# Patient Record
Sex: FEMALE | Race: WHITE | NOT HISPANIC OR LATINO | Employment: FULL TIME | URBAN - METROPOLITAN AREA
[De-identification: names, ages, dates, MRNs, and addresses within clinical notes are randomized per-mention and may not be internally consistent; named-entity substitution may affect disease eponyms.]

---

## 2017-03-20 ENCOUNTER — OFFICE VISIT (OUTPATIENT)
Dept: FAMILY MEDICINE | Facility: CLINIC | Age: 37
End: 2017-03-20

## 2017-03-20 VITALS
HEART RATE: 61 BPM | BODY MASS INDEX: 28.39 KG/M2 | DIASTOLIC BLOOD PRESSURE: 82 MMHG | WEIGHT: 165.4 LBS | RESPIRATION RATE: 16 BRPM | SYSTOLIC BLOOD PRESSURE: 120 MMHG | OXYGEN SATURATION: 96 %

## 2017-03-20 DIAGNOSIS — M25.50 MULTIPLE JOINT PAIN: Primary | ICD-10-CM

## 2017-03-20 DIAGNOSIS — E78.2 MIXED HYPERLIPIDEMIA: ICD-10-CM

## 2017-03-20 DIAGNOSIS — L70.9 ACNE, UNSPECIFIED ACNE TYPE: ICD-10-CM

## 2017-03-20 DIAGNOSIS — R53.83 TIRED: ICD-10-CM

## 2017-03-20 ASSESSMENT — PAIN SCALES - GENERAL: PAINLEVEL: NO PAIN (0)

## 2017-03-20 NOTE — MR AVS SNAPSHOT
After Visit Summary   3/20/2017    Patricia Corral    MRN: [id number]           Patient Information     Date Of Birth          1980        Visit Information        Provider Department      3/20/2017 3:50 PM Isac Fatima MD [facility]        Today's Diagnoses     Multiple joint pain    -  1    Tired        Acne, unspecified acne type        Mixed hyperlipidemia          Care Instructions    Primary Care Center Phone Number [phone]  Primary Care Center Medication Refill Request Information:  * Please contact your pharmacy regarding ANY request for medication refills.  ** [facility] Prescription Fax = [phone]  * Please allow 3 business days for routine medication refills.  * Please allow 5 business days for controlled substance medication refills.     Primary Care Center Test Result notification information:  *You will be notified with in 7-10 days of your appointment day regarding the results of your test.  If you are on MyChart you will be notified as soon as the provider has reviewed the results and signed off on them.                Follow-ups after your visit        Follow-up notes from your care team     Write patient with results      Future tests that were ordered for you today     Open Future Orders        Priority Expected Expires Ordered    Vitamin D Deficiency Routine 3/21/2017 3/20/2018 3/20/2017    CBC with platelets differential Routine 3/21/2017 3/20/2018 3/20/2017    Lipid panel reflex to direct LDL Routine 3/21/2017 3/20/2018 3/20/2017    TSH with free T4 reflex Routine 3/21/2017 3/20/2018 3/20/2017    CRP inflammation Routine 3/21/2017 3/20/2018 3/20/2017    Testosterone Free and Total Routine 3/21/2017 3/20/2018 3/20/2017    Follicle stimulating hormone Routine 3/21/2017 3/20/2018 3/20/2017    Lutropin Routine 3/21/2017 3/20/2018 3/20/2017    Estradiol Routine 3/21/2017 3/20/2018 3/20/2017    Comprehensive metabolic panel Routine 3/21/2017 3/20/2018  3/20/2017            Who to contact     Please call your clinic at [phone] to:    Ask questions about your health    Make or cancel appointments    Discuss your medicines    Learn about your test results    Speak to your doctor   If you have compliments or concerns about an experience at your clinic, or if you wish to file a complaint, please contact [facility] Patient Relations at [phone] or email us at [email or web address]         Additional Information About Your Visit        [id number] Information     Cardley is an electronic gateway that provides easy, online access to your medical records. With Cardley, you can request a clinic appointment, read your test results, renew a prescription or communicate with your care team.     To sign up for Cardley visit the website at [email or web address]   You will be asked to enter the access code listed below, as well as some personal information. Please follow the directions to create your username and password.     Your access code is: [email or web address]  Expires: 2017  7:30 AM     Your access code will  in 90 days. If you need help or a new code, please contact your [facility] or call [phone] for assistance.        Care EveryWhere ID     This is your Care EveryWhere ID. This could be used by other organizations to access your [place] medical records  [id number]        Your Vitals Were     Pulse Respirations Pulse Oximetry Breastfeeding? BMI (Body Mass Index)       61 16 96% No 28.39 kg/m2        Blood Pressure from Last 3 Encounters:   17 120/82   11/16/15 114/68   10/01/14 121/76    Weight from Last 3 Encounters:   17 75 kg (165 lb 6.4 oz)   11/16/15 72.8 kg (160 lb 8 oz)   10/01/14 72.1 kg (159 lb)               Primary Care Provider Office Phone # Fax #    Isac Fatima [phone]        [facility] [address] [place] [address] [phone]  [facility]  [id number]        Thank you!     Thank you for choosing [facility]  for your care. Our goal is always to provide you with excellent care. Hearing back from our patients is one way we can continue to improve our services. Please take a few minutes to complete the written survey that you may receive in the mail after your visit with us. Thank you!             Your Updated Medication List - Protect others around you: Learn how to safely use, store and throw away your medicines at www.disposemymeds.org.          This list is accurate as of: 3/20/17  5:02 PM.  Always use your most recent med list.                   Brand Name Dispense Instructions for use    clindamycin 1 % lotion    CLEOCIN T     Apply topically 2 times daily       fluticasone 50 MCG/ACT spray    FLONASE    1 Package    Spray 2 sprays into both nostrils daily.       ketotifen 0.025 % Soln ophthalmic solution    ZADITOR/REFRESH ANTI-ITCH    1 Bottle    Place 1 drop into both eyes 2 times daily       loratadine 10 MG tablet    CLARITIN    90 tablet    Take 1 tablet by mouth daily.       SPIRONOLACTONE PO      Take 25 mg by mouth       TRETIN-X 0.05 % CREAM Kit   Generic drug:  Tretinoin-Cleanser-Moisturizer          vitamin D 400 UNITS capsule      Take 1 capsule by mouth daily.

## 2017-03-20 NOTE — NURSING NOTE
Chief Complaint   Patient presents with     Knee Pain     pt is here to discuss joint pain x 6 months        Dedra Lama CMA at 4:04 PM on 3/20/2017

## 2017-03-20 NOTE — PROGRESS NOTES
SUBJECTIVE:  Patricia Corral is a 36-year-old female who presents for primary care assessment of joint pains and questions about weight.  I last saw Patricia in 2009 and she indicates she has been quite busy as her son Diogo born [birth year] 2011 was diagnosed at age 2-1/2 of having leukemia.  He is currently 6 years old recently finished treatment for leukemia.   She now feels she has time to  take care of her own personal health.  She saw dermatology for facial acne.  They recommended starting spironolactone.  She is worried about spironolactone, wondering if she should have some baseline labs prior to starting the spironolactone.  Additionally she is concerned she gained 15 pounds without trying and does not like oral contraceptives because they seem to make her gain weight and therefore she did not want to use oral contraceptives for acne treatment.    She indicates she is having several joint pains in her hands, shoulders and other joints of her lower extremities.  She tried repositioning herself at sleep, but still has joint pain.  She tries to exercise, but also works full-time and with her role as a mother and her son's medical concerns may not have been as active. She  is interested in baseline labs including lipids, also would like to have hormonal labs performed.  Her periods are regular at this time.  She was slightly irregular during the stressful period of time during her son's therapy, but periods have been regular for several months.  She denies any other significant problems at this time.  Denies depression.       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  She will be  in September.    REVIEW OF SYSTEMS:  Occasionally she will notice that her left knee hurts.  She felt as though her left knee went out once going up and down the stairs, but does not recall a significant injury. Joint pain and Fatigue.  Pain  in shoulders with trouble driving for about one year. She is wondering if this is related to sleep but still has pain. Left knee stopped when going down stairs off and on will feel like a popping.  Gained weight 15 pounds. Menses are regular currently. No depression.  She denies exposure to ticks no potential Lyme disease.    Patient Active Problem List   Diagnosis     Allergic rhinitis     Adjustment disorder with mixed anxiety and depressed mood     Subjective visual disturbance     Tear film insufficiency     MGD (meibomian gland disease)     Past Medical History   Diagnosis Date     Asthma      Migraine headache      Seasonal allergies      Past Surgical History   Procedure Laterality Date     Dental surgery       [place] teeth      Family History   Problem Relation Age of Onset     HEART DISEASE Maternal Grandfather      MI     Alcohol/Drug Paternal Grandfather      alcoholism     Hypertension Mother      Depression Sister      Dementia Paternal Grandmother      Glaucoma No family hx of      Macular Degeneration No family hx of      Social History     Social History     Marital status: Single     Spouse name: N/A     Number of children: 1     Years of education: N/A     Occupational History     pharmacy [employer]     Social History Main Topics     Smoking status: Former Smoker     Years: 5.00     Quit date: 1/1/2006     Smokeless tobacco: Never Used     Alcohol use Yes      Comment: occ     Drug use: No     Sexual activity: Yes     Partners: Male      Comment: on Nuva ring     Other Topics Concern     Not on file     Social History Narrative    Caffeine intake/servings daily - 1    Calcium intake/servings daily - 3    Exercise 2 times weekly - describe weights    Sunscreen used - Yes    Seatbelts used - Yes    Guns stored in the home - No    Self Breast Exam - Yes    Pap test up to date -  Yes - follows at an [facility]     Eye exam up to date  -  Yes    Dental exam up to date -  Yes    DEXA scan up to date -  Not Applicable    Flex Sig/Colonoscopy up to date -  Not Applicable    Mammography up to date -  Not Applicable    Immunizations reviewed and up to date - Yes    Abuse: Current or Past (Physical, Sexual or Emotional) - No    Do you feel safe in your environment - Yes    Do you cope well with stress - Yes    Do you suffer from insomnia - No    Last updated by: Coleen Azul  4/6/2005       Problem list, PMH, Surgical HX, FH, SH, allergies, medications,immunizations reviewed and updated in Epic. 5 point ROS negative other than noted in HPI and ROS.    OBJECTIVE: /82  Pulse 61  Resp 16  Wt 75 kg (165 lb 6.4 oz)  SpO2 96%  Breastfeeding? No  BMI 28.39 kg/m2  GENERAL:  Examination reveals a pleasant female who is in no acute distress.   HEENT: Moist mucous membranes. Mild facial acne in the area of the chin region.  She does not have hirsutism.    NECK: Thyroid non enlarges no mass, no cervical adenopathy    LUNGS:  Clear.   HEART:  S1, S2, with regular rate and rhythm.   ABDOMEN:  There is no apparent hepatosplenomegaly, no apparent masses, nontender.   PSYCHIATRIC:  Mood is stable, interactive.   MUSCULOSKELETAL:  Joints are without swelling or redness.  No joint abnormality noted .She has good range of motion of all joints.  She does not have hypermobility to her joints.    Bilateral knee exam mild crepitance to patella no pain. Ligaments intact. Martell negative.    ASSESSMENT AND PLAN:  Patricia Corral is here to follow up with primary care for concerns related to joint pains.  She indicates that she does eat a good amount of calcium and vitamin D through her nutrition.    She had recent weight gain of about 15 pounds, is interested in an assessment through hormones and labs including lipids and thyroid function.  I have recommended 30 minutes of activity most days of the week and with gentle stretching and range of motion.  Further plan pending  results.   Facial Acne: We reviewed spironolactone, the side effects ( SE) of this medication and the mechanism of action.   I also discussed SE of gynecomastia that she would need to be on an effective method of birth control.  Currently, she is using condoms.  She will weigh pros and cons to determine if she is starting spironolactone per Dermatology suggestion.        Patricia was seen today for knee pain. It is not severe just notes cracking.    Diagnoses and all orders for this visit:    Multiple joint pain  -     CRP inflammation; Future  -     Vitamin D Deficiency; Future    Tired  -     CBC with platelets differential; Future  -     TSH with free T4 reflex; Future  -     Comprehensive metabolic panel; Future    Acne, unspecified acne type  -     Testosterone Free and Total; Future  -     Follicle stimulating hormone; Future  -     Lutropin; Future  -     Estradiol; Future    Mixed hyperlipidemia  -     Lipid panel reflex to direct LDL; Future      All questions were addressed.  She voiced understanding and agreement with the above.       Isac Fatima MD

## 2017-03-24 DIAGNOSIS — E78.2 MIXED HYPERLIPIDEMIA: ICD-10-CM

## 2017-03-24 DIAGNOSIS — M25.50 MULTIPLE JOINT PAIN: ICD-10-CM

## 2017-03-24 DIAGNOSIS — R53.83 TIRED: ICD-10-CM

## 2017-03-24 DIAGNOSIS — L70.9 ACNE, UNSPECIFIED ACNE TYPE: ICD-10-CM

## 2017-03-24 LAB
ALBUMIN SERPL-MCNC: 4.1 G/DL (ref 3.4–5)
ALP SERPL-CCNC: 66 U/L (ref 40–150)
ALT SERPL W P-5'-P-CCNC: 37 U/L (ref 0–50)
ANION GAP SERPL CALCULATED.3IONS-SCNC: 8 MMOL/L (ref 3–14)
AST SERPL W P-5'-P-CCNC: 27 U/L (ref 0–45)
BASOPHILS # BLD AUTO: 0.1 10E9/L (ref 0–0.2)
BASOPHILS NFR BLD AUTO: 1.3 %
BILIRUB SERPL-MCNC: 0.6 MG/DL (ref 0.2–1.3)
BUN SERPL-MCNC: 8 MG/DL (ref 7–30)
CALCIUM SERPL-MCNC: 8.6 MG/DL (ref 8.5–10.1)
CHLORIDE SERPL-SCNC: 102 MMOL/L (ref 94–109)
CHOLEST SERPL-MCNC: 142 MG/DL
CO2 SERPL-SCNC: 29 MMOL/L (ref 20–32)
CREAT SERPL-MCNC: 0.64 MG/DL (ref 0.52–1.04)
CRP SERPL-MCNC: <2.9 MG/L (ref 0–8)
DEPRECATED CALCIDIOL+CALCIFEROL SERPL-MC: 13 UG/L (ref 20–75)
DIFFERENTIAL METHOD BLD: NORMAL
EOSINOPHIL # BLD AUTO: 0.4 10E9/L (ref 0–0.7)
EOSINOPHIL NFR BLD AUTO: 6.6 %
ERYTHROCYTE [DISTWIDTH] IN BLOOD BY AUTOMATED COUNT: 11.6 % (ref 10–15)
ESTRADIOL SERPL-MCNC: 87 PG/ML
FSH SERPL-ACNC: 9.2 IU/L
GFR SERPL CREATININE-BSD FRML MDRD: NORMAL ML/MIN/1.7M2
GLUCOSE SERPL-MCNC: 90 MG/DL (ref 70–99)
HCT VFR BLD AUTO: 43.2 % (ref 35–47)
HDLC SERPL-MCNC: 48 MG/DL
HGB BLD-MCNC: 14.7 G/DL (ref 11.7–15.7)
IMM GRANULOCYTES # BLD: 0 10E9/L (ref 0–0.4)
IMM GRANULOCYTES NFR BLD: 0.3 %
LDLC SERPL CALC-MCNC: 80 MG/DL
LH SERPL-ACNC: 17.9 IU/L
LYMPHOCYTES # BLD AUTO: 2 10E9/L (ref 0.8–5.3)
LYMPHOCYTES NFR BLD AUTO: 30.8 %
MCH RBC QN AUTO: 30.4 PG (ref 26.5–33)
MCHC RBC AUTO-ENTMCNC: 34 G/DL (ref 31.5–36.5)
MCV RBC AUTO: 89 FL (ref 78–100)
MONOCYTES # BLD AUTO: 0.6 10E9/L (ref 0–1.3)
MONOCYTES NFR BLD AUTO: 8.6 %
NEUTROPHILS # BLD AUTO: 3.4 10E9/L (ref 1.6–8.3)
NEUTROPHILS NFR BLD AUTO: 52.4 %
NONHDLC SERPL-MCNC: 94 MG/DL
NRBC # BLD AUTO: 0 10*3/UL
NRBC BLD AUTO-RTO: 0 /100
PLATELET # BLD AUTO: 374 10E9/L (ref 150–450)
POTASSIUM SERPL-SCNC: 3.8 MMOL/L (ref 3.4–5.3)
PROT SERPL-MCNC: 7.9 G/DL (ref 6.8–8.8)
RBC # BLD AUTO: 4.83 10E12/L (ref 3.8–5.2)
SODIUM SERPL-SCNC: 139 MMOL/L (ref 133–144)
TRIGL SERPL-MCNC: 69 MG/DL
TSH SERPL DL<=0.005 MIU/L-ACNC: 0.74 MU/L (ref 0.4–4)
WBC # BLD AUTO: 6.4 10E9/L (ref 4–11)

## 2017-03-25 LAB
SHBG SERPL-SCNC: 27 NMOL/L (ref 30–135)
TESTOST FREE SERPL-MCNC: 0.28 NG/DL (ref 0.13–0.92)
TESTOST SERPL-MCNC: 14 NG/DL (ref 8–60)

## 2017-06-03 ENCOUNTER — HEALTH MAINTENANCE LETTER (OUTPATIENT)
Age: 37
End: 2017-06-03

## 2018-01-17 ENCOUNTER — OFFICE VISIT (OUTPATIENT)
Dept: OBGYN | Facility: CLINIC | Age: 38
End: 2018-01-17
Attending: ADVANCED PRACTICE MIDWIFE
Payer: COMMERCIAL

## 2018-01-17 VITALS
SYSTOLIC BLOOD PRESSURE: 120 MMHG | BODY MASS INDEX: 29.2 KG/M2 | HEART RATE: 75 BPM | WEIGHT: 170.1 LBS | DIASTOLIC BLOOD PRESSURE: 73 MMHG

## 2018-01-17 DIAGNOSIS — O09.529 HIGH-RISK PREGNANCY, ELDERLY MULTIGRAVIDA, UNSPECIFIED TRIMESTER: Primary | ICD-10-CM

## 2018-01-17 DIAGNOSIS — F43.23 ADJUSTMENT DISORDER WITH MIXED ANXIETY AND DEPRESSED MOOD: ICD-10-CM

## 2018-01-17 DIAGNOSIS — Z36.89 ENCOUNTER FOR FETAL ANATOMIC SURVEY: ICD-10-CM

## 2018-01-17 DIAGNOSIS — Z34.81 ENCOUNTER FOR SUPERVISION OF OTHER NORMAL PREGNANCY IN FIRST TRIMESTER: Primary | ICD-10-CM

## 2018-01-17 LAB
ABO + RH BLD: NORMAL
ABO + RH BLD: NORMAL
BASOPHILS # BLD AUTO: 0 10E9/L (ref 0–0.2)
BASOPHILS NFR BLD AUTO: 0.3 %
BLD GP AB SCN SERPL QL: NORMAL
BLOOD BANK CMNT PATIENT-IMP: NORMAL
DIFFERENTIAL METHOD BLD: NORMAL
EOSINOPHIL # BLD AUTO: 0.6 10E9/L (ref 0–0.7)
EOSINOPHIL NFR BLD AUTO: 5.8 %
ERYTHROCYTE [DISTWIDTH] IN BLOOD BY AUTOMATED COUNT: 12.1 % (ref 10–15)
HCT VFR BLD AUTO: 41.2 % (ref 35–47)
HGB BLD-MCNC: 13.9 G/DL (ref 11.7–15.7)
IMM GRANULOCYTES # BLD: 0 10E9/L (ref 0–0.4)
IMM GRANULOCYTES NFR BLD: 0.3 %
LYMPHOCYTES # BLD AUTO: 1.7 10E9/L (ref 0.8–5.3)
LYMPHOCYTES NFR BLD AUTO: 16.6 %
MCH RBC QN AUTO: 30.8 PG (ref 26.5–33)
MCHC RBC AUTO-ENTMCNC: 33.7 G/DL (ref 31.5–36.5)
MCV RBC AUTO: 91 FL (ref 78–100)
MONOCYTES # BLD AUTO: 0.6 10E9/L (ref 0–1.3)
MONOCYTES NFR BLD AUTO: 6.3 %
NEUTROPHILS # BLD AUTO: 7.1 10E9/L (ref 1.6–8.3)
NEUTROPHILS NFR BLD AUTO: 70.7 %
NRBC # BLD AUTO: 0 10*3/UL
NRBC BLD AUTO-RTO: 0 /100
PLATELET # BLD AUTO: 330 10E9/L (ref 150–450)
RBC # BLD AUTO: 4.52 10E12/L (ref 3.8–5.2)
SPECIMEN EXP DATE BLD: NORMAL
WBC # BLD AUTO: 10.1 10E9/L (ref 4–11)

## 2018-01-17 PROCEDURE — G0463 HOSPITAL OUTPT CLINIC VISIT: HCPCS | Mod: 25,ZF

## 2018-01-17 PROCEDURE — 76801 OB US < 14 WKS SINGLE FETUS: CPT | Mod: ZF

## 2018-01-17 PROCEDURE — 86900 BLOOD TYPING SEROLOGIC ABO: CPT | Performed by: ADVANCED PRACTICE MIDWIFE

## 2018-01-17 PROCEDURE — 86901 BLOOD TYPING SEROLOGIC RH(D): CPT | Performed by: ADVANCED PRACTICE MIDWIFE

## 2018-01-17 PROCEDURE — 86850 RBC ANTIBODY SCREEN: CPT | Performed by: ADVANCED PRACTICE MIDWIFE

## 2018-01-17 PROCEDURE — 36415 COLL VENOUS BLD VENIPUNCTURE: CPT | Performed by: ADVANCED PRACTICE MIDWIFE

## 2018-01-17 PROCEDURE — 87389 HIV-1 AG W/HIV-1&-2 AB AG IA: CPT | Performed by: ADVANCED PRACTICE MIDWIFE

## 2018-01-17 PROCEDURE — 87340 HEPATITIS B SURFACE AG IA: CPT | Performed by: ADVANCED PRACTICE MIDWIFE

## 2018-01-17 PROCEDURE — 82306 VITAMIN D 25 HYDROXY: CPT | Performed by: ADVANCED PRACTICE MIDWIFE

## 2018-01-17 PROCEDURE — 86762 RUBELLA ANTIBODY: CPT | Performed by: ADVANCED PRACTICE MIDWIFE

## 2018-01-17 PROCEDURE — 86706 HEP B SURFACE ANTIBODY: CPT | Performed by: ADVANCED PRACTICE MIDWIFE

## 2018-01-17 PROCEDURE — 87086 URINE CULTURE/COLONY COUNT: CPT | Performed by: ADVANCED PRACTICE MIDWIFE

## 2018-01-17 PROCEDURE — 85025 COMPLETE CBC W/AUTO DIFF WBC: CPT | Performed by: ADVANCED PRACTICE MIDWIFE

## 2018-01-17 PROCEDURE — 86780 TREPONEMA PALLIDUM: CPT | Performed by: ADVANCED PRACTICE MIDWIFE

## 2018-01-17 RX ORDER — PRENATAL VIT/IRON FUM/FOLIC AC 27MG-0.8MG
1 TABLET ORAL DAILY
COMMUNITY
End: 2019-04-09

## 2018-01-17 NOTE — PROGRESS NOTES
"  Subjective   37 year old female who presents to clinic for initiation of OB care.   at 9w1d with estimated date of delivery of Aug 21, 2018 based on LMP, ultrasound confirms.  Pregnancy is planned.    Symptoms since LMP include mild nausea and fatigue.  Patient has tried these relief measures: diet modification, small frequent meals, increased rest and increased fluids.      PERSONAL/SOCIAL HISTORY  Lives with  and son.  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   to , \"[place].\"  in 2017.   He is the FOB to this child; not birth father of 1st child.    Additional items: Denies past or present domestic violence, sexual and psychological abuse.    OTHER:    - Son, \"Diogo,\" born in . Diagnosed with acute lymphoblastic leukemia at 2.5 years old. Now 8 years old and had last cancer treatment 1 year   ago.   - Hx of adjustment disorder- anxiety and depression- during this time. Reports she saw a counselor to cope with stress of her sick child. Denies  concerns now; is not on medication, not continuing with counseling.    Objective  -VS: reviewed and within normal limits   -General appearance: no acute distress, patient is comfortable   NEUROLOGICAL/PSYCHIATRIC   - Oriented x3,   -Mood and affect: : normal   Genetic/Infection questionnaire completed, risks include: none    Pt reports last pap was last year at [facility] in [place].    Assessment/Plan   at 9w1d  by Patient's last menstrual period was 2017. and US confirms  Encounter Diagnosis   Name Primary?     High-risk pregnancy, elderly multigravida, first trimester Yes     Orders Placed This Encounter   Procedures     25- OH-Vitamin D     CBC with Platelets Differential [GQF126]     Anti Treponema [PIW8661]     Rubella Antibody IgG Quantitative [YJU4143]     Hepatitis B Surface Antigen [QIH291]     Hepatitis B Surface Antibody     HIV " Antigen Antibody Combo [MZM3579]     [facility] Referral [9046.022]     ABO/Rh Type and Screen [CKT309]     Orders Placed This Encounter   Medications     Prenatal Vit-Fe Fumarate-FA (PRENATAL MULTIVITAMIN PLUS IRON) 27-0.8 MG TABS per tablet     Sig: Take 1 tablet by mouth daily     - Oriented to Practice, types of care, and how to reach a provider. Undecided CNM or MD.   - Patient received 1st trimester new OB education packet complete with aide of The Expectant Family booklet including information on genetic screening test options.  - Patient desires level II ultrasound which was ordered.  - Patient was encouraged to start prenatal vitamins as tolerated.    - Patient instructed to schedule new OB exam with provider at 10 weeks.    - Pregnancy concerns to be addressed by provider at new OB exam include: Needs GC/CT at [facility], record release for pap done last year, cnsanna or md care.    OBI education reviewed.  OBI labs ordered.  Needs record release completed at [facility] for pap. Up to date- done last year at [facility].  Needs GC/CT at [facility].  Desires level 2 ultrasound, M referral placed.   Undecided on 1st trimester screening, discuss again at B.  Undecided KARO or MD care.    Pt to RTO for NOB visit in 2 weeks and prn if questions or concerns    ESTEE Heller CNM

## 2018-01-17 NOTE — MR AVS SNAPSHOT
"              After Visit Summary   1/17/2018    Patricia Levi    MRN: [id number]           Patient Information     Date Of Birth          1980        Visit Information        Provider Department      1/17/2018 2:30 PM Boogie Payton CNM [facility]        Today's Diagnoses     High-risk pregnancy, elderly multigravida, first trimester    -  1    Encounter for fetal anatomic survey        Adjustment disorder with mixed anxiety and depressed mood          Care Instructions      Thank you for choosing [facility] for your [facility]etrical care.  We are an integrated health clinic with obstetricians, midwives, a psychologist, an acupuncturist, a nutritionist, a pharmacist, internal medicine and family practice all in one.  If you have questions about services offered please ask.      o Please keep all appointments with your provider.  You will help catch, prevent and treat problems early.  o Review your Expectant Family booklet and folder given to you at this intake visit.  It can answer many basic questions including:    Treatment for nausea and vomiting    Medications that are safe in pregnancy    Healthy diet and weight gain    Exercise and activity  o ASK questions!!  Please use \"Questions for my New OB visit\" form to write down any questions or concerns for your next visit.  o Eat a healthy diet.  Visit www.choosemyplate.gov and click on  Pregnancy and Breastfeeding  for information and tips  o Do not smoke.  Avoid other people's smoke, too.  We are happy to help with referrals to stop smoking programs.  o Do not drink alcohol.  o Try to avoid people who have colds or other infections.  Practice good hand washing.  o Consider registering for our Healthy Pregnancy Class here at [facility].  This class is offered every 3rd Wednesday from 2:30-4:30 p.m.  [place] at [phone] or online at [email or web address] or " [email or web address]  o Consider registering for prenatal education classes through [place] at [facility].  You can view class schedules and register online at [email or web address] or call [phone] for questions     For urgent concerns, call [facility] at [phone] to speak with a triage nurse during regular clinic hours (8:00 am - 4:30 pm).  This line is answered by our service 24 hours a day, after hours a provider will return your call.  Thank you for choosing [facility] for your obstetrical care.  We are an integrated health clinic with obstetricians, midwives, a psychologist, an acupuncturist, a nutritionist, a pharmacist, internal medicine and family practice all in one.  If you have questions about services offered please ask.      o Please keep all appointments with your provider.  You will help catch, prevent and treat problems early.  o Review your Expectant Family booklet and folder given to you at this intake visit.  It can answer many basic questions including:    Treatment for nausea and vomiting    Medications that are safe in pregnancy    Healthy diet and weight gain    Exercise and activity  o ASK questions!!  Please use \"Questions for my New OB visit\" form to write down any questions or concerns for your next visit.  o Eat a healthy diet.  Visit www.choosemyplate.gov and click on  Pregnancy and Breastfeeding  for information and tips  o Do not smoke.  Avoid other people's smoke, too.  We are happy to help with referrals to stop smoking programs.  o Do not drink alcohol.  o Try to avoid people who have colds or other infections.  Practice good hand washing.  o Consider registering for our Healthy Pregnancy Class here at [facility].  This class is offered every 3rd Wednesday from 2:30-4:30 p.m.  [place] at [phone] or online at [email or web address] or [email or web address]  o Consider registering for prenatal " education classes through [place] at [facility].  You can view class schedules and register online at [email or web address] or call [phone] for questions     For urgent concerns, call [facility] at [phone] to speak with a triage nurse during regular clinic hours (8:00 am - 4:30 pm).  This line is answered by our service 24 hours a day, after hours a provider will return your call.          Follow-ups after your visit        Additional Services     Maternal Fetal Medicine Center Referral [9046.022]       >> Patient may proceed with recommendations for further testing as directed by the Maternal Fetal Medicine Specialist >>    >> If requesting Fetal Echo: M will determine appropriate location for exam due to indication.    >> If requesting Lung Maturity Amnio:  If results indicate fetal lung maturity, induction or C/S is recommended within 36 hours.  Please schedule accordingly.     Dear Patient:   Please be aware that coverage of these services is subject to the terms and limitations of your health insurance plan.  Call member services at your health plan with any benefit or coverage questions.      Please bring the following to your appointment:    >>  Any x-rays, CTs or MRIs which have been performed.  Contact the facility where they were done to arrange for  prior to your scheduled appointment.  Any new CT, MRI or other procedures ordered by your specialist must be performed at a [place] facility or coordinated by your clinic's referral office.  >>  List of current medications   >>  This referral request   >>  Any documents/labs given to you for this referral                  Follow-up notes from your care team     Return in 2 weeks (on 1/31/2018) for NOB.      Future tests that were ordered for you today     Open Future Orders        Priority Expected Expires Ordered    [facility] Genetic Counseling Routine  1/17/2019 1/17/2018    [facility] US Comprehensive Single Routine  11/17/2018  1/17/2018            Who to contact     Please call your clinic at [phone] to:    Ask questions about your health    Make or cancel appointments    Discuss your medicines    Learn about your test results    Speak to your doctor   If you have compliments or concerns about an experience at your clinic, or if you wish to file a complaint, please contact [facility] Patient Relations at [phone] or email us at [email or web address] [email or web address]         Additional Information About Your Visit        Jpwholesalehart Information     [employer]im[employer] gives you secure access to your electronic health record. If you see a primary care provider, you can also send messages to your care team and make appointments. If you have questions, please call your primary care clinic.  If you do not have a primary care provider, please call [phone] and they will assist you.      [employer]im[employer] devices is an electronic gateway that provides easy, online access to your medical records. With [employer], you can request a clinic appointment, read your test results, renew a prescription or communicate with your care team.     To access your existing account, please contact your [facility] or call [phone] for assistance.        Care EveryWhere ID     This is your Care EveryWhere ID. This could be used by other organizations to access your [place] medical records  [id number]        Your Vitals Were     Pulse Last Period BMI (Body Mass Index)             75 11/14/2017 29.2 kg/m2          Blood Pressure from Last 3 Encounters:   01/17/18 120/73   03/20/17 120/82   11/16/15 114/68    Weight from Last 3 Encounters:   01/17/18 77.2 kg (170 lb 1.6 oz)   03/20/17 75 kg (165 lb 6.4 oz)   11/16/15 72.8 kg (160 lb 8 oz)              We Performed the Following     25- [place]-Vitamin D     ABO/Rh Type and Screen [UJO367]     Anti Treponema [YEW9824]     CBC with Platelets Differential [OEX470]     Hepatitis B Surface  Antibody     Hepatitis B Surface Antigen [SCF231]     HIV Antigen Antibody Combo [YRI2345]     Maternal Fetal Medicine Center Referral [9046.022]     Rubella Antibody IgG Quantitative [XZT4051]     Urine Culture Aerobic Bacterial [HEU071]        Primary Care Provider Office Phone # Fax #    Isac Fatima [phone]       [address] [facility] [address]        Equal Access to Services     HECTOR BOWENS : Hadii aad ku hadasho Soomaali, waaxda luqadaha, qaybta kaalmada adeegyada, waxay idiin hayaan adeeg khisabellash la'kayn ah. So [facility] [phone].    ATENCIÓN: Si juarez heart, tiene a lin disposición servicios gratuitos de asistencia lingüística. Lucyame al [phone].    We comply with applicable federal civil rights laws and [place] laws. We do not discriminate on the basis of race, color, national origin, age, disability, sex, sexual orientation, or gender identity.            Thank you!     Thank you for choosing [facility]  for your care. Our goal is always to provide you with excellent care. Hearing back from our patients is one way we can continue to improve our services. Please take a few minutes to complete the written survey that you may receive in the mail after your visit with us. Thank you!             Your Updated Medication List - Protect others around you: Learn how to safely use, store and throw away your medicines at www.disposemymeds.org.          This list is accurate as of: 1/17/18  3:57 PM.  Always use your most recent med list.                   Brand Name Dispense Instructions for use Diagnosis    clindamycin 1 % lotion    CLEOCIN T     Apply topically 2 times daily        fluticasone 50 MCG/ACT spray    FLONASE    1 Package    Spray 2 sprays into both nostrils daily.    Allergic rhinitis, cause unspecified       ketotifen 0.025 % Soln ophthalmic solution    ZADITOR/REFRESH ANTI-ITCH    1 Bottle    Place 1 drop into both eyes 2 times daily     Chronic allergic conjunctivitis       loratadine 10 MG tablet    CLARITIN    90 tablet    Take 1 tablet by mouth daily.    Allergic rhinitis, cause unspecified       prenatal multivitamin plus iron 27-0.8 MG Tabs per tablet      Take 1 tablet by mouth daily        SPIRONOLACTONE PO      Take 25 mg by mouth        TRETIN-X 0.05 % CREAM Kit   Generic drug:  Tretinoin-Cleanser-Moisturizer           vitamin D 400 UNITS capsule      Take 1 capsule by mouth daily.

## 2018-01-17 NOTE — PROGRESS NOTES
37 year old female, , with LMP 2017, 9 1/7 weeks. Estimated Date of Delivery: 2018,  presents for confirmation of dates and assessment of viability. This study was done transabdominally.    Measurements     CRL = 27.0 mm = 9 4/7 weeks  EGA.       Fetal anatomy appears normal for gestational age.     Fetal/Fetal Cardiac Activity: Present.  FHR = 158bpm.     Implantation: Intrauterine.     Cervix = 3.4 cm      Maternal structures appear normal.    Impression: Viable howe intrauterine pregnancy at 9+4.    Recommend comprehensive scan at 18 to 20 weeks.    JAMAL Baires MD

## 2018-01-17 NOTE — MR AVS SNAPSHOT
After Visit Summary   1/17/2018    Patricia Levi    MRN: [id number]           Patient Information     Date Of Birth          1980        Visit Information        Provider Department      1/17/2018 2:00 PM [facility] ULTRASOUND [facility]        Today's Diagnoses     Encounter for supervision of other normal pregnancy in first trimester    -  1       Follow-ups after your visit        Who to contact     Please call your clinic at [phone] to:    Ask questions about your health    Make or cancel appointments    Discuss your medicines    Learn about your test results    Speak to your doctor   If you have compliments or concerns about an experience at your clinic, or if you wish to file a complaint, please contact [facility] Patient Relations at [phone] or email us at [email or web address]         Additional Information About Your Visit        MyChart Information     [employer] gives you secure access to your electronic health record. If you see a primary care provider, you can also send messages to your care team and make appointments. If you have questions, please call your primary care clinic.  If you do not have a primary care provider, please call [phone] and they will assist you.      [employer] is an electronic gateway that provides easy, online access to your medical records. With [employer], you can request a clinic appointment, read your test results, renew a prescription or communicate with your care team.     To access your existing account, please contact your [facility] or call [phone] for assistance.        Care EveryWhere ID     This is your Care EveryWhere ID. This could be used by other organizations to access your [place] medical records  [id number]        Your Vitals Were     Last Period                   11/14/2017            Blood Pressure from Last 3 Encounters:   01/17/18 120/73    03/20/17 120/82   11/16/15 114/68    Weight from Last 3 Encounters:   01/17/18 77.2 kg (170 lb 1.6 oz)   03/20/17 75 kg (165 lb 6.4 oz)   11/16/15 72.8 kg (160 lb 8 oz)               Primary Care Provider Office Phone # Fax #    Isac Fatmia [phone]        [facility] [address]        Equal Access to Services     HECTOR BOWENS : Hadii aad ku hadasho Soomaali, waaxda luqadaha, qaybta kaalmada adeegyada, waxay idiin hayaan adeyuliana macdonald . So [facility] [phone].    ATENCIÓN: Si habla español, tiene a lin disposición servicios gratuitos de asistencia lingüística. [facility] [phone].    We comply with applicable federal civil rights laws and [place] laws. We do not discriminate on the basis of race, color, national origin, age, disability, sex, sexual orientation, or gender identity.            Thank you!     Thank you for choosing [facility]  for your care. Our goal is always to provide you with excellent care. Hearing back from our patients is one way we can continue to improve our services. Please take a few minutes to complete the written survey that you may receive in the mail after your visit with us. Thank you!             Your Updated Medication List - Protect others around you: Learn how to safely use, store and throw away your medicines at www.disposemymeds.org.          This list is accurate as of: 1/17/18  2:32 PM.  Always use your most recent med list.                   Brand Name Dispense Instructions for use Diagnosis    clindamycin 1 % lotion    CLEOCIN T     Apply topically 2 times daily        fluticasone 50 MCG/ACT spray    FLONASE    1 Package    Spray 2 sprays into both nostrils daily.    Allergic rhinitis, cause unspecified       ketotifen 0.025 % Soln ophthalmic solution    ZADITOR/REFRESH ANTI-ITCH    1 Bottle    Place 1 drop into both eyes 2 times daily    Chronic allergic conjunctivitis       loratadine 10 MG tablet     CLARITIN    90 tablet    Take 1 tablet by mouth daily.    Allergic rhinitis, cause unspecified       prenatal multivitamin plus iron 27-0.8 MG Tabs per tablet      Take 1 tablet by mouth daily        SPIRONOLACTONE PO      Take 25 mg by mouth        TRETIN-X 0.05 % CREAM Kit   Generic drug:  Tretinoin-Cleanser-Moisturizer           vitamin D 400 UNITS capsule      Take 1 capsule by mouth daily.

## 2018-01-17 NOTE — LETTER
2018       RE: Patricia Levi  [address] [place] [address]     Dear Colleague,    Thank you for referring your patient, Patricia Levi, to the [facility] at [facility]. Please see a copy of my visit note below.    37 year old female, , with LMP 2017, 9 1/7 weeks. Estimated Date of Delivery: 2018,  presents for confirmation of dates and assessment of viability. This study was done transabdominally.    Measurements     CRL = 27.0 mm = 9 4/7 weeks  EGA.       Fetal anatomy appears normal for gestational age.     Fetal/Fetal Cardiac Activity: Present.  FHR = 158bpm.     Implantation: Intrauterine.     Cervix = 3.4 cm      Maternal structures appear normal.    Impression: Viable howe intrauterine pregnancy at 9+4.    Recommend comprehensive scan at 18 to 20 weeks.    JAMAL Baires MD

## 2018-01-18 PROBLEM — E55.9 VITAMIN D DEFICIENCY: Status: ACTIVE | Noted: 2018-01-18

## 2018-01-18 LAB
BACTERIA SPEC CULT: NORMAL
BACTERIA SPEC CULT: NORMAL
DEPRECATED CALCIDIOL+CALCIFEROL SERPL-MC: 21 UG/L (ref 20–75)
HBV SURFACE AB SERPL IA-ACNC: 175.28 M[IU]/ML
HBV SURFACE AG SERPL QL IA: NONREACTIVE
HIV 1+2 AB+HIV1 P24 AG SERPL QL IA: NONREACTIVE
Lab: NORMAL
RUBV IGG SERPL IA-ACNC: 47 IU/ML
SPECIMEN SOURCE: NORMAL
T PALLIDUM IGG+IGM SER QL: NEGATIVE

## 2018-01-31 ENCOUNTER — OFFICE VISIT (OUTPATIENT)
Dept: OBGYN | Facility: CLINIC | Age: 38
End: 2018-01-31
Attending: ADVANCED PRACTICE MIDWIFE
Payer: COMMERCIAL

## 2018-01-31 VITALS
HEART RATE: 73 BPM | HEIGHT: 64 IN | DIASTOLIC BLOOD PRESSURE: 73 MMHG | SYSTOLIC BLOOD PRESSURE: 114 MMHG | WEIGHT: 171.3 LBS | BODY MASS INDEX: 29.24 KG/M2

## 2018-01-31 DIAGNOSIS — O09.529 HIGH-RISK PREGNANCY, ELDERLY MULTIGRAVIDA, UNSPECIFIED TRIMESTER: Primary | ICD-10-CM

## 2018-01-31 PROCEDURE — 87591 N.GONORRHOEAE DNA AMP PROB: CPT | Performed by: ADVANCED PRACTICE MIDWIFE

## 2018-01-31 PROCEDURE — 87491 CHLMYD TRACH DNA AMP PROBE: CPT | Performed by: ADVANCED PRACTICE MIDWIFE

## 2018-01-31 PROCEDURE — G0463 HOSPITAL OUTPT CLINIC VISIT: HCPCS | Mod: ZF

## 2018-01-31 ASSESSMENT — ANXIETY QUESTIONNAIRES
1. FEELING NERVOUS, ANXIOUS, OR ON EDGE: NOT AT ALL
5. BEING SO RESTLESS THAT IT IS HARD TO SIT STILL: NOT AT ALL
2. NOT BEING ABLE TO STOP OR CONTROL WORRYING: NOT AT ALL
7. FEELING AFRAID AS IF SOMETHING AWFUL MIGHT HAPPEN: NOT AT ALL
GAD7 TOTAL SCORE: 0
6. BECOMING EASILY ANNOYED OR IRRITABLE: NOT AT ALL
3. WORRYING TOO MUCH ABOUT DIFFERENT THINGS: NOT AT ALL

## 2018-01-31 ASSESSMENT — PATIENT HEALTH QUESTIONNAIRE - PHQ9: 5. POOR APPETITE OR OVEREATING: NOT AT ALL

## 2018-01-31 NOTE — MR AVS SNAPSHOT
After Visit Summary   1/31/2018    Patricia Levi    MRN: [id number]           Patient Information     Date Of Birth          1980        Visit Information        Provider Department      1/31/2018 10:30 AM Berto Richardson APRN CNM [facility]        Today's Diagnoses     High-risk pregnancy, elderly multigravida, first trimester    -  1       Follow-ups after your visit        Follow-up notes from your care team     Return in about 3 weeks (around 2/21/2018) for ELPIDIO Visit.      Your next 10 appointments already scheduled     Feb 21, 2018 10:15 AM CST   RETURN OB with ESTEE Hurtado CNM   [facility] ([facility])    [place] [address]  [address]  [facility] [address] [phone]            Mar 27, 2018  8:45 AM CDT   Genetic Counseling with JOSE RAFAEL GEN COUNSELOR 2   [facility]    [address]  [facility] [address]   [phone]            Mar 27, 2018  9:30 AM CDT   (Arrive by 9:15 AM)   YUE US COMP with JOSE [id number]   [facility] [place] [facility])    [address]  [facility] [address] [phone]           Wear comfortable clothes and leave your valuables at home.            Mar 27, 2018 10:00 AM CDT   Radiology MD with JOSE RAFAEL TURNER MD   [facility]    [address]  [facility] [address]   [phone]           Please arrive at the time given for your first appointment. This visit is used internally to schedule the physician's time during your ultrasound.              Who to contact     Please call your clinic at [phone] to:    Ask questions about your health    Make or cancel appointments    Discuss your medicines    Learn about your test  "results    Speak to your doctor   If you have compliments or concerns about an experience at your clinic, or if you wish to file a complaint, please contact [facility] Patient Relations at [phone] or email us at [email or web address]         Additional Information About Your Visit        [employer] Information     [facility] gives you secure access to your electronic health record. If you see a primary care provider, you can also send messages to your care team and make appointments. If you have questions, please call your primary care clinic.  If you do not have a primary care provider, please call [phone] and they will assist you.      [employer] is an electronic gateway that provides easy, online access to your medical records. With [employer], you can request a clinic appointment, read your test results, renew a prescription or communicate with your care team.     To access your existing account, please contact your [facility] or call [phone] for assistance.        Care EveryWhere ID     This is your Care EveryWhere ID. This could be used by other organizations to access your [place] medical records  [id number]        Your Vitals Were     Pulse Height Last Period BMI (Body Mass Index)          73 1.626 m (5' 4\") 11/14/2017 29.4 kg/m2         Blood Pressure from Last 3 Encounters:   01/31/18 114/73   01/17/18 120/73   03/20/17 120/82    Weight from Last 3 Encounters:   01/31/18 77.7 kg (171 lb 4.8 oz)   01/17/18 77.2 kg (170 lb 1.6 oz)   03/20/17 75 kg (165 lb 6.4 oz)              We Performed the Following     Chlamydia PCR (Clinic Collect)     Gonorrhea PCR        Primary Care Provider Office Phone # Fax #    Isac Fatima [phone]       [address] [facility] [address]        Equal Access to Services     HECTOR BOWENS : balta Henry, gianfranco troncoso " cori dulce mariayuliana kaminigavin macdonald ah. [facility] [phone].    ATENCIÓN: Si juarez heart, tiene a lin disposición servicios gratuitos de asistencia lingüística. Jennifer monson [phone].    We comply with applicable federal civil rights laws and [place] laws. We do not discriminate on the basis of race, color, national origin, age, disability, sex, sexual orientation, or gender identity.            Thank you!     Thank you for choosing [facility]  for your care. Our goal is always to provide you with excellent care. Hearing back from our patients is one way we can continue to improve our services. Please take a few minutes to complete the written survey that you may receive in the mail after your visit with us. Thank you!             Your Updated Medication List - Protect others around you: Learn how to safely use, store and throw away your medicines at www.disposemymeds.org.          This list is accurate as of 1/31/18  1:18 PM.  Always use your most recent med list.                   Brand Name Dispense Instructions for use Diagnosis    clindamycin 1 % lotion    CLEOCIN T     Apply topically 2 times daily        fluticasone 50 MCG/ACT spray    FLONASE    1 Package    Spray 2 sprays into both nostrils daily.    Allergic rhinitis, cause unspecified       ketotifen 0.025 % Soln ophthalmic solution    ZADITOR/REFRESH ANTI-ITCH    1 Bottle    Place 1 drop into both eyes 2 times daily    Chronic allergic conjunctivitis       loratadine 10 MG tablet    CLARITIN    90 tablet    Take 1 tablet by mouth daily.    Allergic rhinitis, cause unspecified       prenatal multivitamin plus iron 27-0.8 MG Tabs per tablet      Take 1 tablet by mouth daily        SPIRONOLACTONE PO      Take 25 mg by mouth        TRETIN-X 0.05 % CREAM Kit   Generic drug:  Tretinoin-Cleanser-Moisturizer           vitamin D 400 UNITS capsule      Take 1 capsule by mouth daily.

## 2018-01-31 NOTE — PROGRESS NOTES
SUBJECTIVE:   37 year old, female, , 11w1d,  who presents to the clinic today for a new ob visit.  Feels well. Has started PNV.  Estimated Date of Delivery: Aug 21, 2018   Aug 21, 2018 is calculated from Patient's last menstrual period was 2017.  She has not had bleeding since her LMP.   She has had mild nausea. Weight loss has not occurred.   This was a planned pregnancy.   FOB is involved,  Contreras.    OTHER CONCERNS: mild nausea, tolerable. Also reports occasional sciatica.    ===========================================  ROS  PSYCHIATRIC:  Denies mood changes  PHQ9: Last PHQ-9 score on record= No Value exists for the : HP#PHQ9    Social History   Substance Use Topics     Smoking status: Former Smoker     Years: 5.     Quit date: 2006     Smokeless tobacco: Never Used     Alcohol use No      Comment: occ     History   Drug Use No     History   Smoking Status     Former Smoker     Years: 5.     Quit date: 2006   Smokeless Tobacco     Never Used       Alcohol use No   Comment: occ     Family History   Problem Relation Age of Onset     HEART DISEASE Maternal Grandfather      MI     Alcohol/Drug Paternal Grandfather      alcoholism     Hypertension Mother      Depression Sister      Dementia Paternal Grandmother      Anxiety Disorder Sister      Substance Abuse Sister      Leukemia Son      acute lymphoblastic leukemia      Glaucoma No family hx of      Macular Degeneration No family hx of      ============================================  MEDICAL HISTORY   Allergies   Allergen Reactions     Penicillins Rash     Noted when child     Current Outpatient Prescriptions:      Prenatal Vit-Fe Fumarate-FA (PRENATAL MULTIVITAMIN PLUS IRON) 27-0.8 MG TABS per tablet, Take 1 tablet by mouth daily, Disp: , Rfl:      SPIRONOLACTONE PO, Take 25 mg by mouth, Disp: , Rfl:      ketotifen (ZADITOR/REFRESH ANTI-ITCH) 0.025 % SOLN, Place 1 drop into both eyes 2 times daily (Patient not taking: Reported on  "2018), Disp: 1 Bottle, Rfl: 6     Tretinoin-Cleanser-Moisturizer (TRETIN-X) 0.05 % CREAM KIT, , Disp: , Rfl:      clindamycin (CLEOCIN T) 1 % lotion, Apply topically 2 times daily, Disp: , Rfl:      fluticasone (FLONASE) 50 MCG/ACT nasal spray, Spray 2 sprays into both nostrils daily. (Patient not taking: Reported on 2018), Disp: 1 Package, Rfl: 3     loratadine (CLARITIN) 10 MG tablet, Take 1 tablet by mouth daily. (Patient not taking: Reported on 2018), Disp: 90 tablet, Rfl: 1     Cholecalciferol (VITAMIN D) 400 UNIT capsule, Take 1 capsule by mouth daily., Disp: , Rfl:     Past Medical History:   Diagnosis Date     Adjustment disorder      Asthma      Migraine headache      Seasonal allergies      Past Surgical History:   Procedure Laterality Date     DENTAL SURGERY      [place] teeth         Obstetric History       T1      L2     SAB0   TAB0   Ectopic0   Multiple0   Live Births1       # Outcome Date GA Lbr Jorge/2nd Weight Sex Delivery Anes PTL Lv   2 Current            1 Term 11 39w0d  3.884 kg (8 lb 9 oz) M  EPI N ALE        GYN History- Denies Abnormal Pap Smears; last pap 2016 NILM                        Cervical procedures: Denies                        History of STI: Denies    I personally reviewed the past social/family/medical and surgical history on the date of service.   I reviewed lab work done at Intake visit with patient.    OBJECTIVE:   PHYSICAL EXAM:  /73 (BP Location: Left arm, Patient Position: Chair)  Pulse 73  Ht 1.626 m (5' 4\")  Wt 77.7 kg (171 lb 4.8 oz)  LMP 2017  BMI 29.4 kg/m2  BMI- Body mass index is 29.4 kg/(m^2)., GENERAL:  Pleasant pregnant female, alert, cooperative and well groomed.  SKIN:  Warm and dry, without lesions or rashes  HEAD: Symmetrical features.  MOUTH:  Buccal mucosa pink, moist without lesions.  Teeth in good repair.    NECK:  Thyroid without enlargement and nodules.  Lymph nodes not palpable.   LUNGS:  Clear to " auscultation.  BREAST:    No dominant, fixed or suspicious masses are noted.  No skin or nipple changes or axillary nodes.   Nipples everted.      HEART:  RRR without murmur.  ABDOMEN: Soft without masses , tenderness or organomegaly.  No CVA tenderness.  Uterus not palpable, appropriate for GA.  No scars noted. Fetal movement heard with doppler, unable to hear fetal heart tones .  MUSCULOSKELETAL:  Full range of motion  EXTREMITIES: No edema. No significant varicosities.   PELVIC EXAM: Deferred.  WET PREP: Not done  GC/CHLAMYDIA CULTURE OBTAINED:YES    ASSESSMENT:  Intrauterine pregnancy 11w1d size consistent with dates  Genetic Screening: Declines early screening  Encounter Diagnosis   Name Primary?     High-risk pregnancy, elderly multigravida, first trimester Yes      PLAN:  - Reviewed use of triage nurse line and contacting the on-call provider after hours for an urgent need such as fever, vagina bleeding, bladder or vaginal infection, rupture of membranes,  or term labor.    - Reviewed best evidence for: weight gain for her weight and height for pregnancy:  RECOMMENDED WEIGHT GAIN: 15-25 lbs.   healthy diet and foods to avoid; exercise and activity during pregnancy;avoiding exposure to toxoplasmosis; and maintenance of a generally healthy lifestyle.   - Discussed the harms, benefits, side effects and alternative therapies for current prescribed and OTC medications.  - Discussed comfort measures for sciatica pain. Reviewed possibility of support belt, PT, yoga, etc in the future as needed.  - All pt's questions discussed and answered.  Pt verbalized understanding of and agreement to plan of care.   - Encouraged Vit D supplementation  - Pap records requested  - Desires CN care  - Offered follow-up ultrasound as fetal heart tones not heard today. Pt declined.  - Continue scheduled prenatal care and prn if questions or concerns, RTC in 3-5 weeks

## 2018-01-31 NOTE — LETTER
2018       RE: Patricia Leiv  [address] [place] [address]     Dear Colleague,    Thank you for referring your patient, Patricia Levi, to the [facility] at [facility]. Please see a copy of my visit note below.    SUBJECTIVE:   37 year old, female, , 11w1d,  who presents to the clinic today for a new ob visit.  Feels well. Has started PNV.  Estimated Date of Delivery: Aug 21, 2018   Aug 21, 2018 is calculated from Patient's last menstrual period was 2017.  She has not had bleeding since her LMP.   She has had mild nausea. Weight loss has not occurred.   This was a planned pregnancy.   FOB is involved,  Contreras.    OTHER CONCERNS: mild nausea, tolerable. Also reports occasional sciatica.    ===========================================  ROS  PSYCHIATRIC:  Denies mood changes  PHQ9: Last PHQ-9 score on record= No Value exists for the : HP#PHQ9    Social History   Substance Use Topics     Smoking status: Former Smoker     Years: 5.00     Quit date: 2006     Smokeless tobacco: Never Used     Alcohol use No      Comment: occ     History   Drug Use No     History   Smoking Status     Former Smoker     Years: 5.00     Quit date: 2006   Smokeless Tobacco     Never Used       Alcohol use No   Comment: occ     Family History   Problem Relation Age of Onset     HEART DISEASE Maternal Grandfather      MI     Alcohol/Drug Paternal Grandfather      alcoholism     Hypertension Mother      Depression Sister      Dementia Paternal Grandmother      Anxiety Disorder Sister      Substance Abuse Sister      Leukemia Son      acute lymphoblastic leukemia      Glaucoma No family hx of      Macular Degeneration No family hx of      ============================================  MEDICAL HISTORY   Allergies   Allergen Reactions     Penicillins Rash     Noted when child     Current Outpatient Prescriptions:      Prenatal Vit-Fe Fumarate-FA  "(PRENATAL MULTIVITAMIN PLUS IRON) 27-0.8 MG TABS per tablet, Take 1 tablet by mouth daily, Disp: , Rfl:      SPIRONOLACTONE PO, Take 25 mg by mouth, Disp: , Rfl:      ketotifen (ZADITOR/REFRESH ANTI-ITCH) 0.025 % SOLN, Place 1 drop into both eyes 2 times daily (Patient not taking: Reported on 2018), Disp: 1 Bottle, Rfl: 6     Tretinoin-Cleanser-Moisturizer (TRETIN-X) 0.05 % CREAM KIT, , Disp: , Rfl:      clindamycin (CLEOCIN T) 1 % lotion, Apply topically 2 times daily, Disp: , Rfl:      fluticasone (FLONASE) 50 MCG/ACT nasal spray, Spray 2 sprays into both nostrils daily. (Patient not taking: Reported on 2018), Disp: 1 Package, Rfl: 3     loratadine (CLARITIN) 10 MG tablet, Take 1 tablet by mouth daily. (Patient not taking: Reported on 2018), Disp: 90 tablet, Rfl: 1     Cholecalciferol (VITAMIN D) 400 UNIT capsule, Take 1 capsule by mouth daily., Disp: , Rfl:     Past Medical History:   Diagnosis Date     Adjustment disorder      Asthma      Migraine headache      Seasonal allergies      Past Surgical History:   Procedure Laterality Date     DENTAL SURGERY      [place] teeth         Obstetric History       T1      L2     SAB0   TAB0   Ectopic0   Multiple0   Live Births1       # Outcome Date GA Lbr Jorge/2nd Weight Sex Delivery Anes PTL Lv   2 Current            1 Term 11 39w0d  3.884 kg (8 lb 9 oz) M  EPI N ALE        GYN History- Denies Abnormal Pap Smears; last pap 2016 NILM                        Cervical procedures: Denies                        History of STI: Denies    I personally reviewed the past social/family/medical and surgical history on the date of service.   I reviewed lab work done at Intake visit with patient.    OBJECTIVE:   PHYSICAL EXAM:  /73 (BP Location: Left arm, Patient Position: Chair)  Pulse 73  Ht 1.626 m (5' 4\")  Wt 77.7 kg (171 lb 4.8 oz)  LMP 2017  BMI 29.4 kg/m2  BMI- Body mass index is 29.4 kg/(m^2)., GENERAL:  Pleasant pregnant " female, alert, cooperative and well groomed.  SKIN:  Warm and dry, without lesions or rashes  HEAD: Symmetrical features.  MOUTH:  Buccal mucosa pink, moist without lesions.  Teeth in good repair.    NECK:  Thyroid without enlargement and nodules.  Lymph nodes not palpable.   LUNGS:  Clear to auscultation.  BREAST:    No dominant, fixed or suspicious masses are noted.  No skin or nipple changes or axillary nodes.   Nipples everted.      HEART:  RRR without murmur.  ABDOMEN: Soft without masses , tenderness or organomegaly.  No CVA tenderness.  Uterus not palpable, appropriate for GA.  No scars noted. Fetal movement heard with doppler, unable to hear fetal heart tones .  MUSCULOSKELETAL:  Full range of motion  EXTREMITIES: No edema. No significant varicosities.   PELVIC EXAM: Deferred.  WET PREP: Not done  GC/CHLAMYDIA CULTURE OBTAINED:YES    ASSESSMENT:  Intrauterine pregnancy 11w1d size consistent with dates  Genetic Screening: Declines early screening  Encounter Diagnosis   Name Primary?     High-risk pregnancy, elderly multigravida, first trimester Yes      PLAN:  - Reviewed use of triage nurse line and contacting the on-call provider after hours for an urgent need such as fever, vagina bleeding, bladder or vaginal infection, rupture of membranes,  or term labor.    - Reviewed best evidence for: weight gain for her weight and height for pregnancy:  RECOMMENDED WEIGHT GAIN: 15-25 lbs.   healthy diet and foods to avoid; exercise and activity during pregnancy;avoiding exposure to toxoplasmosis; and maintenance of a generally healthy lifestyle.   - Discussed the harms, benefits, side effects and alternative therapies for current prescribed and OTC medications.  - Discussed comfort measures for sciatica pain. Reviewed possibility of support belt, PT, yoga, etc in the future as needed.  - All pt's questions discussed and answered.  Pt verbalized understanding of and agreement to plan of care.   - Encouraged Vit  D supplementation  - Pap records requested  - Desires CNM care  - Offered follow-up ultrasound as fetal heart tones not heard today. Pt declined.  - Continue scheduled prenatal care and prn if questions or concerns, RTC in 3-5 weeks    Again, thank you for allowing me to participate in the care of your patient.      Sincerely,    ESTEE RockwellM

## 2018-02-01 LAB
C TRACH DNA SPEC QL NAA+PROBE: NEGATIVE
N GONORRHOEA DNA SPEC QL NAA+PROBE: NEGATIVE
SPECIMEN SOURCE: NORMAL
SPECIMEN SOURCE: NORMAL

## 2018-02-01 ASSESSMENT — ANXIETY QUESTIONNAIRES: GAD7 TOTAL SCORE: 0

## 2018-02-01 ASSESSMENT — PATIENT HEALTH QUESTIONNAIRE - PHQ9: SUM OF ALL RESPONSES TO PHQ QUESTIONS 1-9: 0

## 2018-02-21 ENCOUNTER — OFFICE VISIT (OUTPATIENT)
Dept: OBGYN | Facility: CLINIC | Age: 38
End: 2018-02-21
Attending: ADVANCED PRACTICE MIDWIFE
Payer: COMMERCIAL

## 2018-02-21 VITALS
HEIGHT: 64 IN | SYSTOLIC BLOOD PRESSURE: 118 MMHG | WEIGHT: 173.6 LBS | DIASTOLIC BLOOD PRESSURE: 66 MMHG | BODY MASS INDEX: 29.64 KG/M2

## 2018-02-21 DIAGNOSIS — O09.529 HIGH-RISK PREGNANCY, ELDERLY MULTIGRAVIDA, UNSPECIFIED TRIMESTER: Primary | ICD-10-CM

## 2018-02-21 PROCEDURE — G0463 HOSPITAL OUTPT CLINIC VISIT: HCPCS | Mod: ZF

## 2018-02-21 ASSESSMENT — PAIN SCALES - GENERAL: PAINLEVEL: NO PAIN (0)

## 2018-02-21 NOTE — PROGRESS NOTES
"Subjective:      37 year old  at 14w1d presents for a routine prenatal appointment.       No vaginal bleeding or leakage of fluid.  No contractions or cramping.    Started feeling fetal movement this week.       No HA, visual changes, RUQ or epigastric pain.   The patient presents with the following concerns: feeling well overall. Sciatica has worsened somewhat. Has exercises to do that she received from a PT but hard to find time to do them. Open to acupuncture, which she has used in the past for other reasons. Will see if insurance covers it and let us know if she would like a referral.   Level II US  Scheduled.   Offered QS: declines all genetic testing.      Objective:  Vitals:    18 1027   BP: 118/66   Weight: 78.7 kg (173 lb 9.6 oz)   Height: 1.626 m (5' 4.02\")     See OB flowsheet    Assessment/Plan     Encounter Diagnosis   Name Primary?     High-risk pregnancy, elderly multigravida, , WHS CNM Yes     - Reviewed total weight gain, encouraged continued healthy diet and exercise.      - Reviewed why/how to contact provider.    Patient education/orders or handouts today:  Fetal movement   Return to clinic in 4 weeks and prn if questions or concerns.     ESTEE Brewster CNM                  "

## 2018-02-21 NOTE — MR AVS SNAPSHOT
After Visit Summary   2018    Patricia Levi    MRN: [id number]           Patient Information     Date Of Birth          1980        Visit Information        Provider Department      2018 10:15 AM Jennifer Patel APRN CNM [facility]        Today's Diagnoses     High-risk pregnancy, elderly multigravida, , S CNM    -  1       Follow-ups after your visit        Follow-up notes from your care team     Return in about 4 weeks (around 3/21/2018) for Return OB.      Your next 10 appointments already scheduled     Mar 28, 2018 12:45 PM CDT   Genetic Counseling with JOSE RAFAEL GEN COUNSELOR 2   [facility] - [place] ([facility])    [address]  [facility] [address]   [phone]            Mar 28, 2018  1:30 PM CDT   (Arrive by 1:15 PM)   YUE US COMP with JOSE [id number]   [facility] Ultrasound - [place] ([facility])    [address]  [facility] [id number] [phone]           Wear comfortable clothes and leave your valuables at home.            Mar 28, 2018  2:00 PM CDT   Radiology MD with JOSE RAFAEL TURNER MD   [facility] - [facility])    [address]  [facility] [address]   [phone]           Please arrive at the time given for your first appointment. This visit is used internally to schedule the physician's time during your ultrasound.            2018 10:00 AM CDT   RETURN OB with ESTEE Santana CNM   [facility] ([facility])    [place] [facility] [address]  [address]  [facility] [id number] [phone]              Who to contact     Please call your clinic at [phone] to:    Ask questions about your health    Make or cancel appointments    Discuss your medicines    Learn about your test  "results    Speak to your doctor            Additional Information About Your Visit        [email or web address] Information     [email or web address] gives you secure access to your electronic health record. If you see a primary care provider, you can also send messages to your care team and make appointments. If you have questions, please call your primary care clinic.  If you do not have a primary care provider, please call [phone] and they will assist you.      myeasydocs is an electronic gateway that provides easy, online access to your medical records. With myeasydocs, you can request a clinic appointment, read your test results, renew a prescription or communicate with your care team.     To access your existing account, please contact your [facility] or call [phone] for assistance.        Care EveryWhere ID     This is your Care EveryWhere ID. This could be used by other organizations to access your [facility] medical records  [id number]        Your Vitals Were     Height Last Period BMI (Body Mass Index)             1.626 m (5' 4.02\") 11/14/2017 29.78 kg/m2          Blood Pressure from Last 3 Encounters:   02/21/18 118/66   01/31/18 114/73   01/17/18 120/73    Weight from Last 3 Encounters:   02/21/18 78.7 kg (173 lb 9.6 oz)   01/31/18 77.7 kg (171 lb 4.8 oz)   01/17/18 77.2 kg (170 lb 1.6 oz)              Today, you had the following     No orders found for display       Primary Care Provider Office Phone # Fax #    Isac Fatima [phone]       [address] [facility] [address]        Equal Access to Services     [facility]: Hadii aad ku hadasho Soomaali, waaxda luqadaha, qaybta kaalmada jennifer, gianfranco macdonald . So [facility] [phone].    ATENCIÓN: Si habla español, tiene a lin disposición servicios gratuitos de asistencia lingüística. Llame al [phone].    We comply with applicable federal civil rights laws and [place] laws. We do not " discriminate on the basis of race, color, national origin, age, disability, sex, sexual orientation, or gender identity.            Thank you!     Thank you for choosing [facility]  for your care. Our goal is always to provide you with excellent care. Hearing back from our patients is one way we can continue to improve our services. Please take a few minutes to complete the written survey that you may receive in the mail after your visit with us. Thank you!             Your Updated Medication List - Protect others around you: Learn how to safely use, store and throw away your medicines at www.disposemymeds.org.          This list is accurate as of 2/21/18 10:52 AM.  Always use your most recent med list.                   Brand Name Dispense Instructions for use Diagnosis    clindamycin 1 % lotion    CLEOCIN T     Apply topically 2 times daily        fluticasone 50 MCG/ACT spray    FLONASE    1 Package    Spray 2 sprays into both nostrils daily.    Allergic rhinitis, cause unspecified       ketotifen 0.025 % Soln ophthalmic solution    ZADITOR/REFRESH ANTI-ITCH    1 Bottle    Place 1 drop into both eyes 2 times daily    Chronic allergic conjunctivitis       loratadine 10 MG tablet    CLARITIN    90 tablet    Take 1 tablet by mouth daily.    Allergic rhinitis, cause unspecified       prenatal multivitamin plus iron 27-0.8 MG Tabs per tablet      Take 1 tablet by mouth daily        SPIRONOLACTONE PO      Take 25 mg by mouth        TRETIN-X 0.05 % CREAM Kit   Generic drug:  Tretinoin-Cleanser-Moisturizer           vitamin D 400 UNITS capsule      Take 1 capsule by mouth daily.

## 2018-02-21 NOTE — LETTER
"2018       RE: Patricia Levi  [address] [place] [address]     Dear Colleague,    Thank you for referring your patient, Patricia Levi, to the [facility] at [facility]. Please see a copy of my visit note below.    Subjective:      37 year old  at 14w1d presents for a routine prenatal appointment.       No vaginal bleeding or leakage of fluid.  No contractions or cramping.    Started feeling fetal movement this week.       No HA, visual changes, RUQ or epigastric pain.   The patient presents with the following concerns: feeling well overall. Sciatica has worsened somewhat. Has exercises to do that she received from a PT but hard to find time to do them. Open to acupuncture, which she has used in the past for other reasons. Will see if insurance covers it and let us know if she would like a referral.   Level II US  Scheduled.   Offered QS: declines all genetic testing.      Objective:  Vitals:    18 1027   BP: 118/66   Weight: 78.7 kg (173 lb 9.6 oz)   Height: 1.626 m (5' 4.02\")     See OB flowsheet    Assessment/Plan     Encounter Diagnosis   Name Primary?     High-risk pregnancy, elderly multigravida, , WHS CNM Yes     - Reviewed total weight gain, encouraged continued healthy diet and exercise.      - Reviewed why/how to contact provider.    Patient education/orders or handouts today:  Fetal movement   Return to clinic in 4 weeks and prn if questions or concerns.       Again, thank you for allowing me to participate in the care of your patient.      Sincerely,    ESTEE Brewster CNM      "

## 2018-02-21 NOTE — NURSING NOTE
Chief Complaint   Patient presents with     Prenatal Care   14.1 weeks  Some sciatic pain-   Has some exercises to do.   Level 2 scheduled 3-

## 2018-03-26 ENCOUNTER — PRE VISIT (OUTPATIENT)
Dept: MATERNAL FETAL MEDICINE | Facility: CLINIC | Age: 38
End: 2018-03-26

## 2018-03-28 ENCOUNTER — HOSPITAL ENCOUNTER (OUTPATIENT)
Dept: ULTRASOUND IMAGING | Facility: CLINIC | Age: 38
Discharge: HOME | End: 2018-03-28
Attending: ADVANCED PRACTICE MIDWIFE | Admitting: OBSTETRICS & GYNECOLOGY
Payer: COMMERCIAL

## 2018-03-28 ENCOUNTER — OFFICE VISIT (OUTPATIENT)
Dept: MATERNAL FETAL MEDICINE | Facility: CLINIC | Age: 38
End: 2018-03-28
Attending: ADVANCED PRACTICE MIDWIFE
Payer: COMMERCIAL

## 2018-03-28 DIAGNOSIS — O26.90 PREGNANCY RELATED CONDITION, ANTEPARTUM: ICD-10-CM

## 2018-03-28 DIAGNOSIS — O09.522 ELDERLY MULTIGRAVIDA IN SECOND TRIMESTER: Primary | ICD-10-CM

## 2018-03-28 PROCEDURE — 76811 OB US DETAILED SNGL FETUS: CPT

## 2018-03-28 NOTE — MR AVS SNAPSHOT
After Visit Summary   3/28/2018    Patricia Levi    MRN: [id number]           Patient Information     Date Of Birth          1980        Visit Information        Provider Department      3/28/2018 2:00 PM Valery Arauz MD [facility]        Today's Diagnoses     Elderly multigravida in second trimester    -  1       Follow-ups after your visit        Your next 10 appointments already scheduled     Apr 04, 2018 10:00 AM CDT   RETURN OB with ESTEE Santana CNCHASITY   [facility] ([facility])    [facility] [address]  [address]  [facility] [address] [phone]              Who to contact     If you have questions or need follow up information about today's clinic visit or your schedule please contact [facility] directly at [phone].  Normal or non-critical lab and imaging results will be communicated to you by MyChart, letter or phone within 4 business days after the clinic has received the results. If you do not hear from us within 7 days, please contact the clinic through [employer] or phone. If you have a critical or abnormal lab result, we will notify you by phone as soon as possible.  Submit refill requests through [employer] or call your pharmacy and they will forward the refill request to us. Please allow 3 business days for your refill to be completed.          Additional Information About Your Visit        MyChart Information     [employer] gives you secure access to your electronic health record. If you see a primary care provider, you can also send messages to your care team and make appointments. If you have questions, please call your primary care clinic.  If you do not have a primary care provider, please call [phone] and they will assist you.        Care EveryWhere ID     This is your Care EveryWhere ID. This could be used by other organizations to  access your [facility] medical records  [id number]        Your Vitals Were     Last Period                   11/14/2017            Blood Pressure from Last 3 Encounters:   02/21/18 118/66   01/31/18 114/73   01/17/18 120/73    Weight from Last 3 Encounters:   02/21/18 78.7 kg (173 lb 9.6 oz)   01/31/18 77.7 kg (171 lb 4.8 oz)   01/17/18 77.2 kg (170 lb 1.6 oz)              Today, you had the following     No orders found for display       Primary Care Provider Office Phone # Fax #    Isac Fatima [phone] [address] [facility] [address]        Equal Access to Services     HECTOR BOWENS : Hadii ethan rodriguezo Sodiane, waaxda luqadaha, qaybta kaalmada adeegyada, gianfranco macdonald . So [facility] [phone].    ATENCIÓN: Si habla español, tiene a lin disposición servicios gratuitos de asistencia lingüística. [facility] [phone].    We comply with applicable federal civil rights laws and [place] laws. We do not discriminate on the basis of race, color, national origin, age, disability, sex, sexual orientation, or gender identity.            Thank you!     Thank you for choosing [facility]  for your care. Our goal is always to provide you with excellent care. Hearing back from our patients is one way we can continue to improve our services. Please take a few minutes to complete the written survey that you may receive in the mail after your visit with us. Thank you!             Your Updated Medication List - Protect others around you: Learn how to safely use, store and throw away your medicines at www.disposemymeds.org.          This list is accurate as of 3/28/18  5:25 PM.  Always use your most recent med list.                   Brand Name Dispense Instructions for use Diagnosis    clindamycin 1 % lotion    CLEOCIN T     Apply topically 2 times daily        fluticasone 50 MCG/ACT spray    FLONASE    1 Package    Spray 2 sprays  into both nostrils daily.    Allergic rhinitis, cause unspecified       ketotifen 0.025 % Soln ophthalmic solution    ZADITOR/REFRESH ANTI-ITCH    1 Bottle    Place 1 drop into both eyes 2 times daily    Chronic allergic conjunctivitis       loratadine 10 MG tablet    CLARITIN    90 tablet    Take 1 tablet by mouth daily.    Allergic rhinitis, cause unspecified       prenatal multivitamin plus iron 27-0.8 MG Tabs per tablet      Take 1 tablet by mouth daily        SPIRONOLACTONE PO      Take 25 mg by mouth        TRETIN-X 0.05 % CREAM Kit   Generic drug:  Tretinoin-Cleanser-Moisturizer           vitamin D 400 UNITS capsule      Take 1 capsule by mouth daily.

## 2018-03-28 NOTE — PROGRESS NOTES
"Please see \"Imaging\" tab under \"Chart Review\" for details of today's visit.    Valery Arauz    "

## 2018-03-30 ENCOUNTER — TELEPHONE (OUTPATIENT)
Dept: OBGYN | Facility: CLINIC | Age: 38
End: 2018-03-30

## 2018-04-04 ENCOUNTER — OFFICE VISIT (OUTPATIENT)
Dept: OBGYN | Facility: CLINIC | Age: 38
End: 2018-04-04
Attending: ADVANCED PRACTICE MIDWIFE
Payer: COMMERCIAL

## 2018-04-04 VITALS
HEART RATE: 77 BPM | DIASTOLIC BLOOD PRESSURE: 73 MMHG | HEIGHT: 64 IN | WEIGHT: 182 LBS | BODY MASS INDEX: 31.07 KG/M2 | SYSTOLIC BLOOD PRESSURE: 112 MMHG

## 2018-04-04 DIAGNOSIS — O09.529 HIGH-RISK PREGNANCY, ELDERLY MULTIGRAVIDA, UNSPECIFIED TRIMESTER: Primary | ICD-10-CM

## 2018-04-04 PROCEDURE — G0463 HOSPITAL OUTPT CLINIC VISIT: HCPCS | Mod: ZF

## 2018-04-04 NOTE — LETTER
"2018       RE: Patricia Levi  [address] [place] [address]     Dear Colleague,    Thank you for referring your patient, Patricia Levi, to the [facility] at [facility]. Please see a copy of my visit note below.    Subjective:     37 year old  at 20w1d presents for a routine prenatal appointment.      Denies vaginal bleeding or leakage of fluid.  Denies contractions or cramping. + fetal movement.       No HA, visual changes, RUQ or epigastric pain.   The patient presents with the following concerns:  Feeling well overall  Level II US  Results reviewed normal scan. Having a boy!     Objective:  Vitals:    18 0958   BP: 112/73   Pulse: 77   Weight: 82.6 kg (182 lb)   Height: 1.626 m (5' 4.02\")   See OB flowsheet    Assessment/Plan:  Encounter Diagnosis   Name Primary?     High-risk pregnancy, elderly multigravida, , [facility] CNM Yes     - Reviewed total weight gain, encouraged continued healthy diet and exercise.      - Reviewed why/how to contact provider.   - Patient education/orders or handouts today: PTL signs/symptoms, Fetal movement and Plan for EOB visit w labs  - Return to clinic in 4-6 weeks and prn if questions or concerns.    Again, thank you for allowing me to participate in the care of your patient.      Sincerely,    ESTEE RockwellM      "

## 2018-04-04 NOTE — MR AVS SNAPSHOT
After Visit Summary   2018    Patricia Levi    MRN: [id number]           Patient Information     Date Of Birth          1980        Visit Information        Provider Department      2018 10:00 AM Berto Richardson APRN CNM [facility]        Today's Diagnoses     High-risk pregnancy, elderly multigravida, , [facility] CNM    -  1       Follow-ups after your visit        Follow-up notes from your care team     Return in about 6 weeks (around 2018) for EOB Visit.      Your next 10 appointments already scheduled     May 10, 2018  9:30 AM CDT   Return Obstetrics Extended with ESTEE Alexander CNM   [facility] ([facility])    [facility] [address]  [address]  [address] [facility] [address] [phone]              Who to contact     Please call your clinic at [phone] to:    Ask questions about your health    Make or cancel appointments    Discuss your medicines    Learn about your test results    Speak to your doctor            Additional Information About Your Visit        [employer] Information     [employer] gives you secure access to your electronic health record. If you see a primary care provider, you can also send messages to your care team and make appointments. If you have questions, please call your primary care clinic.  If you do not have a primary care provider, please call [phone] and they will assist you.      [employer] is an electronic gateway that provides easy, online access to your medical records. With [employer], you can request a clinic appointment, read your test results, renew a prescription or communicate with your care team.     To access your existing account, please contact your [facility] or call [phone] for assistance.        Care EveryWhere ID     This is your Care EveryWhere ID. This could be used by other organizations to access your  "[place] medical records  [id number]        Your Vitals Were     Pulse Height Last Period BMI (Body Mass Index)          77 1.626 m (5' 4.02\") 11/14/2017 31.22 kg/m2         Blood Pressure from Last 3 Encounters:   04/04/18 112/73   02/21/18 118/66   01/31/18 114/73    Weight from Last 3 Encounters:   04/04/18 82.6 kg (182 lb)   02/21/18 78.7 kg (173 lb 9.6 oz)   01/31/18 77.7 kg (171 lb 4.8 oz)              Today, you had the following     No orders found for display       Primary Care Provider Office Phone # Fax #    Isac Fatima [phone] [address] [facility] [address]        Equal Access to Services     [facility] : Hadii ethan mora Sodiane, waaxda luqadaha, qaybta kaalmada adenick, gianfranco macdonald . So [facility] [phone].    ATENCIÓN: Si habla español, tiene a lin disposición servicios gratuitos de asistencia lingüística. Jennifer al [phone].    We comply with applicable federal civil rights laws and [place] laws. We do not discriminate on the basis of race, color, national origin, age, disability, sex, sexual orientation, or gender identity.            Thank you!     Thank you for choosing [facility]  for your care. Our goal is always to provide you with excellent care. Hearing back from our patients is one way we can continue to improve our services. Please take a few minutes to complete the written survey that you may receive in the mail after your visit with us. Thank you!             Your Updated Medication List - Protect others around you: Learn how to safely use, store and throw away your medicines at www.disposemymeds.org.          This list is accurate as of 4/4/18 10:12 AM.  Always use your most recent med list.                   Brand Name Dispense Instructions for use Diagnosis    clindamycin 1 % lotion    CLEOCIN T     Apply topically 2 times daily        fluticasone 50 MCG/ACT spray    FLONASE    1 " Package    Spray 2 sprays into both nostrils daily.    Allergic rhinitis, cause unspecified       ketotifen 0.025 % Soln ophthalmic solution    ZADITOR/REFRESH ANTI-ITCH    1 Bottle    Place 1 drop into both eyes 2 times daily    Chronic allergic conjunctivitis       loratadine 10 MG tablet    CLARITIN    90 tablet    Take 1 tablet by mouth daily.    Allergic rhinitis, cause unspecified       prenatal multivitamin plus iron 27-0.8 MG Tabs per tablet      Take 1 tablet by mouth daily        SPIRONOLACTONE PO      Take 25 mg by mouth        TRETIN-X 0.05 % CREAM Kit   Generic drug:  Tretinoin-Cleanser-Moisturizer           vitamin D 400 UNITS capsule      Take 1 capsule by mouth daily.

## 2018-05-10 ENCOUNTER — OFFICE VISIT (OUTPATIENT)
Dept: OBGYN | Facility: CLINIC | Age: 38
End: 2018-05-10
Attending: MIDWIFE
Payer: COMMERCIAL

## 2018-05-10 VITALS
HEART RATE: 89 BPM | WEIGHT: 186.1 LBS | HEIGHT: 64 IN | SYSTOLIC BLOOD PRESSURE: 120 MMHG | DIASTOLIC BLOOD PRESSURE: 82 MMHG | BODY MASS INDEX: 31.77 KG/M2

## 2018-05-10 DIAGNOSIS — O09.529 HIGH-RISK PREGNANCY, ELDERLY MULTIGRAVIDA, UNSPECIFIED TRIMESTER: Primary | ICD-10-CM

## 2018-05-10 LAB
BASOPHILS # BLD AUTO: 0 10E9/L (ref 0–0.2)
BASOPHILS NFR BLD AUTO: 0.2 %
DEPRECATED CALCIDIOL+CALCIFEROL SERPL-MC: 33 UG/L (ref 20–75)
DIFFERENTIAL METHOD BLD: ABNORMAL
EOSINOPHIL # BLD AUTO: 0.4 10E9/L (ref 0–0.7)
EOSINOPHIL NFR BLD AUTO: 3 %
ERYTHROCYTE [DISTWIDTH] IN BLOOD BY AUTOMATED COUNT: 12.5 % (ref 10–15)
GLUCOSE 1H P 50 G GLC PO SERPL-MCNC: 102 MG/DL (ref 60–129)
HCT VFR BLD AUTO: 37.8 % (ref 35–47)
HGB BLD-MCNC: 12.8 G/DL (ref 11.7–15.7)
IMM GRANULOCYTES # BLD: 0.1 10E9/L (ref 0–0.4)
IMM GRANULOCYTES NFR BLD: 0.6 %
LYMPHOCYTES # BLD AUTO: 1.6 10E9/L (ref 0.8–5.3)
LYMPHOCYTES NFR BLD AUTO: 13.5 %
MCH RBC QN AUTO: 30.9 PG (ref 26.5–33)
MCHC RBC AUTO-ENTMCNC: 33.9 G/DL (ref 31.5–36.5)
MCV RBC AUTO: 91 FL (ref 78–100)
MONOCYTES # BLD AUTO: 1.1 10E9/L (ref 0–1.3)
MONOCYTES NFR BLD AUTO: 8.9 %
NEUTROPHILS # BLD AUTO: 8.9 10E9/L (ref 1.6–8.3)
NEUTROPHILS NFR BLD AUTO: 73.8 %
NRBC # BLD AUTO: 0 10*3/UL
NRBC BLD AUTO-RTO: 0 /100
PLATELET # BLD AUTO: 280 10E9/L (ref 150–450)
RBC # BLD AUTO: 4.14 10E12/L (ref 3.8–5.2)
WBC # BLD AUTO: 12.1 10E9/L (ref 4–11)

## 2018-05-10 PROCEDURE — 36415 COLL VENOUS BLD VENIPUNCTURE: CPT | Performed by: MIDWIFE

## 2018-05-10 PROCEDURE — 85025 COMPLETE CBC W/AUTO DIFF WBC: CPT | Performed by: MIDWIFE

## 2018-05-10 PROCEDURE — 86780 TREPONEMA PALLIDUM: CPT | Performed by: MIDWIFE

## 2018-05-10 PROCEDURE — 82306 VITAMIN D 25 HYDROXY: CPT | Performed by: MIDWIFE

## 2018-05-10 PROCEDURE — G0463 HOSPITAL OUTPT CLINIC VISIT: HCPCS | Mod: ZF

## 2018-05-10 PROCEDURE — 82950 GLUCOSE TEST: CPT | Performed by: MIDWIFE

## 2018-05-10 NOTE — LETTER
5/10/2018       RE: Patricia Levi  [address] [place] [address]     Dear Colleague,    Thank you for referring your patient, Patricia Levi, to the [facility] at [facility]. Please see a copy of my visit note below.     37 year old, , 25w2d,   The patient presents with the following concerns: here for EOB  This is first baby for spouse Keesha.  Excited.  Planning to take LOGAN classes had quick IOL with epidural at 38 wks Joshua DOUGLAS for baby size.    PHQ-9 SCORE 2018   Total Score 0     Education completed today includes breast feeding, [facility] hand out , contraception, counting movements, signs of pre-term labor, when to present to birthplace, post partum depression, GBS, getting enough iron and labor induction.  Birth preferences reviewed: Medicated  Labor support:     Pt mom and sister     Feeding plans : Breastfeed    Contraception planned:  [facility] will consider IUD   The following labs were ordered today:       GCT, CBC w platelets, Vitamin D and Anti-treponema  Water birth consent form was not given.  Blood type:   ABO   Date Value Ref Range Status   2018 O  Final     RH(D)   Date Value Ref Range Status   2018 Pos  Final     Antibody Screen   Date Value Ref Range Status   2018 Neg  Final   , Rhogam  was notgiven.  TDAP  was notgiven. Too early   A/P:  Encounter Diagnosis   Name Primary?     High-risk pregnancy, elderly multigravida, , [facility] CNM Yes     Orders Placed This Encounter   Procedures     Glucose 1 Hour     25- OH-Vitamin D     CBC with Platelets Differential     Treponema Abs w Reflex to RPR and Titer     Needs TDAP next visit.   Continue scheduled prenatal care      Again, thank you for allowing me to participate in the care of your patient.      Sincerely,    ESTEE Alexander CNCHASITY

## 2018-05-10 NOTE — MR AVS SNAPSHOT
After Visit Summary   5/10/2018    Patricia Levi    MRN: [id number]           Patient Information     Date Of Birth          1980        Visit Information        Provider Department      5/10/2018 9:30 AM Esperanza Wadsworth APRN CNM [facility]        Today's Diagnoses     High-risk pregnancy, elderly multigravida, , [facility] CNM    -  1       Follow-ups after your visit        Follow-up notes from your care team     Return in about 4 weeks (around 2018) for ELPIDIO.      Your next 10 appointments already scheduled     2018  3:30 PM CDT   RETURN OB with ESTEE Henry CNM   [facility] ([facility])    [facility] [address]  [address]  [address]  [facility] [address] [phone]              Who to contact     Please call your clinic at [phone] to:    Ask questions about your health    Make or cancel appointments    Discuss your medicines    Learn about your test results    Speak to your doctor            Additional Information About Your Visit        MyChart Information     [employer] gives you secure access to your electronic health record. If you see a primary care provider, you can also send messages to your care team and make appointments. If you have questions, please call your primary care clinic.  If you do not have a primary care provider, please call [phone] and they will assist you.      [employer] is an electronic gateway that provides easy, online access to your medical records. With [employer], you can request a clinic appointment, read your test results, renew a prescription or communicate with your care team.     To access your existing account, please contact your [facility] or call [phone] for assistance.        Care EveryWhere ID     This is your Care EveryWhere ID. This could be used by other organizations to access your [facility]  "records  [id number]        Your Vitals Were     Pulse Height Last Period BMI (Body Mass Index)          89 1.626 m (5' 4\") 11/14/2017 31.94 kg/m2         Blood Pressure from Last 3 Encounters:   05/10/18 120/82   04/04/18 112/73   02/21/18 118/66    Weight from Last 3 Encounters:   05/10/18 84.4 kg (186 lb 1.6 oz)   04/04/18 82.6 kg (182 lb)   02/21/18 78.7 kg (173 lb 9.6 oz)              We Performed the Following     25- OH-Vitamin D     CBC with Platelets Differential     Glucose 1 Hour     Treponema Abs w Reflex to RPR and Titer          Today's Medication Changes          These changes are accurate as of 5/10/18 11:01 AM.  If you have any questions, ask your nurse or doctor.               Stop taking these medicines if you haven't already. Please contact your care team if you have questions.     ketotifen 0.025 % Soln ophthalmic solution   Commonly known as:  ZADITOR/REFRESH ANTI-ITCH   Stopped by:  Esperanza Wadsworth APRN CNM           SPIRONOLACTONE PO   Stopped by:  Esperanza Wadsworth APRN CNM           TRETIN-X 0.05 % CREAM Kit   Generic drug:  Tretinoin-Cleanser-Moisturizer   Stopped by:  Esperanza Wadsworth APRN CNM                    Primary Care Provider Office Phone # Fax #    Isac Fatima [phone]        [facility] [address]        Equal Access to Services     [facility] AH: Hadii aad ku hadasho Soomaali, waaxda luqadaha, qaybta kaalmada adeegyada, waxay jen persaud adeyuliana macdonald . So [facility] [phone].    ATENCIÓN: Si habla español, tiene a lin disposición servicios gratuitos de asistencia lingüística. Llame al [phone].    We comply with applicable federal civil rights laws and [place] laws. We do not discriminate on the basis of race, color, national origin, age, disability, sex, sexual orientation, or gender identity.            Thank you!     Thank you for choosing [facility]  for your care. Our goal is always to provide " you with excellent care. Hearing back from our patients is one way we can continue to improve our services. Please take a few minutes to complete the written survey that you may receive in the mail after your visit with us. Thank you!             Your Updated Medication List - Protect others around you: Learn how to safely use, store and throw away your medicines at www.disposemymeds.org.          This list is accurate as of 5/10/18 11:01 AM.  Always use your most recent med list.                   Brand Name Dispense Instructions for use Diagnosis    clindamycin 1 % lotion    CLEOCIN T     Apply topically 2 times daily        fluticasone 50 MCG/ACT spray    FLONASE    1 Package    Spray 2 sprays into both nostrils daily.    Allergic rhinitis, cause unspecified       loratadine 10 MG tablet    CLARITIN    90 tablet    Take 1 tablet by mouth daily.    Allergic rhinitis, cause unspecified       prenatal multivitamin plus iron 27-0.8 MG Tabs per tablet      Take 1 tablet by mouth daily        vitamin D 400 units capsule      Take 1 capsule by mouth daily.

## 2018-05-10 NOTE — PROGRESS NOTES
37 year old, , 25w2d,   The patient presents with the following concerns: here for EOB  This is first baby for spouse Keesha.  Excited.  Planning to take LOGAN classes had quick IOL with epidural at 38 wks Joshua DOUGLAS for baby size.    PHQ-9 SCORE 2018   Total Score 0     Education completed today includes breast feeding, [facility] hand out , contraception, counting movements, signs of pre-term labor, when to present to birthplace, post partum depression, GBS, getting enough iron and labor induction.  Birth preferences reviewed: Medicated  Labor support:     Pt mom and sister     Feeding plans : Breastfeed    Contraception planned:  [facility] will consider IUD   The following labs were ordered today:       GCT, CBC w platelets, Vitamin D and Anti-treponema  Water birth consent form was not given.  Blood type:   ABO   Date Value Ref Range Status   2018 O  Final     RH(D)   Date Value Ref Range Status   2018 Pos  Final     Antibody Screen   Date Value Ref Range Status   2018 Neg  Final   , Rhogam  was notgiven.  TDAP  was notgiven. Too early   A/P:  Encounter Diagnosis   Name Primary?     High-risk pregnancy, elderly multigravida, , [facility] CNM Yes     Orders Placed This Encounter   Procedures     Glucose 1 Hour     25- OH-Vitamin D     CBC with Platelets Differential     Treponema Abs w Reflex to RPR and Titer     Needs TDAP next visit.   Continue scheduled prenatal care  ESTEE Alexander CNM

## 2018-05-11 LAB — T PALLIDUM AB SER QL: NONREACTIVE

## 2018-05-23 ENCOUNTER — MYC MEDICAL ADVICE (OUTPATIENT)
Dept: OBGYN | Facility: CLINIC | Age: 38
End: 2018-05-23

## 2018-05-23 NOTE — TELEPHONE ENCOUNTER
Tried to reach Patricia but received voicemail.  Left message to call back if experiencing abdominal cramping not associated with diarrhea or if you are feeling dizzy or lightheaded. Otherwise, will send a mychart with information.

## 2018-05-30 ENCOUNTER — OFFICE VISIT (OUTPATIENT)
Dept: OPTOMETRY | Facility: CLINIC | Age: 38
End: 2018-05-30
Payer: COMMERCIAL

## 2018-05-30 DIAGNOSIS — H52.13 MYOPIA, BILATERAL: Primary | ICD-10-CM

## 2018-05-30 ASSESSMENT — VISUAL ACUITY
OS_CC: 20/20
CORRECTION_TYPE: CONTACTS
METHOD: SNELLEN - LINEAR
OD_CC: 20/20-2

## 2018-05-30 ASSESSMENT — SLIT LAMP EXAM - LIDS
COMMENTS: NORMAL
COMMENTS: NORMAL

## 2018-05-30 ASSESSMENT — REFRACTION_MANIFEST
OS_SPHERE: -3.25
OD_CYLINDER: SPHERE
OD_SPHERE: -3.00
OS_AXIS: 075
OS_CYLINDER: +0.50

## 2018-05-30 ASSESSMENT — REFRACTION_WEARINGRX
OS_AXIS: 065
OS_CYLINDER: +0.75
OD_SPHERE: -3.00
OD_AXIS: 090
OS_SPHERE: -3.00
OD_CYLINDER: +0.50

## 2018-05-30 ASSESSMENT — REFRACTION_CURRENTRX
OD_BRAND: DAILIES TOTAL 1
OD_DIAMETER: 14.1
OD_SPHERE: -2.75
OS_DIAMETER: 14.1
OS_BRAND: DAILIES TOTAL 1
OS_BASECURVE: 8.5
OD_BASECURVE: 8.5
OS_SPHERE: -2.75

## 2018-05-30 ASSESSMENT — CONF VISUAL FIELD
OS_NORMAL: 1
OD_NORMAL: 1

## 2018-05-30 ASSESSMENT — CUP TO DISC RATIO
OD_RATIO: 0.10
OS_RATIO: 0.10

## 2018-05-30 ASSESSMENT — EXTERNAL EXAM - RIGHT EYE: OD_EXAM: NORMAL

## 2018-05-30 ASSESSMENT — TONOMETRY
OD_IOP_MMHG: 12
OS_IOP_MMHG: 10
IOP_METHOD: ICARE

## 2018-05-30 ASSESSMENT — EXTERNAL EXAM - LEFT EYE: OS_EXAM: NORMAL

## 2018-05-30 NOTE — PROGRESS NOTES
A/P  1.) Myopia OU  -Fairly stable spec Rx, updated today. Minor changes possibly 2' to pregnancy  -Doing well with current CL's, continue. If right eye distance blur, consider -3.00  -Undilated ocular health unremarkable OU, monitor  -PF AT prn with CL's for allergies, if symptoms worsen can discuss alternatives    Monitor 1-2 years routine, sooner prn

## 2018-05-30 NOTE — MR AVS SNAPSHOT
After Visit Summary   5/30/2018    Patricia Levi    MRN: [id number]           Patient Information     Date Of Birth          1980        Visit Information        Provider Department      5/30/2018 1:00 PM Anneliese Sr, [facility]        Today's Diagnoses     Myopia, bilateral    -  1       Follow-ups after your visit        Your next 10 appointments already scheduled     Jun 07, 2018  3:30 PM CDT   RETURN OB with ESTEE Henry CNM   [facility] ([facility])    [facility] [address]  [address]  [facility] [address] [phone]              Who to contact     Please call your clinic at [phone] to:    Ask questions about your health    Make or cancel appointments    Discuss your medicines    Learn about your test results    Speak to your doctor            Additional Information About Your Visit        MyChart Information     [email or web address] gives you secure access to your electronic health record. If you see a primary care provider, you can also send messages to your care team and make appointments. If you have questions, please call your primary care clinic.  If you do not have a primary care provider, please call [phone] and they will assist you.      [employer] is an electronic gateway that provides easy, online access to your medical records. With [email or web address], you can request a clinic appointment, read your test results, renew a prescription or communicate with your care team.     To access your existing account, please contact your [facility] or call [phone] for assistance.        Care EveryWhere ID     This is your Care EveryWhere ID. This could be used by other organizations to access your [place] medical records  [id number]        Your Vitals Were     Last Period                   11/14/2017            Blood Pressure from Last 3 Encounters:   05/10/18 120/82    04/04/18 112/73   02/21/18 118/66    Weight from Last 3 Encounters:   05/10/18 84.4 kg (186 lb 1.6 oz)   04/04/18 82.6 kg (182 lb)   02/21/18 78.7 kg (173 lb 9.6 oz)              We Performed the Following     REFRACTION [[id number]]        Primary Care Provider Office Phone # Fax #    Isac Fatima [phone]       [address] [facility] [address]        Equal Access to Services     [facility]: Hadii aad ku hadasho Soomaali, waaxda luqadaha, qaybta kaalmada adeegyada, waxtr macdonald . So [facility] [phone].    ATENCIÓN: Si habla español, tiene a lin disposición servicios gratuitos de asistencia lingüística. [facility] [phone].    We comply with applicable federal civil rights laws and [place] laws. We do not discriminate on the basis of race, color, national origin, age, disability, sex, sexual orientation, or gender identity.            Thank you!     Thank you for choosing [facility]  for your care. Our goal is always to provide you with excellent care. Hearing back from our patients is one way we can continue to improve our services. Please take a few minutes to complete the written survey that you may receive in the mail after your visit with us. Thank you!             Your Updated Medication List - Protect others around you: Learn how to safely use, store and throw away your medicines at www.disposemymeds.org.          This list is accurate as of 5/30/18  1:27 PM.  Always use your most recent med list.                   Brand Name Dispense Instructions for use Diagnosis    clindamycin 1 % lotion    CLEOCIN T     Apply topically 2 times daily        fluticasone 50 MCG/ACT spray    FLONASE    1 Package    Spray 2 sprays into both nostrils daily.    Allergic rhinitis, cause unspecified       loratadine 10 MG tablet    CLARITIN    90 tablet    Take 1 tablet by mouth daily.    Allergic rhinitis, cause unspecified       prenatal multivitamin plus iron 27-0.8  MG Tabs per tablet      Take 1 tablet by mouth daily        vitamin D 400 units capsule      Take 1 capsule by mouth daily.

## 2018-06-07 ENCOUNTER — OFFICE VISIT (OUTPATIENT)
Dept: OBGYN | Facility: CLINIC | Age: 38
End: 2018-06-07
Attending: MIDWIFE
Payer: COMMERCIAL

## 2018-06-07 VITALS
BODY MASS INDEX: 32.44 KG/M2 | HEIGHT: 64 IN | WEIGHT: 190 LBS | HEART RATE: 99 BPM | DIASTOLIC BLOOD PRESSURE: 78 MMHG | SYSTOLIC BLOOD PRESSURE: 123 MMHG

## 2018-06-07 DIAGNOSIS — O09.93 HRP (HIGH RISK PREGNANCY), THIRD TRIMESTER: Primary | ICD-10-CM

## 2018-06-07 PROCEDURE — 90471 IMMUNIZATION ADMIN: CPT

## 2018-06-07 PROCEDURE — 25000128 H RX IP 250 OP 636: Mod: ZF

## 2018-06-07 PROCEDURE — G0463 HOSPITAL OUTPT CLINIC VISIT: HCPCS | Mod: ZF

## 2018-06-07 PROCEDURE — 90715 TDAP VACCINE 7 YRS/> IM: CPT | Mod: ZF

## 2018-06-07 NOTE — MR AVS SNAPSHOT
"              After Visit Summary   6/7/2018    Patricia Levi    MRN: [id number]           Patient Information     Date Of Birth          1980        Visit Information        Provider Department      6/7/2018 3:30 PM Carmen Pederson APRN CNM [facility]        Today's Diagnoses     HRP (high risk pregnancy), third trimester    -  1       Follow-ups after your visit        Follow-up notes from your care team     Return in about 2 weeks (around 6/21/2018) for JENNIFER      Who to contact     Please call your clinic at [phone] to:    Ask questions about your health    Make or cancel appointments    Discuss your medicines    Learn about your test results    Speak to your doctor            Additional Information About Your Visit        [employer]ful Information     [employer] gives you secure access to your electronic health record. If you see a primary care provider, you can also send messages to your care team and make appointments. If you have questions, please call your primary care clinic.  If you do not have a primary care provider, please call [phone] and they will assist you.      [employer] is an electronic gateway that provides easy, online access to your medical records. With [employer], you can request a clinic appointment, read your test results, renew a prescription or communicate with your care team.     To access your existing account, please contact your [facility] or call [phone] for assistance.        Care EveryWhere ID     This is your Care EveryWhere ID. This could be used by other organizations to access your [place] medical records  [id number]        Your Vitals Were     Pulse Height Last Period BMI (Body Mass Index)          99 1.626 m (5' 4\") 11/14/2017 32.61 kg/m2         Blood Pressure from Last 3 Encounters:   06/07/18 123/78   05/10/18 120/82   04/04/18 112/73    Weight from Last 3 Encounters:   06/07/18 86.2 kg (190 lb)   05/10/18 " 84.4 kg (186 lb 1.6 oz)   04/04/18 82.6 kg (182 lb)              Today, you had the following     No orders found for display       Primary Care Provider Office Phone # Fax #    Isac Fatima [phone] [address] [facility] [address]        Equal Access to Services     : Hadii aad ku hadasho Soomaali, waaxda luqadaha, qaybta kaalmada adeegyada, waxay idiin hayaan adeeg khisabellash laLaurieaan . So [facility] [phone].    ATENCIÓN: Si habla español, tiene a lin disposición servicios gratuitos de asistencia lingüística. Lucyluis al [phone].    We comply with applicable federal civil rights laws and [place] laws. We do not discriminate on the basis of race, color, national origin, age, disability, sex, sexual orientation, or gender identity.            Thank you!     Thank you for choosing [facility]  for your care. Our goal is always to provide you with excellent care. Hearing back from our patients is one way we can continue to improve our services. Please take a few minutes to complete the written survey that you may receive in the mail after your visit with us. Thank you!             Your Updated Medication List - Protect others around you: Learn how to safely use, store and throw away your medicines at www.disposemymeds.org.          This list is accurate as of 6/7/18  3:51 PM.  Always use your most recent med list.                   Brand Name Dispense Instructions for use Diagnosis    clindamycin 1 % lotion    CLEOCIN T     Apply topically 2 times daily        fluticasone 50 MCG/ACT spray    FLONASE    1 Package    Spray 2 sprays into both nostrils daily.    Allergic rhinitis, cause unspecified       loratadine 10 MG tablet    CLARITIN    90 tablet    Take 1 tablet by mouth daily.    Allergic rhinitis, cause unspecified       prenatal multivitamin plus iron 27-0.8 MG Tabs per tablet      Take 1 tablet by mouth daily        vitamin D 400 units  capsule      Take 1 capsule by mouth daily.

## 2018-06-07 NOTE — LETTER
"2018       RE: Patricia Levi  [address] [facility] [place] [address]     Dear Colleague,    Thank you for referring your patient, Patricia Levi, to the [facility] at [facility]. Please see a copy of my visit note below.    Subjective:      37 year old  at 29w2d presentst for a routine prenatal appointment.    Denies vaginal bleeding or leakage of fluid.  Denies contractions. Reports regular fetal movement.       No HA, visual changes, RUQ or epigastric pain.   Patient concerns: Some trouble with sleep, hygiene discussed.   Feeling well overall.  Reviewed EOB labs with patient.  Tdap given today.  Objective:  Vitals:    18 1527   BP: 123/78   BP Location: Left arm   Patient Position: Chair   Pulse: 99   Weight: 86.2 kg (190 lb)   Height: 1.626 m (5' 4\")   , see ob flowsheet  Assessment/Plan     Encounter Diagnosis   Name Primary?     HRP (high risk pregnancy), third trimester Yes         ABO   Date Value Ref Range Status   2018 O  Final     RH(D)   Date Value Ref Range Status   2018 Pos  Final     Antibody Screen   Date Value Ref Range Status   2018 Neg  Final       - Reviewed total weight gain, encouraged continued healthy diet and exercise.  .  Reviewed importance of daily fetal kick count and why/how to contact provider.    - Reviewed why/how to contact provider if headache/visual changes/RUQ or epigastric pain, decreased fetal movement, vaginal bleeding, leakage of fluid or more than 4 contractions in an hour.     Patient education/orders or handouts today:  PTL signs/symptoms, TDAP and encouraged patient to check with [employer] for portable breast pump, plans to go back to school one week after EDC.    Return to clinic in 2 weeks and prn if questions or concerns.     ESTEE Henry CNM            "

## 2018-06-07 NOTE — PROGRESS NOTES
"Subjective:      37 year old  at 29w2d presentst for a routine prenatal appointment.    Denies vaginal bleeding or leakage of fluid.  Denies contractions. Reports regular fetal movement.       No HA, visual changes, RUQ or epigastric pain.   Patient concerns: Some trouble with sleep, hygiene discussed.   Feeling well overall.  Reviewed EOB labs with patient.  Tdap given today.  Objective:  Vitals:    18 1527   BP: 123/78   BP Location: Left arm   Patient Position: Chair   Pulse: 99   Weight: 86.2 kg (190 lb)   Height: 1.626 m (5' 4\")   , see ob flowsheet  Assessment/Plan     Encounter Diagnosis   Name Primary?     HRP (high risk pregnancy), third trimester Yes         ABO   Date Value Ref Range Status   2018 O  Final     RH(D)   Date Value Ref Range Status   2018 Pos  Final     Antibody Screen   Date Value Ref Range Status   2018 Neg  Final       - Reviewed total weight gain, encouraged continued healthy diet and exercise.  .  Reviewed importance of daily fetal kick count and why/how to contact provider.    - Reviewed why/how to contact provider if headache/visual changes/RUQ or epigastric pain, decreased fetal movement, vaginal bleeding, leakage of fluid or more than 4 contractions in an hour.     Patient education/orders or handouts today:  PTL signs/symptoms, TDAP and encouraged patient to check with [employer] for portable breast pump, plans to go back to school one week after EDC.    Return to clinic in 2 weeks and prn if questions or concerns.     ESTEE Henry CNM    "

## 2018-06-22 ENCOUNTER — OFFICE VISIT (OUTPATIENT)
Dept: OBGYN | Facility: CLINIC | Age: 38
End: 2018-06-22
Attending: ADVANCED PRACTICE MIDWIFE
Payer: COMMERCIAL

## 2018-06-22 VITALS
HEART RATE: 89 BPM | DIASTOLIC BLOOD PRESSURE: 79 MMHG | BODY MASS INDEX: 33.22 KG/M2 | HEIGHT: 64 IN | SYSTOLIC BLOOD PRESSURE: 119 MMHG | WEIGHT: 194.6 LBS

## 2018-06-22 DIAGNOSIS — O09.529 HIGH-RISK PREGNANCY, ELDERLY MULTIGRAVIDA, UNSPECIFIED TRIMESTER: ICD-10-CM

## 2018-06-22 DIAGNOSIS — O09.529 HIGH-RISK PREGNANCY, ELDERLY MULTIGRAVIDA, UNSPECIFIED TRIMESTER: Primary | ICD-10-CM

## 2018-06-22 PROCEDURE — 76816 OB US FOLLOW-UP PER FETUS: CPT | Mod: ZF

## 2018-06-22 PROCEDURE — G0463 HOSPITAL OUTPT CLINIC VISIT: HCPCS | Mod: ZF

## 2018-06-22 NOTE — MR AVS SNAPSHOT
After Visit Summary   2018    Patricia Levi    MRN: [id number]           Patient Information     Date Of Birth          1980        Visit Information        Provider Department      2018 2:00 PM [facility] ULTRASOUND [facility]        Today's Diagnoses     Large for dates        High-risk pregnancy, elderly multigravida, , [facility] CNM           Follow-ups after your visit        Your next 10 appointments already scheduled     2018  2:00 PM CDT   RETURN OB with Boogie Payton CNM   [facility] ([facility])    [facility] [address]  [address]  [address]  [facility] [address] [phone]              Who to contact     Please call your clinic at [phone] to:    Ask questions about your health    Make or cancel appointments    Discuss your medicines    Learn about your test results    Speak to your doctor            Additional Information About Your Visit        [email or web address] Information     [facility] gives you secure access to your electronic health record. If you see a primary care provider, you can also send messages to your care team and make appointments. If you have questions, please call your primary care clinic.  If you do not have a primary care provider, please call [phone] and they will assist you.      [facility] is an electronic gateway that provides easy, online access to your medical records. With [facility], you can request a clinic appointment, read your test results, renew a prescription or communicate with your care team.     To access your existing account, please contact your [facility] or call [phone] for assistance.        Care EveryWhere ID     This is your Care EveryWhere ID. This could be used by other organizations to access your [place] medical records  [id number]        Your Vitals Were     Last Period                    11/14/2017            Blood Pressure from Last 3 Encounters:   06/22/18 119/79   06/07/18 123/78   05/10/18 120/82    Weight from Last 3 Encounters:   06/22/18 88.3 kg (194 lb 9.6 oz)   06/07/18 86.2 kg (190 lb)   05/10/18 84.4 kg (186 lb 1.6 oz)              We Performed the Following     Growth Ultrasound [address]        Primary Care Provider Office Phone # Fax #    Isac Fatima [phone] [address] [facility] [address]        Equal Access to Services     [facility]: Hadii aad ku hadasho Soshannaali, waaxda luqadaha, qaybta kaalmada adeegyada, gianfranco macdonald . So [facility] [phone].    ATENCIÓN: Si habla español, tiene a lin disposición servicios gratuitos de asistencia lingüística. LlJ.W[facility] [phone].    We comply with applicable federal civil rights laws and [place] laws. We do not discriminate on the basis of race, color, national origin, age, disability, sex, sexual orientation, or gender identity.            Thank you!     Thank you for choosing [facility]  for your care. Our goal is always to provide you with excellent care. Hearing back from our patients is one way we can continue to improve our services. Please take a few minutes to complete the written survey that you may receive in the mail after your visit with us. Thank you!             Your Updated Medication List - Protect others around you: Learn how to safely use, store and throw away your medicines at www.disposemymeds.org.          This list is accurate as of 6/22/18  5:12 PM.  Always use your most recent med list.                   Brand Name Dispense Instructions for use Diagnosis    clindamycin 1 % lotion    CLEOCIN T     Apply topically 2 times daily        fluticasone 50 MCG/ACT spray    FLONASE    1 Package    Spray 2 sprays into both nostrils daily.    Allergic rhinitis, cause unspecified       loratadine 10 MG tablet    CLARITIN    90 tablet    Take 1  tablet by mouth daily.    Allergic rhinitis, cause unspecified       prenatal multivitamin plus iron 27-0.8 MG Tabs per tablet      Take 1 tablet by mouth daily        vitamin D 400 units capsule      Take 1 capsule by mouth daily.

## 2018-06-22 NOTE — LETTER
"2018       RE: Patricia Levi  [address] [facility] [facility] [address] [facility] [place] [address]     Dear Colleague,    Thank you for referring your patient, Patricia Levi, to the [facility] at [facility]. Please see a copy of my visit note below.    Subjective:      37 year old  at 31w3d presentst for a routine prenatal appointment.     Denies cramping/contractions, vaginal bleeding, discharge or leakage of fluid. Report +fetal movement.  No HA, vision changes, ruq/epigastric pain.      Patient concerns:   Feeling well overall. States she \"feels more pregnant.\" Reports some heartburn that started 1 week ago. Has not tried any medications yet. Mostly occurring at night and has helped when she sits up after eating.     Objective:  Vitals:    18 1325   BP: 119/79   BP Location: Left arm   Patient Position: Chair   Pulse: 89   Weight: 88.3 kg (194 lb 9.6 oz)   Height: 1.626 m (5' 4\")     See ob flowsheet    Assessment/Plan     Encounter Diagnosis   Name Primary?     High-risk pregnancy, elderly multigravida, , [facility] CNM Yes     Orders Placed This Encounter   Procedures     Growth Ultrasound [address]     - Reviewed total weight gain, encouraged continued healthy diet and exercise.  .  Reviewed importance of daily fetal kick count and why/how to contact provider.    - Reviewed why/how to contact provider if headache/visual changes/RUQ or epigastric pain, decreased fetal movement, vaginal bleeding, leakage of fluid or more than 4 contractions in an hour.     Patient education/orders or handouts today:  PTL signs/symptoms    Fundal height greater than dates. Recommended growth ultrasound.   Scheduled for u/s today.   Growth u/s today. RTC for next ELPIDIO appt in 2-3 weeks and prn if questions or concerns.     ESTEE Heller, CNM    Addendum:    UItrasound:  Presentation - cephalic     USEGA = 33 5/7 weeks.  EFW = 2,204 grams, 93 % for 31 weeks.     SANAM = 16.3cm.  " FHR = 132bpm      Placenta posterior and grade 0     Comments: 93% percentile, normal fluid.     Findings discussed with patient.     Further studies as clinically indicated.    Again, thank you for allowing me to participate in the care of your patient.    Boogie Payton CNM

## 2018-06-22 NOTE — MR AVS SNAPSHOT
After Visit Summary   2018    Patricia Levi    MRN: [id number]           Patient Information     Date Of Birth          1980        Visit Information        Provider Department      2018 1:30 PM Boogie Payton CNM [facility]        Today's Diagnoses     High-risk pregnancy, elderly multigravida, , [facility] CNM    -  1    Large for dates           Follow-ups after your visit        Follow-up notes from your care team     Return in about 3 weeks (around 2018) for SAVANNAH HUDDLESTON 2-3.      Your next 10 appointments already scheduled     2018  2:00 PM CDT   RETURN OB with Boogie Payton CNM   [facility] ([facility])    [facility] [address]  [address]  [address]  [facility] [address] [phone]              Who to contact     Please call your clinic at [phone] to:    Ask questions about your health    Make or cancel appointments    Discuss your medicines    Learn about your test results    Speak to your doctor            Additional Information About Your Visit        [employer] Information     [email or web address] gives you secure access to your electronic health record. If you see a primary care provider, you can also send messages to your care team and make appointments. If you have questions, please call your primary care clinic.  If you do not have a primary care provider, please call [phone] and they will assist you.      [email or web address] is an electronic gateway that provides easy, online access to your medical records. With [email or web address], you can request a clinic appointment, read your test results, renew a prescription or communicate with your care team.     To access your existing account, please contact your [facility] or call [phone] for assistance.        Care EveryWhere ID     This is your Care EveryWhere ID. This could be used by  "other organizations to access your [place] medical records  [id number]        Your Vitals Were     Pulse Height Last Period BMI (Body Mass Index)          89 1.626 m (5' 4\") 11/14/2017 33.4 kg/m2         Blood Pressure from Last 3 Encounters:   06/22/18 119/79   06/07/18 123/78   05/10/18 120/82    Weight from Last 3 Encounters:   06/22/18 88.3 kg (194 lb 9.6 oz)   06/07/18 86.2 kg (190 lb)   05/10/18 84.4 kg (186 lb 1.6 oz)               Primary Care Provider Office Phone # Fax #    Isac Fatima [phone]       [address] [facility] [address]        Equal Access to Services     [facility] : Hadii ethan rodriguezo Sodiane, waaxda luqadaha, qaybta kaalmada adeegyada, gianfranco macdonald . So [facility] [phone].    ATENCIÓN: Si habla español, tiene a lin disposición servicios gratuitos de asistencia lingüística. Jennifer al [phone].    We comply with applicable federal civil rights laws and [place] laws. We do not discriminate on the basis of race, color, national origin, age, disability, sex, sexual orientation, or gender identity.            Thank you!     Thank you for choosing [facility]  for your care. Our goal is always to provide you with excellent care. Hearing back from our patients is one way we can continue to improve our services. Please take a few minutes to complete the written survey that you may receive in the mail after your visit with us. Thank you!             Your Updated Medication List - Protect others around you: Learn how to safely use, store and throw away your medicines at www.disposemymeds.org.          This list is accurate as of 6/22/18 11:59 PM.  Always use your most recent med list.                   Brand Name Dispense Instructions for use Diagnosis    clindamycin 1 % lotion    CLEOCIN T     Apply topically 2 times daily        fluticasone 50 MCG/ACT spray    FLONASE    1 Package    Spray 2 sprays into " both nostrils daily.    Allergic rhinitis, cause unspecified       loratadine 10 MG tablet    CLARITIN    90 tablet    Take 1 tablet by mouth daily.    Allergic rhinitis, cause unspecified       prenatal multivitamin plus iron 27-0.8 MG Tabs per tablet      Take 1 tablet by mouth daily        vitamin D 400 units capsule      Take 1 capsule by mouth daily.

## 2018-06-22 NOTE — PROGRESS NOTES
37 year old female, , presents at 31 3/7 weeks for obstetric ultrasound assessment indicated by size greater than dates.    Single fetus    Presentation - cephalic    USEGA = 33 5/7 weeks.  EFW = 2,204 grams, 93 % for 31 weeks.    SANAM = 16.3cm.  FHR = 132bpm     Placenta posterior and grade 0    Comments: 93% percentile, normal fluid.    Findings discussed with patient.    Further studies as clinically indicated.    JAMAL Baires MD MPH

## 2018-06-22 NOTE — PROGRESS NOTES
"Subjective:      37 year old  at 31w3d presentst for a routine prenatal appointment.     Denies cramping/contractions, vaginal bleeding, discharge or leakage of fluid. Report +fetal movement.  No HA, vision changes, ruq/epigastric pain.      Patient concerns:   Feeling well overall. States she \"feels more pregnant.\" Reports some heartburn that started 1 week ago. Has not tried any medications yet. Mostly occurring at night and has helped when she sits up after eating.     Objective:  Vitals:    18 1325   BP: 119/79   BP Location: Left arm   Patient Position: Chair   Pulse: 89   Weight: 88.3 kg (194 lb 9.6 oz)   Height: 1.626 m (5' 4\")     See ob flowsheet    Assessment/Plan     Encounter Diagnosis   Name Primary?     High-risk pregnancy, elderly multigravida, , WHS CNM Yes     Orders Placed This Encounter   Procedures     Growth Ultrasound 66274     - Reviewed total weight gain, encouraged continued healthy diet and exercise.  .  Reviewed importance of daily fetal kick count and why/how to contact provider.    - Reviewed why/how to contact provider if headache/visual changes/RUQ or epigastric pain, decreased fetal movement, vaginal bleeding, leakage of fluid or more than 4 contractions in an hour.     Patient education/orders or handouts today:  PTL signs/symptoms    Fundal height greater than dates. Recommended growth ultrasound.   Scheduled for u/s today.   Growth u/s today. RTC for next ELPIDIO appt in 2-3 weeks and prn if questions or concerns.     ESTEE Heller, CNM    Addendum:    UItrasound:  Presentation - cephalic     USEGA = 33 5/7 weeks.  EFW = 2,204 grams, 93 % for 31 weeks.     SANAM = 16.3cm.  FHR = 132bpm      Placenta posterior and grade 0     Comments: 93% percentile, normal fluid.     Findings discussed with patient.     Further studies as clinically indicated.    "

## 2018-06-22 NOTE — LETTER
2018       RE: Patricia Levi  [address] [facility] [place] [address]     Dear Colleague,    Thank you for referring your patient, Patricia Levi, to the [facility] at [facility]. Please see a copy of my visit note below.    37 year old female, , presents at 31 3/7 weeks for obstetric ultrasound assessment indicated by size greater than dates.    Single fetus    Presentation - cephalic    USEGA = 33 5/7 weeks.  EFW = 2,204 grams, 93 % for 31 weeks.    SANAM = 16.3cm.  FHR = 132bpm     Placenta posterior and grade 0    Comments: 93% percentile, normal fluid.    Findings discussed with patient.    Further studies as clinically indicated.    JAMAL Baires MD MPH            Again, thank you for allowing me to participate in the care of your patient.      Sincerely,    [facility] Ultrasound

## 2018-07-11 ENCOUNTER — OFFICE VISIT (OUTPATIENT)
Dept: OBGYN | Facility: CLINIC | Age: 38
End: 2018-07-11
Attending: ADVANCED PRACTICE MIDWIFE
Payer: COMMERCIAL

## 2018-07-11 VITALS
SYSTOLIC BLOOD PRESSURE: 117 MMHG | WEIGHT: 199.3 LBS | DIASTOLIC BLOOD PRESSURE: 78 MMHG | HEART RATE: 87 BPM | BODY MASS INDEX: 34.02 KG/M2 | HEIGHT: 64 IN

## 2018-07-11 DIAGNOSIS — O09.529 HIGH-RISK PREGNANCY, ELDERLY MULTIGRAVIDA, UNSPECIFIED TRIMESTER: Primary | ICD-10-CM

## 2018-07-11 DIAGNOSIS — K64.4 EXTERNAL HEMORRHOIDS: ICD-10-CM

## 2018-07-11 PROCEDURE — G0463 HOSPITAL OUTPT CLINIC VISIT: HCPCS | Mod: ZF

## 2018-07-11 NOTE — MR AVS SNAPSHOT
After Visit Summary   2018    Patricia Levi    MRN: [id number]           Patient Information     Date Of Birth          1980        Visit Information        Provider Department      2018 2:00 PM Boogie Payton CNM [facility]        Today's Diagnoses     High-risk pregnancy, elderly multigravida, , WHS CNM    -  1    External hemorrhoids           Follow-ups after your visit        Follow-up notes from your care team     Return in about 2 weeks (around 2018) for SAVANNAH HUDDLESTON.      Your next 10 appointments already scheduled     2018 10:45 AM CDT   RETURN OB with ESTEE Hurtado CNM   [facility] ([facility])    [facility] [address]  [address]  [facility] [id number] [phone]              Who to contact     Please call your clinic at [phone] to:    Ask questions about your health    Make or cancel appointments    Discuss your medicines    Learn about your test results    Speak to your doctor            Additional Information About Your Visit        MyChart Information     mohchi gives you secure access to your electronic health record. If you see a primary care provider, you can also send messages to your care team and make appointments. If you have questions, please call your primary care clinic.  If you do not have a primary care provider, please call [phone] and they will assist you.      mohchi is an electronic gateway that provides easy, online access to your medical records. With mohchi, you can request a clinic appointment, read your test results, renew a prescription or communicate with your care team.     To access your existing account, please contact your [facility] or call [phone] for assistance.        Care EveryWhere ID     This is your Care EveryWhere ID. This could be used by other  "organizations to access your [facility] medical records  [id number]        Your Vitals Were     Pulse Height Last Period BMI (Body Mass Index)          87 1.626 m (5' 4\") 11/14/2017 34.21 kg/m2         Blood Pressure from Last 3 Encounters:   07/11/18 117/78   06/22/18 119/79   06/07/18 123/78    Weight from Last 3 Encounters:   07/11/18 90.4 kg (199 lb 4.8 oz)   06/22/18 88.3 kg (194 lb 9.6 oz)   06/07/18 86.2 kg (190 lb)              Today, you had the following     No orders found for display         Today's Medication Changes          These changes are accurate as of 7/11/18 11:59 PM.  If you have any questions, ask your nurse or doctor.               Start taking these medicines.        Dose/Directions    hydrocortisone 2.5 % cream   Commonly known as:  ANUSOL-HC   Used for:  High-risk pregnancy, elderly multigravida, unspecified trimester, External hemorrhoids        Place rectally 2 times daily as needed for hemorrhoids   Quantity:  30 g   Refills:  0            Where to get your medicines      These medications were sent to [facility] [address]  [address] [address]     Phone:  [phone]     hydrocortisone 2.5 % cream                Primary Care Provider Office Phone # Fax #    Isac Fatima [phone]       [address] [facility] [address]        Equal Access to Services     HECTOR BOWENS : Valentino rodriguezo Sodiane, waaxda luqadaha, qaybta kaalmada jennifer, waxay jen macdonald . So [facility] [phone].    ATENCIÓN: Si habla español, tiene a lin disposición servicios gratuitos de asistencia lingüística. Llame al [phone].    We comply with applicable federal civil rights laws and [place] laws. We do not discriminate on the basis of race, color, national origin, age, disability, sex, sexual orientation, or gender identity.            Thank you!     Thank you for choosing [facility]  for your care. Our goal is always to provide you with excellent care. Hearing back from our patients is one way we can continue to improve our services. Please take a few minutes to complete the written survey that you may receive in the mail after your visit with us. Thank you!             Your Updated Medication List - Protect others around you: Learn how to safely use, store and throw away your medicines at www.disposemymeds.org.          This list is accurate as of 7/11/18 11:59 PM.  Always use your most recent med list.                   Brand Name Dispense Instructions for use Diagnosis    clindamycin 1 % lotion    CLEOCIN T     Apply topically 2 times daily        fluticasone 50 MCG/ACT spray    FLONASE    1 Package    Spray 2 sprays into both nostrils daily.    Allergic rhinitis, cause unspecified       hydrocortisone 2.5 % cream    ANUSOL-HC    30 g    Place rectally 2 times daily as needed for hemorrhoids    High-risk pregnancy, elderly multigravida, unspecified trimester, External hemorrhoids       loratadine 10 MG tablet    CLARITIN    90 tablet    Take 1 tablet by mouth daily.    Allergic rhinitis, cause unspecified       prenatal multivitamin plus iron 27-0.8 MG Tabs per tablet      Take 1 tablet by mouth daily        vitamin D 400 units capsule      Take 1 capsule by mouth daily.

## 2018-07-11 NOTE — LETTER
"2018       RE: Patricia Levi  [address] [facility] [place] [address]     Dear Colleague,    Thank you for referring your patient, Patricia Levi, to the [facility] at [facility]. Please see a copy of my visit note below.    Subjective:      37 year old  at 34w1d presents for a routine prenatal appointment.     Denies cramping/contractions, vaginal bleeding, discharge or leakage of fluid. Report +fetal movement.  No HA, vision changes, ruq/epigastric pain.      Patient concerns:  Feeling well overall.   1) Patient states she developed an external hemorrhoid last week. States that it is about the size of a grape. Had a lot of pain but feels that pain is improving. She states she noticed some bleeding yesterday, mostly on the tissue when wiping. No blood in the stool. Reports that she has been somewhat constipated recently. States that she has had some relief with Epsom Salt Baths, Sitz Baths, Witch Hazel, Preparation H Cream and Tylenol. She also has been trying to eat foods high in fiber.   2) Patient feels her heartburn has improved with lifestyle measures and TUMS.     Had u/s on  for FH > dates. EFW 93%tile.    Objective:  Vitals:    18 1359   BP: 117/78   BP Location: Left arm   Patient Position: Chair   Pulse: 87   Weight: 90.4 kg (199 lb 4.8 oz)   Height: 1.626 m (5' 4\")      See OB flowsheet    Rectal Exam: 2 cm external hemorrhoid; mildly tender to palpation    Does not appear to be thrombosed    Assessment/Plan     Encounter Diagnoses   Name Primary?     High-risk pregnancy, elderly multigravida, , WHS CNM Yes     External hemorrhoids      Orders Placed This Encounter   Medications     hydrocortisone (ANUSOL-HC) 2.5 % cream     Sig: Place rectally 2 times daily as needed for hemorrhoids     Dispense:  30 g     Refill:  0     - Labor signs discussed. Reinforced daily fetal movement counts.  - Reviewed why/how to contact " "provider if headache/visual changes/RUQ or epigastric pain, decreased fetal movement, vaginal bleeding, leakage of fluid.    - Recommended to continue at-home relief measures for external hemorrhoids.   Prescription sent for Anusol 2.5% cream.  2% and 4% lidocaine cream given.     - Reviewed ultrasound. Discussed no recommendation for IOL with lga. Reviewed c/s could be recommended if EFW >5000gm.   Further u/s only if indicated.    - GBS and CBC with plts at 36 weeks.     Continue scheduled prenatal care, RTC in 2 weeks and prn if questions or concerns.     I, Maris BLAIR-S2 am serving as a scribe to document services personally performed by CNM based on the provider's statements to me.\" - Maris SOLERS2     The encounter was performed by me and scribed by the PA . The scribed note accurately reflects my personal services and decisions made by me.     ESTEE Heller, KARO   "

## 2018-07-11 NOTE — PROGRESS NOTES
"Subjective:      37 year old  at 34w1d presents for a routine prenatal appointment.     Denies cramping/contractions, vaginal bleeding, discharge or leakage of fluid. Report +fetal movement.  No HA, vision changes, ruq/epigastric pain.      Patient concerns:  Feeling well overall.   1) Patient states she developed an external hemorrhoid last week. States that it is about the size of a grape. Had a lot of pain but feels that pain is improving. She states she noticed some bleeding yesterday, mostly on the tissue when wiping. No blood in the stool. Reports that she has been somewhat constipated recently. States that she has had some relief with Epsom Salt Baths, Sitz Baths, Witch Hazel, Preparation H Cream and Tylenol. She also has been trying to eat foods high in fiber.   2) Patient feels her heartburn has improved with lifestyle measures and TUMS.     Had u/s on  for FH > dates. EFW 93%tile.    Objective:  Vitals:    18 1359   BP: 117/78   BP Location: Left arm   Patient Position: Chair   Pulse: 87   Weight: 90.4 kg (199 lb 4.8 oz)   Height: 1.626 m (5' 4\")      See OB flowsheet    Rectal Exam: 2 cm external hemorrhoid; mildly tender to palpation    Does not appear to be thrombosed    Assessment/Plan     Encounter Diagnoses   Name Primary?     High-risk pregnancy, elderly multigravida, , WHS CNM Yes     External hemorrhoids      Orders Placed This Encounter   Medications     hydrocortisone (ANUSOL-HC) 2.5 % cream     Sig: Place rectally 2 times daily as needed for hemorrhoids     Dispense:  30 g     Refill:  0     - Labor signs discussed. Reinforced daily fetal movement counts.  - Reviewed why/how to contact provider if headache/visual changes/RUQ or epigastric pain, decreased fetal movement, vaginal bleeding, leakage of fluid.    - Recommended to continue at-home relief measures for external hemorrhoids.   Prescription sent for Anusol 2.5% cream.  2% and 4% lidocaine cream given.     - " "Reviewed ultrasound. Discussed no recommendation for IOL with lga. Reviewed c/s could be recommended if EFW >5000gm.   Further u/s only if indicated.    - GBS and CBC with plts at 36 weeks.     Continue scheduled prenatal care, RTC in 2 weeks and prn if questions or concerns.     I, Maris BLAIR-S2 am serving as a scribe to document services personally performed by CNM based on the provider's statements to me.\" - Maris SOLERS2     The encounter was performed by me and scribed by the PA . The scribed note accurately reflects my personal services and decisions made by me.     ESTEE Heller, KARO      "

## 2018-07-18 ENCOUNTER — OFFICE VISIT (OUTPATIENT)
Dept: INTERNAL MEDICINE | Facility: CLINIC | Age: 38
End: 2018-07-18
Payer: COMMERCIAL

## 2018-07-18 VITALS
OXYGEN SATURATION: 96 % | DIASTOLIC BLOOD PRESSURE: 70 MMHG | SYSTOLIC BLOOD PRESSURE: 106 MMHG | WEIGHT: 199.1 LBS | BODY MASS INDEX: 34.18 KG/M2 | TEMPERATURE: 98.2 F | HEART RATE: 110 BPM

## 2018-07-18 DIAGNOSIS — R09.81 CONGESTION OF PARANASAL SINUS: ICD-10-CM

## 2018-07-18 DIAGNOSIS — J06.9 UPPER RESPIRATORY TRACT INFECTION, UNSPECIFIED TYPE: Primary | ICD-10-CM

## 2018-07-18 DIAGNOSIS — R07.0 THROAT PAIN: ICD-10-CM

## 2018-07-18 LAB
DEPRECATED S PYO AG THROAT QL EIA: NORMAL
SPECIMEN SOURCE: NORMAL

## 2018-07-18 ASSESSMENT — PAIN SCALES - GENERAL: PAINLEVEL: MILD PAIN (3)

## 2018-07-18 NOTE — MR AVS SNAPSHOT
After Visit Summary   7/18/2018    Patricia Levi    MRN: [id number]           Patient Information     Date Of Birth          1980        Visit Information        Provider Department      7/18/2018 8:00 AM Syd Kathleen MD [facility]        Today's Diagnoses     Upper respiratory tract infection, unspecified type    -  1    Congestion of paranasal sinus        Throat pain           Follow-ups after your visit        Your next 10 appointments already scheduled     Jul 25, 2018 10:45 AM CDT   RETURN OB with ESTEE Hurtado CNM   [facility] ([facility])    [facility] [address]  [address]  [address]  [facility] [address] [phone]              Who to contact     Please call your clinic at [phone] to:    Ask questions about your health    Make or cancel appointments    Discuss your medicines    Learn about your test results    Speak to your doctor            Additional Information About Your Visit        MyChart Information     Zairge gives you secure access to your electronic health record. If you see a primary care provider, you can also send messages to your care team and make appointments. If you have questions, please call your primary care clinic.  If you do not have a primary care provider, please call [phone] and they will assist you.      Zairge is an electronic gateway that provides easy, online access to your medical records. With Zairge, you can request a clinic appointment, read your test results, renew a prescription or communicate with your care team.     To access your existing account, please contact your [facility] or call [phone] for assistance.        Care EveryWhere ID     This is your Care EveryWhere ID. This could be used by other organizations to access your [facility] medical records  [id number]        Your Vitals Were     Pulse  Temperature Last Period Pulse Oximetry BMI (Body Mass Index)       110 98.2  F (36.8  C) (Oral) 11/14/2017 96% 34.18 kg/m2        Blood Pressure from Last 3 Encounters:   07/18/18 106/70   07/11/18 117/78   06/22/18 119/79    Weight from Last 3 Encounters:   07/18/18 90.3 kg (199 lb 1.6 oz)   07/11/18 90.4 kg (199 lb 4.8 oz)   06/22/18 88.3 kg (194 lb 9.6 oz)              We Performed the Following     Beta strep group A culture     Rapid strep screen with reflex to culture        Primary Care Provider Office Phone # Fax #    Isac Fatima [phone]       [address] [facility] [address]        Equal Access to Services     [facility] : Hadii ethan mora Sodiane, waaxda luqadaha, qaybta kaalmada jennifer, gianfranco macdonald . So [facility] [phone].    ATENCIÓN: Si habla español, tiene a lin disposición servicios gratuitos de asistencia lingüística. [facility] [phone].    We comply with applicable federal civil rights laws and [place] laws. We do not discriminate on the basis of race, color, national origin, age, disability, sex, sexual orientation, or gender identity.            Thank you!     Thank you for choosing [facility]  for your care. Our goal is always to provide you with excellent care. Hearing back from our patients is one way we can continue to improve our services. Please take a few minutes to complete the written survey that you may receive in the mail after your visit with us. Thank you!             Your Updated Medication List - Protect others around you: Learn how to safely use, store and throw away your medicines at www.disposemymeds.org.          This list is accurate as of 7/18/18  6:34 PM.  Always use your most recent med list.                   Brand Name Dispense Instructions for use Diagnosis    clindamycin 1 % lotion    CLEOCIN T     Apply topically 2 times daily        fluticasone 50 MCG/ACT spray    FLONASE     1 Package    Spray 2 sprays into both nostrils daily.    Allergic rhinitis, cause unspecified       hydrocortisone 2.5 % cream    ANUSOL-HC    30 g    Place rectally 2 times daily as needed for hemorrhoids    High-risk pregnancy, elderly multigravida, unspecified trimester, External hemorrhoids       loratadine 10 MG tablet    CLARITIN    90 tablet    Take 1 tablet by mouth daily.    Allergic rhinitis, cause unspecified       prenatal multivitamin plus iron 27-0.8 MG Tabs per tablet      Take 1 tablet by mouth daily        vitamin D 400 units capsule      Take 1 capsule by mouth daily.

## 2018-07-18 NOTE — NURSING NOTE
"Chief Complaint   Patient presents with     Cough     Pharyngitis     Worse at night.     Eye Problem     \" Goopy eyes \" in the morning.        Mary Lux LPN at 8:14 AM on July 18, 2018  "

## 2018-07-18 NOTE — PROGRESS NOTES
"SUBJECTIVE: Chief complaint: \"Cold symptoms in the setting of pregnancy\".  This 37-year-old woman who is 35 weeks pregnant reports a one-week history of \"cold symptoms\".  She reports that she initially noted a sore throat of gradual onset, followed by the development of sinus congestion with pressure, intermittent cough, occasionally productive of tan sputum, nonpurulent otorrhea, and bilateral ear discomfort, without sinus pain, sinus or ear drainage, fever, or chest discomfort.  She notes mild dyspnea but is not certain that this differs from the baseline she has experienced with her advanced pregnancy.    Past Medical History: Reviewed and updated in patient health profile.     Adverse Drug Reactions: Reviewed and updated in patient health profile.     Current Medications: Reviewed and updated in patient health profile.     OBJECTIVE:     Vital signs: Reviewed in patient health profile.  General: Alert, neatly dressed and groomed, in no acute distress.  HEENT: Atraumatic and normocephalic. Eyelids, pupils, and conjunctivae appeared normal. Lips, teeth and gums appeared normal. Oropharynx showed moist mucous membranes, without exudate or erythema; pharynx swabbed bilaterally and submitted for strep screen and culture; no tenderness on palpation of the frontal or maxillary sinus regions.  Examination of ears showed no external canal exudate or cerumen; TMs were clear bilaterally, without evidence of erythema or effusion.  Neck: Supple, without thyromegaly, mass, or bruit. No cervical or supraclavicular lymphadenopathy.  Back: No spinal or costovertebral angle tenderness.  Chest: Clear to auscultation and percussion. Normal respiratory effort.  Cardiovascular: No jugular venous distention. Regular rate and rhythm, normal S1, S2 without murmur.  Abdomen: Lower abdominal protrusion reflecting advanced pregnancy Bowel sounds positive; soft, nontender, without rebound or guarding.  Extremities: No cyanosis or " edema.    Rapid strep screen negative.     ASSESSMENT:     URI with sinus congestion.  Presumed viral process, with significant discomfort related to sinus congestion and associated ear discomfort.  I recommended nasal saline lavage, using 2 cups of sterile water mixed with 1 teaspoon of salt, mixed and inhaled through nostrils twice daily.  She will use acetaminophen, 325-650 mg up to 3 times a day, which she states has been approved by her obstetric team.  She agrees to call or return in the event of fever, worsening cough, purulent sputum production, dyspnea, facial pain, or if symptoms have not subsided over the next week.     PLAN:  See above.    Total time was 15 minutes.  Counseling time was 10 minutes.  We discussed potential causes of her symptoms, appropriate use of nasal saline lavage and the importance of using only sterile water, symptoms for which to monitor, and plans for further evaluation, treatment, and follow-up.     Please note that the above medical document was created with use of speech recognition software and may contain typographical errors.

## 2018-07-20 LAB
BACTERIA SPEC CULT: NORMAL
Lab: NORMAL
SPECIMEN SOURCE: NORMAL

## 2018-07-25 ENCOUNTER — OFFICE VISIT (OUTPATIENT)
Dept: OBGYN | Facility: CLINIC | Age: 38
End: 2018-07-25
Attending: ADVANCED PRACTICE MIDWIFE
Payer: COMMERCIAL

## 2018-07-25 VITALS
HEART RATE: 86 BPM | HEIGHT: 64 IN | BODY MASS INDEX: 34.45 KG/M2 | SYSTOLIC BLOOD PRESSURE: 125 MMHG | DIASTOLIC BLOOD PRESSURE: 69 MMHG | WEIGHT: 201.8 LBS

## 2018-07-25 DIAGNOSIS — O09.529 HIGH-RISK PREGNANCY, ELDERLY MULTIGRAVIDA, UNSPECIFIED TRIMESTER: Primary | ICD-10-CM

## 2018-07-25 DIAGNOSIS — O92.70 LACTATION PROBLEM: ICD-10-CM

## 2018-07-25 PROCEDURE — 87186 SC STD MICRODIL/AGAR DIL: CPT | Performed by: ADVANCED PRACTICE MIDWIFE

## 2018-07-25 PROCEDURE — 87653 STREP B DNA AMP PROBE: CPT | Performed by: ADVANCED PRACTICE MIDWIFE

## 2018-07-25 PROCEDURE — G0463 HOSPITAL OUTPT CLINIC VISIT: HCPCS | Mod: ZF

## 2018-07-25 RX ORDER — BREAST PUMP
1 EACH MISCELLANEOUS ONCE
Qty: 1 EACH | Refills: 0 | Status: SHIPPED | OUTPATIENT
Start: 2018-07-25 | End: 2018-07-25

## 2018-07-25 NOTE — PROGRESS NOTES
"Subjective:      37 year old  at 36w1d presents for a routine prenatal appointment with her supportive sister (who will attend her birth if able).         No vaginal bleeding,  leakage of fluid, or change in vaginal discharge.  No contractions.  Normal daily fetal movement.     No HA, visual changes, RUQ or epigastric pain.   Patient concerns:  Feeling well overall. Requests breast pump rx.   Objective:  Vitals:    18 1046   BP: 125/69   Pulse: 86   Weight: 91.5 kg (201 lb 12.8 oz)   Height: 1.626 m (5' 4.02\")    See OB flowsheet    Assessment/Plan     Encounter Diagnoses   Name Primary?     High-risk pregnancy, elderly multigravida, , WHS CNM Yes     Lactation problem      Orders Placed This Encounter   Procedures     CBC with Platelets Differential     Orders Placed This Encounter   Medications     Misc. Devices (BREAST PUMP) MISC     Si each once for 1 dose     Dispense:  1 each     Refill:  0       PHQ-9 SCORE 2018   Total Score 0       GBS screening: Obtained.  Labor signs discussed. Reinforced daily fetal movement counts.  Reviewed why/how to contact provider if headache/visual changes/RUQ or epigastric pain, decreased fetal movement, vaginal bleeding, leakage of fluid.  Return to clinic in 1 week and prn if questions or concerns.     ESTEE Brewster CNM    "

## 2018-07-25 NOTE — MR AVS SNAPSHOT
"              After Visit Summary   2018    Patricia Levi    MRN: [id number]           Patient Information     Date Of Birth          1980        Visit Information        Provider Department      2018 10:45 AM Jennifer Patel APRN CNM [facility]        Today's Diagnoses     High-risk pregnancy, elderly multigravida, , S CNM    -  1    Lactation problem           Follow-ups after your visit        Follow-up notes from your care team     Return in about 1 week (around 2018) for Return OB.      Who to contact     Please call your clinic at [phone] to:    Ask questions about your health    Make or cancel appointments    Discuss your medicines    Learn about your test results    Speak to your doctor            Additional Information About Your Visit        MyChart Information     [employer] [facility] gives you secure access to your electronic health record. If you see a primary care provider, you can also send messages to your care team and make appointments. If you have questions, please call your primary care clinic.  If you do not have a primary care provider, please call [phone] and they will assist you.      [facility] is an electronic gateway that provides easy, online access to your medical records. With [facility], you can request a clinic appointment, read your test results, renew a prescription or communicate with your care team.     To access your existing account, please contact your [facility] or call [phone] for assistance.        Care EveryWhere ID     This is your Care EveryWhere ID. This could be used by other organizations to access your [facility] medical records  [id number]        Your Vitals Were     Pulse Height Last Period BMI (Body Mass Index)          86 1.626 m (5' 4.02\") 2017 34.62 kg/m2         Blood Pressure from Last 3 Encounters:   18 125/69   18 106/70   18 117/78    Weight from Last " 3 Encounters:   07/25/18 91.5 kg (201 lb 12.8 oz)   07/18/18 90.3 kg (199 lb 1.6 oz)   07/11/18 90.4 kg (199 lb 4.8 oz)              We Performed the Following     CBC with Platelets Differential     Group B strep PCR          Today's Medication Changes          These changes are accurate as of 7/25/18 12:46 PM.  If you have any questions, ask your nurse or doctor.               Start taking these medicines.        Dose/Directions    breast pump Misc   Used for:  Lactation problem   Started by:  Jennifer Patel APRN CNM        Dose:  1 each   1 each once for 1 dose   Quantity:  1 each   Refills:  0            Where to get your medicines      Some of these will need a paper prescription and others can be bought over the counter.  Ask your nurse if you have questions.     Bring a paper prescription for each of these medications     breast pump Misc                Primary Care Provider Office Phone # Fax #    Isac Fatima [phone]       [address] [facility] [address]        Equal Access to Services     [facility] : Hadii ethan quiroz hadasho Soomaali, waaxda luqadaha, qaybta kaalmada adeegyamattie, gianfranco macdonald . So [facility] [phone].    ATENCIÓN: Si habla español, tiene a lin disposición servicios gratuitos de asistencia lingüística. Llame al [phone].    We comply with applicable federal civil rights laws and [place] laws. We do not discriminate on the basis of race, color, national origin, age, disability, sex, sexual orientation, or gender identity.            Thank you!     Thank you for choosing [facility]  for your care. Our goal is always to provide you with excellent care. Hearing back from our patients is one way we can continue to improve our services. Please take a few minutes to complete the written survey that you may receive in the mail after your visit with us. Thank you!             Your Updated Medication List -  Protect others around you: Learn how to safely use, store and throw away your medicines at www.disposemymeds.org.          This list is accurate as of 7/25/18 12:46 PM.  Always use your most recent med list.                   Brand Name Dispense Instructions for use Diagnosis    breast pump Misc     1 each    1 each once for 1 dose    Lactation problem       clindamycin 1 % lotion    CLEOCIN T     Apply topically 2 times daily        fluticasone 50 MCG/ACT spray    FLONASE    1 Package    Spray 2 sprays into both nostrils daily.    Allergic rhinitis, cause unspecified       hydrocortisone 2.5 % cream    ANUSOL-HC    30 g    Place rectally 2 times daily as needed for hemorrhoids    High-risk pregnancy, elderly multigravida, unspecified trimester, External hemorrhoids       loratadine 10 MG tablet    CLARITIN    90 tablet    Take 1 tablet by mouth daily.    Allergic rhinitis, cause unspecified       prenatal multivitamin plus iron 27-0.8 MG Tabs per tablet      Take 1 tablet by mouth daily        vitamin D 400 units capsule      Take 1 capsule by mouth daily.

## 2018-07-25 NOTE — LETTER
"2018       RE: Patricia Levi  [address] [place] [address]     Dear Colleague,    Thank you for referring your patient, Patricia Levi, to the [facility] at [facility]. Please see a copy of my visit note below.    Subjective:      37 year old  at 36w1d presents for a routine prenatal appointment with her supportive sister (who will attend her birth if able).         No vaginal bleeding,  leakage of fluid, or change in vaginal discharge.  No contractions.  Normal daily fetal movement.     No HA, visual changes, RUQ or epigastric pain.   Patient concerns:  Feeling well overall. Requests breast pump rx.   Objective:  Vitals:    18 1046   BP: 125/69   Pulse: 86   Weight: 91.5 kg (201 lb 12.8 oz)   Height: 1.626 m (5' 4.02\")    See OB flowsheet    Assessment/Plan     Encounter Diagnoses   Name Primary?     High-risk pregnancy, elderly multigravida, , WHS CNM Yes     Lactation problem      Orders Placed This Encounter   Procedures     CBC with Platelets Differential     Orders Placed This Encounter   Medications     Misc. Devices (BREAST PUMP) MISC     Si each once for 1 dose     Dispense:  1 each     Refill:  0       PHQ-9 SCORE 2018   Total Score 0       GBS screening: Obtained.  Labor signs discussed. Reinforced daily fetal movement counts.  Reviewed why/how to contact provider if headache/visual changes/RUQ or epigastric pain, decreased fetal movement, vaginal bleeding, leakage of fluid.  Return to clinic in 1 week and prn if questions or concerns.       Again, thank you for allowing me to participate in the care of your patient.      Sincerely,    ESTEE Brewster CNM      "

## 2018-07-26 DIAGNOSIS — O09.529 HIGH-RISK PREGNANCY, ELDERLY MULTIGRAVIDA, UNSPECIFIED TRIMESTER: ICD-10-CM

## 2018-07-26 LAB
BASOPHILS # BLD AUTO: 0 10E9/L (ref 0–0.2)
BASOPHILS NFR BLD AUTO: 0.2 %
DIFFERENTIAL METHOD BLD: ABNORMAL
EOSINOPHIL # BLD AUTO: 0.2 10E9/L (ref 0–0.7)
EOSINOPHIL NFR BLD AUTO: 2 %
ERYTHROCYTE [DISTWIDTH] IN BLOOD BY AUTOMATED COUNT: 12.6 % (ref 10–15)
GP B STREP DNA SPEC QL NAA+PROBE: POSITIVE
HCT VFR BLD AUTO: 37.6 % (ref 35–47)
HGB BLD-MCNC: 12.5 G/DL (ref 11.7–15.7)
IMM GRANULOCYTES # BLD: 0.1 10E9/L (ref 0–0.4)
IMM GRANULOCYTES NFR BLD: 0.6 %
LYMPHOCYTES # BLD AUTO: 2.1 10E9/L (ref 0.8–5.3)
LYMPHOCYTES NFR BLD AUTO: 18.6 %
MCH RBC QN AUTO: 29.5 PG (ref 26.5–33)
MCHC RBC AUTO-ENTMCNC: 33.2 G/DL (ref 31.5–36.5)
MCV RBC AUTO: 89 FL (ref 78–100)
MONOCYTES # BLD AUTO: 0.7 10E9/L (ref 0–1.3)
MONOCYTES NFR BLD AUTO: 6.2 %
NEUTROPHILS # BLD AUTO: 8.3 10E9/L (ref 1.6–8.3)
NEUTROPHILS NFR BLD AUTO: 72.4 %
NRBC # BLD AUTO: 0 10*3/UL
NRBC BLD AUTO-RTO: 0 /100
PLATELET # BLD AUTO: 334 10E9/L (ref 150–450)
RBC # BLD AUTO: 4.24 10E12/L (ref 3.8–5.2)
SPECIMEN SOURCE: ABNORMAL
WBC # BLD AUTO: 11.5 10E9/L (ref 4–11)

## 2018-07-26 PROCEDURE — 36415 COLL VENOUS BLD VENIPUNCTURE: CPT | Performed by: ADVANCED PRACTICE MIDWIFE

## 2018-07-26 PROCEDURE — 85025 COMPLETE CBC W/AUTO DIFF WBC: CPT | Performed by: ADVANCED PRACTICE MIDWIFE

## 2018-07-27 PROBLEM — B95.1 POSITIVE GBS TEST: Status: ACTIVE | Noted: 2018-07-27

## 2018-07-29 LAB
BACTERIA SPEC CULT: ABNORMAL
SPECIMEN SOURCE: ABNORMAL

## 2018-08-02 ENCOUNTER — OFFICE VISIT (OUTPATIENT)
Dept: OBGYN | Facility: CLINIC | Age: 38
End: 2018-08-02
Attending: MIDWIFE
Payer: COMMERCIAL

## 2018-08-02 VITALS
WEIGHT: 201 LBS | HEIGHT: 64 IN | SYSTOLIC BLOOD PRESSURE: 119 MMHG | BODY MASS INDEX: 34.31 KG/M2 | DIASTOLIC BLOOD PRESSURE: 77 MMHG | HEART RATE: 102 BPM

## 2018-08-02 DIAGNOSIS — O09.529 HIGH-RISK PREGNANCY, ELDERLY MULTIGRAVIDA, UNSPECIFIED TRIMESTER: Primary | ICD-10-CM

## 2018-08-02 PROBLEM — E55.9 VITAMIN D DEFICIENCY: Status: RESOLVED | Noted: 2018-01-18 | Resolved: 2018-08-02

## 2018-08-02 PROCEDURE — G0463 HOSPITAL OUTPT CLINIC VISIT: HCPCS | Mod: ZF

## 2018-08-02 ASSESSMENT — PAIN SCALES - GENERAL: PAINLEVEL: NO PAIN (0)

## 2018-08-02 NOTE — MR AVS SNAPSHOT
After Visit Summary   2018    Patricia Levi    MRN: [id number]           Patient Information     Date Of Birth          1980        Visit Information        Provider Department      2018 10:15 AM Esperanza Wadsworth APRN CNM [facility]        Today's Diagnoses     High-risk pregnancy, elderly multigravida, , S CNM    -  1       Follow-ups after your visit        Follow-up notes from your care team     Return in about 1 week (around 2018) for ELPIDIO.      Your next 10 appointments already scheduled     Aug 09, 2018  9:15 AM CDT   RETURN OB with ESTEE Thrasher CNM   [facility] ([facility])    [facility] [address]  [address]  [facility] [address] [phone]              Who to contact     Please call your clinic at [phone] to:    Ask questions about your health    Make or cancel appointments    Discuss your medicines    Learn about your test results    Speak to your doctor            Additional Information About Your Visit        MyChart Information     [employer] gives you secure access to your electronic health record. If you see a primary care provider, you can also send messages to your care team and make appointments. If you have questions, please call your primary care clinic.  If you do not have a primary care provider, please call [phone] and they will assist you.      [employer] is an electronic gateway that provides easy, online access to your medical records. With [employer], you can request a clinic appointment, read your test results, renew a prescription or communicate with your care team.     To access your existing account, please contact your [facility] or call [phone] for assistance.        Care EveryWhere ID     This is your Care EveryWhere ID. This could be used by other organizations to access your [facility]  "records  [id number]        Your Vitals Were     Pulse Height Last Period BMI (Body Mass Index)          102 1.626 m (5' 4\") 11/14/2017 34.5 kg/m2         Blood Pressure from Last 3 Encounters:   08/02/18 119/77   07/25/18 125/69   07/18/18 106/70    Weight from Last 3 Encounters:   08/02/18 91.2 kg (201 lb)   07/25/18 91.5 kg (201 lb 12.8 oz)   07/18/18 90.3 kg (199 lb 1.6 oz)              Today, you had the following     No orders found for display       Primary Care Provider Office Phone # Fax #    Isac Fatima [phone]       [address] [facility] [address]        Equal Access to Services     [facility]: Valentino mora Sodiane, waaxda luqadaha, qaybta kaalmada jennifer, gianfranco macdonald . So [facility] [phone].    ATENCIÓN: Si habla español, tiene a lin disposición servicios gratuitos de asistencia lingüística. Jennifer al [phone].    We comply with applicable federal civil rights laws and [place] laws. We do not discriminate on the basis of race, color, national origin, age, disability, sex, sexual orientation, or gender identity.            Thank you!     Thank you for choosing [facility]  for your care. Our goal is always to provide you with excellent care. Hearing back from our patients is one way we can continue to improve our services. Please take a few minutes to complete the written survey that you may receive in the mail after your visit with us. Thank you!             Your Updated Medication List - Protect others around you: Learn how to safely use, store and throw away your medicines at www.disposemymeds.org.          This list is accurate as of 8/2/18  1:17 PM.  Always use your most recent med list.                   Brand Name Dispense Instructions for use Diagnosis    hydrocortisone 2.5 % cream    ANUSOL-HC    30 g    Place rectally 2 times daily as needed for hemorrhoids    High-risk pregnancy, elderly " multigravida, unspecified trimester, External hemorrhoids       loratadine 10 MG tablet    CLARITIN    90 tablet    Take 1 tablet by mouth daily.    Allergic rhinitis, cause unspecified       prenatal multivitamin plus iron 27-0.8 MG Tabs per tablet      Take 1 tablet by mouth daily        vitamin D 400 units capsule      Take 1 capsule by mouth daily.

## 2018-08-02 NOTE — LETTER
"2018       RE: Patricia Levi  [address] [place] [address]     Dear Colleague,    Thank you for referring your patient, Patricia Levi, to the [facility] at [facility]. Please see a copy of my visit note below.    Subjective:      37 year old  at 37w2d presents for a routine prenatal appointment.         no vaginal bleeding,  leakage of fluid, or change in vaginal discharge.  occ  contractions.  Good  fetal movement.     No HA, visual changes, RUQ or epigastric pain.   Patient concerns: would like to consider elective IOL 39-41w   Feeling well overall.   Objective:  Vitals:    18 1025   BP: 130/79   Pulse: 130   Weight: 91.2 kg (201 lb)   Height: 1.626 m (5' 4\")    See OB flowsheet    Assessment/Plan     Encounter Diagnosis   Name Primary?     High-risk pregnancy, elderly multigravida, , WHS CNM Yes       Supervision of other high risk pregnancy   PHQ-9 SCORE 2018   Total Score 0     [unfilled]  GBS screening: Obtained.+ GBS reviewed   Birth preferences reviewed: Medicated  Labor signs discussed. Reinforced daily fetal movement counts.  Reviewed why/how to contact provider if headache/visual changes/RUQ or epigastric pain, decreased fetal movement, vaginal bleeding, leakage of fluid.   Return to clinic in 1 week and prn if questions or concerns.     Esperanza Wadsworth, ESTEE CNM    "

## 2018-08-02 NOTE — PROGRESS NOTES
"Subjective:      37 year old  at 37w2d presents for a routine prenatal appointment.         no vaginal bleeding,  leakage of fluid, or change in vaginal discharge.  occ  contractions.  Good  fetal movement.     No HA, visual changes, RUQ or epigastric pain.   Patient concerns: would like to consider elective IOL 39-41w   Feeling well overall.   Objective:  Vitals:    18 1025   BP: 130/79   Pulse: 130   Weight: 91.2 kg (201 lb)   Height: 1.626 m (5' 4\")    See OB flowsheet    Assessment/Plan     Encounter Diagnosis   Name Primary?     High-risk pregnancy, elderly multigravida, , WHS CNM Yes       Supervision of other high risk pregnancy   PHQ-9 SCORE 2018   Total Score 0     [unfilled]  GBS screening: Obtained.+ GBS reviewed   Birth preferences reviewed: Medicated  Labor signs discussed. Reinforced daily fetal movement counts.  Reviewed why/how to contact provider if headache/visual changes/RUQ or epigastric pain, decreased fetal movement, vaginal bleeding, leakage of fluid.   Return to clinic in 1 week and prn if questions or concerns.     ESTEE Alexander CNM  "

## 2018-08-09 ENCOUNTER — OFFICE VISIT (OUTPATIENT)
Dept: OBGYN | Facility: CLINIC | Age: 38
End: 2018-08-09
Attending: ADVANCED PRACTICE MIDWIFE
Payer: COMMERCIAL

## 2018-08-09 VITALS
HEIGHT: 64 IN | BODY MASS INDEX: 34.5 KG/M2 | DIASTOLIC BLOOD PRESSURE: 80 MMHG | SYSTOLIC BLOOD PRESSURE: 119 MMHG | WEIGHT: 202.1 LBS | HEART RATE: 84 BPM

## 2018-08-09 DIAGNOSIS — O09.529 HIGH-RISK PREGNANCY, ELDERLY MULTIGRAVIDA, UNSPECIFIED TRIMESTER: Primary | ICD-10-CM

## 2018-08-09 PROCEDURE — G0463 HOSPITAL OUTPT CLINIC VISIT: HCPCS | Mod: ZF

## 2018-08-09 NOTE — PROGRESS NOTES
"Subjective:     37 year old  at 38w2d presents for routine prenatal visit.   No vaginal bleeding or leakage of fluid.  No contractions.  Positive fetal movement.       No HA, visual changes, RUQ or epigastric pain.   Patient concerns: Feels well overall. Considering membrane sweeping with next visit. Desires cervix check today.   EO 3cm soft/IO 2-3 cm/50%/-2 mid EFW 7-#    Objective:  Vitals:    18 0927   BP: 119/80   BP Location: Left arm   Patient Position: Chair   Pulse: 84   Weight: 91.7 kg (202 lb 1.6 oz)   Height: 1.626 m (5' 4\")    See OB flowsheet  Assessment/Plan     Encounter Diagnosis   Name Primary?     High-risk pregnancy, elderly multigravida, , JORGE A HUDDLESTON Yes       - Reviewed why/how to contact provider if headache/visual changes/RUQ or epigastric pain, decreased fetal movement, vaginal bleeding, leakage of fluid or strong/regular contractions.   Patient education/orders or handouts today:  Sign/symptoms of labor, When to call for labor or other concerns and Induction of labor. Unsure if desires elective IOL at 39-40. Desires membrane sweeping next visit.   Return to clinic in 1 week and prn if questions or concerns.   ESTEE ThrasherM    "

## 2018-08-09 NOTE — LETTER
"2018       RE: Patricia Levi  [address] [place] [address]     Dear Colleague,    Thank you for referring your patient, Patricia Levi, to the [facility] at [facility]. Please see a copy of my visit note below.    Subjective:     37 year old  at 38w2d presents for routine prenatal visit.   No vaginal bleeding or leakage of fluid.  No contractions.  Positive fetal movement.       No HA, visual changes, RUQ or epigastric pain.   Patient concerns: Feels well overall. Considering membrane sweeping with next visit. Desires cervix check today.   EO 3cm soft/IO 2-3 cm/50%/-2 mid EFW 7-#    Objective:  Vitals:    18 0927   BP: 119/80   BP Location: Left arm   Patient Position: Chair   Pulse: 84   Weight: 91.7 kg (202 lb 1.6 oz)   Height: 1.626 m (5' 4\")    See OB flowsheet  Assessment/Plan     Encounter Diagnosis   Name Primary?     High-risk pregnancy, elderly multigravida, , WHS CNM Yes       - Reviewed why/how to contact provider if headache/visual changes/RUQ or epigastric pain, decreased fetal movement, vaginal bleeding, leakage of fluid or strong/regular contractions.   Patient education/orders or handouts today:  Sign/symptoms of labor, When to call for labor or other concerns and Induction of labor. Unsure if desires elective IOL at 39-40. Desires membrane sweeping next visit.   Return to clinic in 1 week and prn if questions or concerns.       Again, thank you for allowing me to participate in the care of your patient.      Sincerely,    ESTEE Thrasher CNM      "

## 2018-08-09 NOTE — MR AVS SNAPSHOT
After Visit Summary   2018    Patricia Levi    MRN: [id number]           Patient Information     Date Of Birth          1980        Visit Information        Provider Department      2018 9:15 AM Rebecca Ellis APRN CNM [facility]        Today's Diagnoses     High-risk pregnancy, elderly multigravida, , S CNM    -  1       Follow-ups after your visit        Follow-up notes from your care team     Return in about 1 week (around 2018).      Your next 10 appointments already scheduled     Aug 15, 2018  9:30 AM CDT   RETURN OB with ESTEE Hurtado CNM   [facility] ([facility])    [facility] [address]  [address]  [facility] [address] [phone]              Who to contact     Please call your clinic at [phone] to:    Ask questions about your health    Make or cancel appointments    Discuss your medicines    Learn about your test results    Speak to your doctor            Additional Information About Your Visit        MyChart Information     [employer] gives you secure access to your electronic health record. If you see a primary care provider, you can also send messages to your care team and make appointments. If you have questions, please call your primary care clinic.  If you do not have a primary care provider, please call [phone] and they will assist you.      [employer] is an electronic gateway that provides easy, online access to your medical records. With [employer], you can request a clinic appointment, read your test results, renew a prescription or communicate with your care team.     To access your existing account, please contact your [facility] or call [phone] for assistance.        Care EveryWhere ID     This is your Care EveryWhere ID. This could be used by other organizations to access your [facility]  "records  [id number]        Your Vitals Were     Pulse Height Last Period BMI (Body Mass Index)          84 1.626 m (5' 4\") 11/14/2017 34.69 kg/m2         Blood Pressure from Last 3 Encounters:   08/09/18 119/80   08/02/18 119/77   07/25/18 125/69    Weight from Last 3 Encounters:   08/09/18 91.7 kg (202 lb 1.6 oz)   08/02/18 91.2 kg (201 lb)   07/25/18 91.5 kg (201 lb 12.8 oz)              Today, you had the following     No orders found for display       Primary Care Provider Office Phone # Fax #    Isac Fatima [phone]        [facility] [address]        Equal Access to Services     [facility]: Valentino mora Sodiane, waaxda luqadaha, qaybta kaalmada jennifer, gianfranco macdonald . So [facility] [phone].    ATENCIÓN: Si habla español, tiene a lin disposición servicios gratuitos de asistencia lingüística. Jennifer al [phone].    We comply with applicable federal civil rights laws and [place] laws. We do not discriminate on the basis of race, color, national origin, age, disability, sex, sexual orientation, or gender identity.            Thank you!     Thank you for choosing [facility]  for your care. Our goal is always to provide you with excellent care. Hearing back from our patients is one way we can continue to improve our services. Please take a few minutes to complete the written survey that you may receive in the mail after your visit with us. Thank you!             Your Updated Medication List - Protect others around you: Learn how to safely use, store and throw away your medicines at www.disposemymeds.org.          This list is accurate as of 8/9/18 10:03 AM.  Always use your most recent med list.                   Brand Name Dispense Instructions for use Diagnosis    hydrocortisone 2.5 % cream    ANUSOL-HC    30 g    Place rectally 2 times daily as needed for hemorrhoids    High-risk pregnancy, elderly " multigravida, unspecified trimester, External hemorrhoids       loratadine 10 MG tablet    CLARITIN    90 tablet    Take 1 tablet by mouth daily.    Allergic rhinitis, cause unspecified       prenatal multivitamin plus iron 27-0.8 MG Tabs per tablet      Take 1 tablet by mouth daily        vitamin D 400 units capsule      Take 1 capsule by mouth daily.

## 2018-08-15 ENCOUNTER — OFFICE VISIT (OUTPATIENT)
Dept: OBGYN | Facility: CLINIC | Age: 38
End: 2018-08-15
Attending: ADVANCED PRACTICE MIDWIFE
Payer: COMMERCIAL

## 2018-08-15 VITALS
SYSTOLIC BLOOD PRESSURE: 118 MMHG | HEIGHT: 64 IN | BODY MASS INDEX: 34.78 KG/M2 | HEART RATE: 90 BPM | DIASTOLIC BLOOD PRESSURE: 82 MMHG | WEIGHT: 203.7 LBS

## 2018-08-15 DIAGNOSIS — B95.1 POSITIVE GBS TEST: ICD-10-CM

## 2018-08-15 DIAGNOSIS — O09.529 HIGH-RISK PREGNANCY, ELDERLY MULTIGRAVIDA, UNSPECIFIED TRIMESTER: Primary | ICD-10-CM

## 2018-08-15 PROCEDURE — G0463 HOSPITAL OUTPT CLINIC VISIT: HCPCS | Mod: ZF

## 2018-08-15 NOTE — MR AVS SNAPSHOT
After Visit Summary   8/15/2018    Patricia Levi    MRN: [id number]           Patient Information     Date Of Birth          1980        Visit Information        Provider Department      8/15/2018 9:30 AM Jennifer Patel APRN CNM [facility]        Today's Diagnoses     High-risk pregnancy, elderly multigravida, , WHS CNM    -  1    Positive GBS test           Follow-ups after your visit        Follow-up notes from your care team     Return in about 5 days (around 2018) for Return OB.      Your next 10 appointments already scheduled     Aug 21, 2018 11:15 AM CDT   RETURN OB with ESTEE Santana CNM   [facility] ([facility])    [facility] [address]  [address]  [facility] [address] [phone]              Who to contact     Please call your clinic at [phone] to:    Ask questions about your health    Make or cancel appointments    Discuss your medicines    Learn about your test results    Speak to your doctor            Additional Information About Your Visit        MyChart Information     [employer] gives you secure access to your electronic health record. If you see a primary care provider, you can also send messages to your care team and make appointments. If you have questions, please call your primary care clinic.  If you do not have a primary care provider, please call [phone] and they will assist you.      [employer] is an electronic gateway that provides easy, online access to your medical records. With [employer], you can request a clinic appointment, read your test results, renew a prescription or communicate with your care team.     To access your existing account, please contact your [facility] or call [phone] for assistance.        Care EveryWhere ID     This is your Care EveryWhere ID. This could be used by other organizations to access  "your [facility] medical records  [id number]        Your Vitals Were     Pulse Height Last Period BMI (Body Mass Index)          90 1.626 m (5' 4\") 11/14/2017 34.97 kg/m2         Blood Pressure from Last 3 Encounters:   08/15/18 118/82   08/09/18 119/80   08/02/18 119/77    Weight from Last 3 Encounters:   08/15/18 92.4 kg (203 lb 11.2 oz)   08/09/18 91.7 kg (202 lb 1.6 oz)   08/02/18 91.2 kg (201 lb)              Today, you had the following     No orders found for display       Primary Care Provider Office Phone # Fax #    Isac Fatima [phone] [address] [facility] [address]        Equal Access to Services     [facility]: Hadii ethan mora Sodiane, waaxda luqadaha, qaybta kaalmada jennifer, gianfranco macdonald . So [facility] [phone].    ATENCIÓN: Si habla español, tiene a lin disposición servicios gratuitos de asistencia lingüística. Jennifer al [phone].    We comply with applicable federal civil rights laws and [place] laws. We do not discriminate on the basis of race, color, national origin, age, disability, sex, sexual orientation, or gender identity.            Thank you!     Thank you for choosing [facility]  for your care. Our goal is always to provide you with excellent care. Hearing back from our patients is one way we can continue to improve our services. Please take a few minutes to complete the written survey that you may receive in the mail after your visit with us. Thank you!             Your Updated Medication List - Protect others around you: Learn how to safely use, store and throw away your medicines at www.disposemymeds.org.          This list is accurate as of 8/15/18  9:52 AM.  Always use your most recent med list.                   Brand Name Dispense Instructions for use Diagnosis    hydrocortisone 2.5 % cream    ANUSOL-HC    30 g    Place rectally 2 times daily as needed for hemorrhoids    High-risk " pregnancy, elderly multigravida, unspecified trimester, External hemorrhoids       loratadine 10 MG tablet    CLARITIN    90 tablet    Take 1 tablet by mouth daily.    Allergic rhinitis, cause unspecified       prenatal multivitamin plus iron 27-0.8 MG Tabs per tablet      Take 1 tablet by mouth daily        vitamin D 400 units capsule      Take 1 capsule by mouth daily.

## 2018-08-15 NOTE — PROGRESS NOTES
"Subjective:     37 year old  at 39w1d presents for routine prenatal visit.            No vaginal bleeding or leakage of fluid.  Occasional contractions.  Normal daily fetal movement.        No HA, visual changes, RUQ or epigastric pain.   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 If she's unable to make the orientation, she will need to defer for a year. Trying to feel patient about baby's arrival, but hoping to meet baby soon so that she can attend orientation. Does not desire membrane sweep today but would like to make an appt for Monday and may desire sweep at that time.  Feeling well overall.    Objective:  Vitals:    08/15/18 0916   BP: 118/82   BP Location: Left arm   Patient Position: Chair   Pulse: 90   Weight: 92.4 kg (203 lb 11.2 oz)   Height: 1.626 m (5' 4\")    See OB flowsheet  SVE 3/25/-3    Assessment/Plan     Encounter Diagnoses   Name Primary?     High-risk pregnancy, elderly multigravida, , WHS CNM Yes     Positive GBS test      - Discussed r/b of membrane sweep, particularly with GBS positive, including increased r/f prelabor rupture of membrane  - Reviewed why/how to contact provider if headache/visual changes/RUQ or epigastric pain, decreased fetal movement, vaginal bleeding, leakage of fluid or strong/regular contractions.  Return to clinic in 1 week and prn if questions or concerns.     ESTEE Brewster CNM      "

## 2018-08-15 NOTE — LETTER
"8/15/2018       RE: Patricia Levi  [address] [place] [address]     Dear Colleague,    Thank you for referring your patient, Patricia Levi, to the [facility] at [facility]. Please see a copy of my visit note below.    Subjective:     37 year old  at 39w1d presents for routine prenatal visit.            No vaginal bleeding or leakage of fluid.  Occasional contractions.  Normal daily fetal movement.        No HA, visual changes, RUQ or epigastric pain.   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 If she's unable to make the orientation, she will need to defer for a year. Trying to feel patient about baby's arrival, but hoping to meet baby soon so that she can attend orientation. Does not desire membrane sweep today but would like to make an appt for Monday and may desire sweep at that time.  Feeling well overall.    Objective:  Vitals:    08/15/18 0916   BP: 118/82   BP Location: Left arm   Patient Position: Chair   Pulse: 90   Weight: 92.4 kg (203 lb 11.2 oz)   Height: 1.626 m (5' 4\")    See OB flowsheet  SVE 3/25/-3    Assessment/Plan     Encounter Diagnoses   Name Primary?     High-risk pregnancy, elderly multigravida, , WHS CNM Yes     Positive GBS test      - Discussed r/b of membrane sweep, particularly with GBS positive, including increased r/f prelabor rupture of membrane  - Reviewed why/how to contact provider if headache/visual changes/RUQ or epigastric pain, decreased fetal movement, vaginal bleeding, leakage of fluid or strong/regular contractions.  Return to clinic in 1 week and prn if questions or concerns.     ESTEE Brewster CNM        "

## 2018-08-18 ENCOUNTER — ANESTHESIA (OUTPATIENT)
Dept: OBGYN | Facility: CLINIC | Age: 38
End: 2018-08-18
Payer: COMMERCIAL

## 2018-08-18 ENCOUNTER — ANESTHESIA EVENT (OUTPATIENT)
Dept: OBGYN | Facility: CLINIC | Age: 38
End: 2018-08-18
Payer: COMMERCIAL

## 2018-08-18 ENCOUNTER — HOSPITAL ENCOUNTER (INPATIENT)
Facility: CLINIC | Age: 38
LOS: 2 days | Discharge: HOME | End: 2018-08-20
Attending: ADVANCED PRACTICE MIDWIFE | Admitting: ADVANCED PRACTICE MIDWIFE
Payer: COMMERCIAL

## 2018-08-18 PROBLEM — Z36.89 ENCOUNTER FOR TRIAGE IN PREGNANT PATIENT: Status: ACTIVE | Noted: 2018-08-18

## 2018-08-18 LAB
ABO + RH BLD: NORMAL
ABO + RH BLD: NORMAL
BASOPHILS # BLD AUTO: 0 10E9/L (ref 0–0.2)
BASOPHILS NFR BLD AUTO: 0.1 %
DIFFERENTIAL METHOD BLD: ABNORMAL
EOSINOPHIL # BLD AUTO: 0.1 10E9/L (ref 0–0.7)
EOSINOPHIL NFR BLD AUTO: 0.9 %
ERYTHROCYTE [DISTWIDTH] IN BLOOD BY AUTOMATED COUNT: 13 % (ref 10–15)
HCT VFR BLD AUTO: 39.3 % (ref 35–47)
HGB BLD-MCNC: 13.4 G/DL (ref 11.7–15.7)
IMM GRANULOCYTES # BLD: 0.1 10E9/L (ref 0–0.4)
IMM GRANULOCYTES NFR BLD: 0.5 %
LYMPHOCYTES # BLD AUTO: 1.7 10E9/L (ref 0.8–5.3)
LYMPHOCYTES NFR BLD AUTO: 11.3 %
MCH RBC QN AUTO: 30.2 PG (ref 26.5–33)
MCHC RBC AUTO-ENTMCNC: 34.1 G/DL (ref 31.5–36.5)
MCV RBC AUTO: 89 FL (ref 78–100)
MONOCYTES # BLD AUTO: 0.9 10E9/L (ref 0–1.3)
MONOCYTES NFR BLD AUTO: 6 %
NEUTROPHILS # BLD AUTO: 12 10E9/L (ref 1.6–8.3)
NEUTROPHILS NFR BLD AUTO: 81.2 %
NRBC # BLD AUTO: 0 10*3/UL
NRBC BLD AUTO-RTO: 0 /100
PLATELET # BLD AUTO: 288 10E9/L (ref 150–450)
RBC # BLD AUTO: 4.43 10E12/L (ref 3.8–5.2)
SPECIMEN EXP DATE BLD: NORMAL
T PALLIDUM AB SER QL: NONREACTIVE
WBC # BLD AUTO: 14.8 10E9/L (ref 4–11)

## 2018-08-18 PROCEDURE — G0463 HOSPITAL OUTPT CLINIC VISIT: HCPCS

## 2018-08-18 PROCEDURE — 86901 BLOOD TYPING SEROLOGIC RH(D): CPT | Performed by: ADVANCED PRACTICE MIDWIFE

## 2018-08-18 PROCEDURE — 85025 COMPLETE CBC W/AUTO DIFF WBC: CPT | Performed by: ADVANCED PRACTICE MIDWIFE

## 2018-08-18 PROCEDURE — 25000125 ZZHC RX 250: Performed by: STUDENT IN AN ORGANIZED HEALTH CARE EDUCATION/TRAINING PROGRAM

## 2018-08-18 PROCEDURE — 25000128 H RX IP 250 OP 636: Performed by: STUDENT IN AN ORGANIZED HEALTH CARE EDUCATION/TRAINING PROGRAM

## 2018-08-18 PROCEDURE — 86900 BLOOD TYPING SEROLOGIC ABO: CPT | Performed by: ADVANCED PRACTICE MIDWIFE

## 2018-08-18 PROCEDURE — 10907ZC DRAINAGE OF AMNIOTIC FLUID, THERAPEUTIC FROM PRODUCTS OF CONCEPTION, VIA NATURAL OR ARTIFICIAL OPENING: ICD-10-PCS | Performed by: ADVANCED PRACTICE MIDWIFE

## 2018-08-18 PROCEDURE — 86780 TREPONEMA PALLIDUM: CPT | Performed by: ADVANCED PRACTICE MIDWIFE

## 2018-08-18 PROCEDURE — 25000128 H RX IP 250 OP 636: Performed by: ADVANCED PRACTICE MIDWIFE

## 2018-08-18 PROCEDURE — 25000132 ZZH RX MED GY IP 250 OP 250 PS 637: Performed by: ADVANCED PRACTICE MIDWIFE

## 2018-08-18 PROCEDURE — 0HQ9XZZ REPAIR PERINEUM SKIN, EXTERNAL APPROACH: ICD-10-PCS | Performed by: ADVANCED PRACTICE MIDWIFE

## 2018-08-18 PROCEDURE — 72200001 ZZH LABOR CARE VAGINAL DELIVERY SINGLE

## 2018-08-18 PROCEDURE — 12000030 ZZH R&B OB INTERMEDIATE UMMC

## 2018-08-18 PROCEDURE — 25000125 ZZHC RX 250

## 2018-08-18 RX ORDER — OXYTOCIN/0.9 % SODIUM CHLORIDE 30/500 ML
100-340 PLASTIC BAG, INJECTION (ML) INTRAVENOUS CONTINUOUS PRN
Status: COMPLETED | OUTPATIENT
Start: 2018-08-18 | End: 2018-08-18

## 2018-08-18 RX ORDER — OXYTOCIN 10 [USP'U]/ML
10 INJECTION, SOLUTION INTRAMUSCULAR; INTRAVENOUS
Status: DISCONTINUED | OUTPATIENT
Start: 2018-08-18 | End: 2018-08-18

## 2018-08-18 RX ORDER — NALBUPHINE HYDROCHLORIDE 10 MG/ML
2.5-5 INJECTION, SOLUTION INTRAMUSCULAR; INTRAVENOUS; SUBCUTANEOUS EVERY 6 HOURS PRN
Status: DISCONTINUED | OUTPATIENT
Start: 2018-08-18 | End: 2018-08-18 | Stop reason: CLARIF

## 2018-08-18 RX ORDER — EPHEDRINE SULFATE 50 MG/ML
5 INJECTION, SOLUTION INTRAMUSCULAR; INTRAVENOUS; SUBCUTANEOUS
Status: DISCONTINUED | OUTPATIENT
Start: 2018-08-18 | End: 2018-08-18 | Stop reason: CLARIF

## 2018-08-18 RX ORDER — NALOXONE HYDROCHLORIDE 0.4 MG/ML
.1-.4 INJECTION, SOLUTION INTRAMUSCULAR; INTRAVENOUS; SUBCUTANEOUS
Status: DISCONTINUED | OUTPATIENT
Start: 2018-08-18 | End: 2018-08-18

## 2018-08-18 RX ORDER — BISACODYL 10 MG
10 SUPPOSITORY, RECTAL RECTAL DAILY PRN
Status: DISCONTINUED | OUTPATIENT
Start: 2018-08-20 | End: 2018-08-20 | Stop reason: HOSPADM

## 2018-08-18 RX ORDER — OXYTOCIN 10 [USP'U]/ML
10 INJECTION, SOLUTION INTRAMUSCULAR; INTRAVENOUS
Status: DISCONTINUED | OUTPATIENT
Start: 2018-08-18 | End: 2018-08-20 | Stop reason: HOSPADM

## 2018-08-18 RX ORDER — SODIUM CHLORIDE, SODIUM LACTATE, POTASSIUM CHLORIDE, CALCIUM CHLORIDE 600; 310; 30; 20 MG/100ML; MG/100ML; MG/100ML; MG/100ML
INJECTION, SOLUTION INTRAVENOUS
Status: DISCONTINUED
Start: 2018-08-18 | End: 2018-08-18 | Stop reason: HOSPADM

## 2018-08-18 RX ORDER — LIDOCAINE HYDROCHLORIDE AND EPINEPHRINE 15; 5 MG/ML; UG/ML
INJECTION, SOLUTION EPIDURAL PRN
Status: DISCONTINUED | OUTPATIENT
Start: 2018-08-18 | End: 2018-08-18 | Stop reason: HOSPADM

## 2018-08-18 RX ORDER — ACETAMINOPHEN 325 MG/1
650 TABLET ORAL EVERY 4 HOURS PRN
Status: DISCONTINUED | OUTPATIENT
Start: 2018-08-18 | End: 2018-08-20 | Stop reason: HOSPADM

## 2018-08-18 RX ORDER — MISOPROSTOL 200 UG/1
TABLET ORAL
Status: DISCONTINUED
Start: 2018-08-18 | End: 2018-08-18 | Stop reason: HOSPADM

## 2018-08-18 RX ORDER — MISOPROSTOL 200 UG/1
400 TABLET ORAL
Status: DISCONTINUED | OUTPATIENT
Start: 2018-08-18 | End: 2018-08-20 | Stop reason: HOSPADM

## 2018-08-18 RX ORDER — CARBOPROST TROMETHAMINE 250 UG/ML
250 INJECTION, SOLUTION INTRAMUSCULAR
Status: DISCONTINUED | OUTPATIENT
Start: 2018-08-18 | End: 2018-08-18

## 2018-08-18 RX ORDER — SODIUM CHLORIDE, SODIUM LACTATE, POTASSIUM CHLORIDE, CALCIUM CHLORIDE 600; 310; 30; 20 MG/100ML; MG/100ML; MG/100ML; MG/100ML
INJECTION, SOLUTION INTRAVENOUS CONTINUOUS
Status: DISCONTINUED | OUTPATIENT
Start: 2018-08-18 | End: 2018-08-18

## 2018-08-18 RX ORDER — LIDOCAINE HYDROCHLORIDE 10 MG/ML
INJECTION, SOLUTION EPIDURAL; INFILTRATION; INTRACAUDAL; PERINEURAL
Status: DISCONTINUED
Start: 2018-08-18 | End: 2018-08-18 | Stop reason: HOSPADM

## 2018-08-18 RX ORDER — AMOXICILLIN 250 MG
2 CAPSULE ORAL 2 TIMES DAILY
Status: DISCONTINUED | OUTPATIENT
Start: 2018-08-18 | End: 2018-08-20 | Stop reason: HOSPADM

## 2018-08-18 RX ORDER — FENTANYL CITRATE 50 UG/ML
50-100 INJECTION, SOLUTION INTRAMUSCULAR; INTRAVENOUS
Status: DISCONTINUED | OUTPATIENT
Start: 2018-08-18 | End: 2018-08-18

## 2018-08-18 RX ORDER — OXYTOCIN/0.9 % SODIUM CHLORIDE 30/500 ML
PLASTIC BAG, INJECTION (ML) INTRAVENOUS
Status: DISCONTINUED
Start: 2018-08-18 | End: 2018-08-18 | Stop reason: HOSPADM

## 2018-08-18 RX ORDER — OXYCODONE AND ACETAMINOPHEN 5; 325 MG/1; MG/1
1 TABLET ORAL
Status: DISCONTINUED | OUTPATIENT
Start: 2018-08-18 | End: 2018-08-18

## 2018-08-18 RX ORDER — NALOXONE HYDROCHLORIDE 0.4 MG/ML
.1-.4 INJECTION, SOLUTION INTRAMUSCULAR; INTRAVENOUS; SUBCUTANEOUS
Status: DISCONTINUED | OUTPATIENT
Start: 2018-08-18 | End: 2018-08-18 | Stop reason: CLARIF

## 2018-08-18 RX ORDER — HYDROCORTISONE 2.5 %
CREAM (GRAM) TOPICAL 3 TIMES DAILY PRN
Status: DISCONTINUED | OUTPATIENT
Start: 2018-08-18 | End: 2018-08-20 | Stop reason: HOSPADM

## 2018-08-18 RX ORDER — IBUPROFEN 800 MG/1
800 TABLET, FILM COATED ORAL
Status: COMPLETED | OUTPATIENT
Start: 2018-08-18 | End: 2018-08-18

## 2018-08-18 RX ORDER — AMOXICILLIN 250 MG
1 CAPSULE ORAL 2 TIMES DAILY
Status: DISCONTINUED | OUTPATIENT
Start: 2018-08-18 | End: 2018-08-20 | Stop reason: HOSPADM

## 2018-08-18 RX ORDER — LANOLIN 100 %
OINTMENT (GRAM) TOPICAL
Status: DISCONTINUED | OUTPATIENT
Start: 2018-08-18 | End: 2018-08-20 | Stop reason: HOSPADM

## 2018-08-18 RX ORDER — OXYTOCIN/0.9 % SODIUM CHLORIDE 30/500 ML
100 PLASTIC BAG, INJECTION (ML) INTRAVENOUS CONTINUOUS
Status: DISCONTINUED | OUTPATIENT
Start: 2018-08-18 | End: 2018-08-20 | Stop reason: HOSPADM

## 2018-08-18 RX ORDER — ONDANSETRON 2 MG/ML
4 INJECTION INTRAMUSCULAR; INTRAVENOUS EVERY 6 HOURS PRN
Status: DISCONTINUED | OUTPATIENT
Start: 2018-08-18 | End: 2018-08-18

## 2018-08-18 RX ORDER — VANCOMYCIN HYDROCHLORIDE 1 G/200ML
1000 INJECTION, SOLUTION INTRAVENOUS EVERY 12 HOURS
Status: DISCONTINUED | OUTPATIENT
Start: 2018-08-18 | End: 2018-08-18

## 2018-08-18 RX ORDER — METHYLERGONOVINE MALEATE 0.2 MG/ML
200 INJECTION INTRAVENOUS
Status: DISCONTINUED | OUTPATIENT
Start: 2018-08-18 | End: 2018-08-18

## 2018-08-18 RX ORDER — IBUPROFEN 800 MG/1
800 TABLET, FILM COATED ORAL EVERY 6 HOURS PRN
Status: DISCONTINUED | OUTPATIENT
Start: 2018-08-18 | End: 2018-08-20 | Stop reason: HOSPADM

## 2018-08-18 RX ORDER — OXYTOCIN/0.9 % SODIUM CHLORIDE 30/500 ML
340 PLASTIC BAG, INJECTION (ML) INTRAVENOUS CONTINUOUS PRN
Status: DISCONTINUED | OUTPATIENT
Start: 2018-08-18 | End: 2018-08-20 | Stop reason: HOSPADM

## 2018-08-18 RX ORDER — OXYTOCIN 10 [USP'U]/ML
INJECTION, SOLUTION INTRAMUSCULAR; INTRAVENOUS
Status: DISCONTINUED
Start: 2018-08-18 | End: 2018-08-18 | Stop reason: WASHOUT

## 2018-08-18 RX ORDER — ACETAMINOPHEN 325 MG/1
650 TABLET ORAL EVERY 4 HOURS PRN
Status: DISCONTINUED | OUTPATIENT
Start: 2018-08-18 | End: 2018-08-18

## 2018-08-18 RX ADMIN — LIDOCAINE HYDROCHLORIDE,EPINEPHRINE BITARTRATE 3 ML: 15; .005 INJECTION, SOLUTION EPIDURAL; INFILTRATION; INTRACAUDAL; PERINEURAL at 06:32

## 2018-08-18 RX ADMIN — Medication 4 ML: at 06:36

## 2018-08-18 RX ADMIN — SENNOSIDES AND DOCUSATE SODIUM 1 TABLET: 8.6; 5 TABLET ORAL at 21:53

## 2018-08-18 RX ADMIN — Medication 10 ML/HR: at 06:40

## 2018-08-18 RX ADMIN — VANCOMYCIN HYDROCHLORIDE 1000 MG: 1 INJECTION, SOLUTION INTRAVENOUS at 06:09

## 2018-08-18 RX ADMIN — Medication 4 ML: at 06:40

## 2018-08-18 RX ADMIN — SODIUM CHLORIDE, POTASSIUM CHLORIDE, SODIUM LACTATE AND CALCIUM CHLORIDE: 600; 310; 30; 20 INJECTION, SOLUTION INTRAVENOUS at 07:17

## 2018-08-18 RX ADMIN — Medication 340 ML/HR: at 09:38

## 2018-08-18 RX ADMIN — SODIUM CHLORIDE, POTASSIUM CHLORIDE, SODIUM LACTATE AND CALCIUM CHLORIDE 1000 ML: 600; 310; 30; 20 INJECTION, SOLUTION INTRAVENOUS at 06:09

## 2018-08-18 RX ADMIN — IBUPROFEN 800 MG: 800 TABLET, FILM COATED ORAL at 10:20

## 2018-08-18 RX ADMIN — Medication: at 06:40

## 2018-08-18 RX ADMIN — IBUPROFEN 800 MG: 800 TABLET, FILM COATED ORAL at 16:23

## 2018-08-18 RX ADMIN — IBUPROFEN 800 MG: 800 TABLET, FILM COATED ORAL at 21:53

## 2018-08-18 RX ADMIN — LIDOCAINE HYDROCHLORIDE 5 ML: 10 INJECTION, SOLUTION EPIDURAL; INFILTRATION; INTRACAUDAL; PERINEURAL at 09:57

## 2018-08-18 ASSESSMENT — ACTIVITIES OF DAILY LIVING (ADL)
AMBULATION: 0-->INDEPENDENT
SWALLOWING: 0-->SWALLOWS FOODS/LIQUIDS WITHOUT DIFFICULTY
TOILETING: 0-->INDEPENDENT
FALL_HISTORY_WITHIN_LAST_SIX_MONTHS: NO
RETIRED_EATING: 0-->INDEPENDENT
DRESS: 0-->INDEPENDENT
RETIRED_COMMUNICATION: 0-->UNDERSTANDS/COMMUNICATES WITHOUT DIFFICULTY
TRANSFERRING: 0-->INDEPENDENT
COGNITION: 0 - NO COGNITION ISSUES REPORTED
BATHING: 0-->INDEPENDENT

## 2018-08-18 NOTE — PLAN OF CARE
Problem: Patient Care Overview  Goal: Plan of Care/Patient Progress Review  Outcome: Improving  Labor Shift Note  Data: Pt moved to room 473 from triage at 0550. Contraction frequency Q2-3 minutes, palpate strong. Fetal assessment AGA.   Vitals:    18 0650 18 0654 18 0700 18 0705   BP: 116/65 116/71 124/76 120/71   Resp:   18    Temp:   97.7  F (36.5  C)    TempSrc:   Oral    SpO2: 98%  98% 98%   .  .  Membranes intact.  Signs and symptoms of infection absent.  Blood pressures WNL. Signs and symptoms of pre-eclampsia: absent. First dose of antibiotics started for GBS+ status.  and sister present and supportive.  Interventions: Continue uterine/fetal assessment and vtal signs per order set. Pt now comfortable with an epidural in place.  Plan: Anticipate . Provide labor/coping assistance as needed by patient and support person.  Observe for and notify care provider of indications of progressing labor, need for pain medications,  or signs of fetal/maternal compromise.

## 2018-08-18 NOTE — H&P
ADMIT NOTE  =================  39w4d    Patricia Levi is a 37 year old female with an Patient's last menstrual period was 2017. and Estimated Date of Delivery: Aug 21, 2018 is [facility] on 2018 at 5:52 AM in active labor.     HPI  ================  Pt reports ctxs at 0130 q 5 minutes, now feeling rectal pressure with contractions. Denies LOF or VB. + FM. Desires epidural.    Contractions- moderate  Fetal movement- active  ROM- no   Vaginal bleeding- none  GBS- positive  FOB- is involved  Other labor support- sister    Weight gain- 206 - 160 lbs, Total weight gain- 43 lbs  Height- 64  BMI- 27  First prenatal visit at 9 weeks, Total visits- 12    PROBLEM LIST  =================  Patient Active Problem List    Diagnosis Date Noted     Encounter for triage in pregnant patient 2018     Priority: Medium     Labor and delivery, indication for care 2018     Priority: Medium     Positive GBS test 2018     Priority: Medium     Sensitivities pending.        Large for dates 2018     Priority: Medium     18: FH > dates  Growth u/s ordered    Presentation - cephalic  USEGA = 33 5/7 weeks.  EFW = 2,204 grams, 93 % for 31 weeks.  SANAM = 16.3cm.  FHR = 132bpm            High-risk pregnancy, elderly multigravida, , WHS CNM 2018     Priority: Medium     , MARYCARMEN 18 by LMP, c/w dating ultrasound      2018: OB intake   Desires level 2 ultrasound, MFM referral placed.    Undecided on 1st trimester screening, discuss again at [facility].   Needs record release completed at [facility] for pap _done_   Needs GC/CT at [facility] _done__  18: Declined 1st trimester screening, level II scheduled 3/27     Denies history of abnl paps, records requested     Desires CNM care           HISTORIES  ============  Allergies   Allergen Reactions     Penicillins Rash     Noted when child     Past Medical History:   Diagnosis Date     Adjustment disorder     situational during sons  illness leukemia      Asthma      Migraine headache      Seasonal allergies      Past Surgical History:   Procedure Laterality Date     DENTAL SURGERY      [place] teeth    .  Family History   Problem Relation Age of Onset     HEART DISEASE Maternal Grandfather      MI     Alcohol/Drug Paternal Grandfather      alcoholism     Hypertension Mother      Depression Sister      Dementia Paternal Grandmother      Anxiety Disorder Sister      Substance Abuse Sister      Leukemia Son      acute lymphoblastic leukemia      Glaucoma No family hx of      Macular Degeneration No family hx of      Social History   Substance Use Topics     Smoking status: Former Smoker     Years: 5.00     Quit date: 2006     Smokeless tobacco: Never Used     Alcohol use No      Comment: occ     Obstetric History       T1      L1     SAB0   TAB0   Ectopic0   Multiple0   Live Births1       # Outcome Date GA Lbr Jorge/2nd Weight Sex Delivery Anes PTL Lv   2 Current            1 Term 11 39w0d  3.884 kg (8 lb 9 oz) M  EPI N ALE      Obstetric Comments   IOL due to baby size IV pitocin.          LABS:   ===========  Prenatal Labs:  Rhogam not indicated   Lab Results   Component Value Date    ABO O 2018    RH Pos 2018    AS Neg 2018    HEPBANG Nonreactive 2018    TREPAB Negative 2018    HGB 12.5 2018     Rubella immune  Lab Results   Component Value Date    GBS Positive 2018     Other labs:  No results found for this or any previous visit (from the past 24 hour(s)).    ROS  =========  Pt denies significant respiratory, cardiovacular, GI, or muscular/skeletalcomplaints.    See RN data base ROS.     PHYSICAL EXAM:  ===============  /73  Temp 97.7  F (36.5  C) (Oral)  Resp 20  LMP 2017  General appearance: uncomfortable with contractions, coping well by breathing through  GENERAL APPEARANCE: healthy, alert and no distress  RESP: normal respiratory effort  CV: regular rates and  rhythm, normal S1 S2, no S3 or S4 and no murmur,and no varicosities  ABDOMEN:  soft, nontender, no epigastric pain  SKIN: no suspicious lesions or rashes  NEURO: Denies headache, blurred vision, other vision changes  PSYCH: mentation appears normal. and affect normal/bright  Legs: Reflexes normal bilaterally     Abdomen: gravid, vertex fetus per Leopold's, non-tender between contractions.   EFW-  8.5 lbs.   CONTACTIONS: moderate and every 3-3.5 minutes  FETAL HEART TONES: continuous EFM- baseline 145 with moderate variability and positive accelerations. No decelerations.  PELVIC EXAM: 7/ 90%/ -2, BOW palpated  CORBIN SCORE: n/a  BLOODY SHOW: no   ROM:no  FLUID: none  AMNISURE: not done    ASSESSMENT:  ==============  IUP @ 39w4d admitted in active labor   NST NOT DONE  Fetal Heart Rate - category one  GBS- positive, PCN allergy  Patient Active Problem List   Diagnosis     High-risk pregnancy, elderly multigravida, , WHS CNM     Large for dates     Positive GBS test     Encounter for triage in pregnant patient     Labor and delivery, indication for care      PLAN:  ===========  Admit - see IP orders  Pain medication options reviewed. Pt is interested in epidural.  Prophylactic IV antibiotic for positive GBS status reviewed with pt. Agreeable to vancomycin.  MD consultant on call Dr. Rahman/ available prn  Anticipate   ESTEE Rockwell CNM

## 2018-08-18 NOTE — ANESTHESIA PREPROCEDURE EVALUATION
Anesthesia Plan      History & Physical Review  History and physical reviewed and following examination; no interval change.    ASA Status:  2 emergent.        Plan for Epidural          Postoperative Care  Postoperative pain management:  Neuraxial analgesia.      Consents          ANESTHESIA PREOP EVALUATION    PROCEDURE: Labor Epidural Catheter    HPI: Patricia Levi is a 37 year old female who presents for above procedure 2/2 labor pain.     PAST MEDICAL HISTORY:    Past Medical History:   Diagnosis Date     Adjustment disorder     situational during sons illness leukemia      Asthma      Migraine headache      Seasonal allergies        PAST SURGICAL HISTORY:    Past Surgical History:   Procedure Laterality Date     DENTAL SURGERY      [place] teeth        PAST ANESTHESIA HISTORY:     No personal or family h/o anesthesia problems    SOCIAL HISTORY:       Social History   Substance Use Topics     Smoking status: Former Smoker     Years: 5.00     Quit date: 1/1/2006     Smokeless tobacco: Never Used     Alcohol use No      Comment: occ       ALLERGIES:     Allergies   Allergen Reactions     Penicillins Rash     Noted when child       MEDICATIONS:     Prescriptions Prior to Admission   Medication Sig Dispense Refill Last Dose     Cholecalciferol (VITAMIN D) 400 UNIT capsule Take 1 capsule by mouth daily.   Past Month at Unknown time     Prenatal Vit-Fe Fumarate-FA (PRENATAL MULTIVITAMIN PLUS IRON) 27-0.8 MG TABS per tablet Take 1 tablet by mouth daily   8/17/2018 at Unknown time     hydrocortisone (ANUSOL-HC) 2.5 % cream Place rectally 2 times daily as needed for hemorrhoids (Patient not taking: Reported on 7/18/2018) 30 g 0 Unknown at Unknown time     loratadine (CLARITIN) 10 MG tablet Take 1 tablet by mouth daily. (Patient not taking: Reported on 8/9/2018) 90 tablet 1 Unknown at Unknown time       No current outpatient prescriptions on file.       Current Facility-Administered Medications Ordered  "in Epic   Medication Dose Route Frequency Provider Last Rate Last Dose     acetaminophen (TYLENOL) tablet 650 mg  650 mg Oral Q4H PRN Berto Richardson APRN CNM         carboprost (HEMABATE) injection 250 mcg  250 mcg Intramuscular Once PRN Berto Richardson APRN CNM         ePHEDrine injection 5 mg  5 mg Intravenous Q3 Min PRN Moris Rae MD         fentaNYL (PF) (SUBLIMAZE) injection  mcg   mcg Intravenous Q1H PRN Berto Richardson APRN CNM         fentaNYL (SUBLIMAZE) 2 mcg/mL, bupivacaine (MARCAINE) 0.125% in NS premix for PCEA   EPIDURAL PCA Moris Rae MD         ibuprofen (ADVIL/MOTRIN) tablet 800 mg  800 mg Oral Once PRN Berto Richardson APRN CNM         lactated ringers BOLUS 250 mL  250 mL Intravenous Once PRN Moris Rae MD         lactated ringers BOLUS 500 mL  500 mL Intravenous Once PRN Berto Richardson APRN CNM         lactated ringers infusion   Intravenous Continuous Berto Richardson APRN CNM         lidocaine 1 % 0.1-20 mL  0.1-20 mL Subcutaneous Once PRN Berto Richardson APRN CNM         medication instruction   Does not apply Continuous PRMoris Duncan MD         medication instruction   Does not apply Continuous PRMoris Duncan MD         Medication Instructions: misoprostol (CYTOTEC)- Nurse to discuss ordering with provider, if needed. Ordered via \"OB misoprostol (CYTOTEC) Postpartum Hemorrhage PANEL\"   Does not apply Continuous PRN Berto Richardson APRN CNM         methylergonovine (METHERGINE) injection 200 mcg  200 mcg Intramuscular Once PRN Berto Richardson APRN CNM         nalbuphine (NUBAIN) injection 2.5-5 mg  2.5-5 mg Intravenous Q6H PRN Moris Rae MD         naloxone (NARCAN) injection 0.1-0.4 mg  0.1-0.4 mg Intravenous Q2 Min PRN Berto Richardosn APRN CNM         naloxone (NARCAN) injection 0.1-0.4 mg  0.1-0.4 mg Intravenous Q2 Min PRN Moris Rae MD         nitrous oxide/oxygen 50/50 blend   Inhalation Continuous PRN Berto Richardson, ESTEE CNM         ondansetron " (ZOFRAN) injection 4 mg  4 mg Intravenous Q6H PRN Berto Richardson APRN CNM         Opioid plan postpartum - medication instruction   Does not apply Continuous PRN Moris Rae MD         oxyCODONE-acetaminophen (PERCOCET) 5-325 MG per tablet 1 tablet  1 tablet Oral Once PRN Berto Richardson APRN CNM         oxytocin (PITOCIN) 30 units in 500 mL 0.9% NaCl infusion  100-340 mL/hr Intravenous Continuous PRN Berto Richardson APRN CNM         oxytocin (PITOCIN) injection 10 Units  10 Units Intramuscular Once PRN Berto Richardson APRN CNM         vancomycin (VANCOCIN) 1000 mg in dextrose 5% 200 mL PREMIX  1,000 mg Intravenous Q12H Berto Richardson APRN  mL/hr at 18 0609 1,000 mg at 18 0609     No current [facility]-ordered outpatient prescriptions on file.       PHYSICAL EXAM:    Vitals: T 97.7, P Data Unavailable, /73, R 20, SpO2  , Weight   Wt Readings from Last 2 Encounters:   08/15/18 92.4 kg (203 lb 11.2 oz)   18 91.7 kg (202 lb 1.6 oz)       See doc flowsheet    Temp: 36.5  C (97.7  F) Temp  Min: 36.5  C (97.7  F)  Max: 36.5  C (97.7  F)  Resp: 20 Resp  Min: 20  Max: 20    No Data Recorded    No Data Recorded    No Data Recorded  BP: 122/73 Systolic (24hrs), Av , Min:122 , Max:122   Diastolic (24hrs), Av, Min:73, Max:73      NPO STATUS: see doc flowsheet    LABS:    BMP:  Recent Labs   Lab Test  17   1032   NA  139   POTASSIUM  3.8   CHLORIDE  102   CO2  29   BUN  8   CR  0.64   GLC  90   SIDNEY  8.6       LFTs:   Recent Labs   Lab Test  17   1032   PROTTOTAL  7.9   ALBUMIN  4.1   BILITOTAL  0.6   ALKPHOS  66   AST  27   ALT  37       CBC:   Recent Labs   Lab Test  18   0602   WBC  14.8*   RBC  4.43   HGB  13.4   HCT  39.3   MCV  89   MCH  30.2   MCHC  34.1   RDW  13.0   PLT  288       Coags:  No results for input(s): INR, PTT, FIBR in the last 06828 hours.    Imaging:  No orders to display       Moris Rae MD  Anesthesiology Staff  Pager  [phone]    8/18/2018  6:11 AM

## 2018-08-18 NOTE — PLAN OF CARE
Patient arrived to [facility] unit via wheelchair at 1715,with belongings, accompanied by spouse/ significant other, with infant in arms. Received report from Dell and checked bands. Unit and room orientation completd. Call light given; no concerns present at this time. Continue with plan of care.

## 2018-08-18 NOTE — PLAN OF CARE
Data: Patient presented to [facility] at 0520.   Reason for maternal/fetal assessment per patient is Contractions  .  Patient is a . Prenatal record reviewed.      Obstetric History       T1      L1     SAB0   TAB0   Ectopic0   Multiple0   Live Births1       # Outcome Date GA Lbr Jorge/2nd Weight Sex Delivery Anes PTL Lv   2 Current            1 Term 11 39w0d  3.884 kg (8 lb 9 oz) M  EPI N ALE      Obstetric Comments   IOL due to baby size IV pitocin.     . Medical history:   Past Medical History:   Diagnosis Date     Adjustment disorder     situational during sons illness leukemia      Asthma      Migraine headache      Seasonal allergies    . Gestational Age 39w4d. VSS. Fetal movement present. Patient denies vaginal discharge, pelvic pressure, UTI symptoms, GI problems, bloody show, vaginal bleeding, edema, headache, visual disturbances, epigastric or URQ pain, abdominal pain, rupture of membranes. Support persons Contreras present.  Action: Verbal consent for EFM. Triage assessment completed. EFM applied for FHT. Uterine assessment for contractions. Fetal assessment: Presumed adequate fetal oxygenation documented (see flow record).   Response: Berto Richardson CNM informed of pt. Arrival and chief complaint. Cervix 7cm. Plan per provider is admit to inpatient and anticipate . Patient verbalized agreement with plan. Patient transferred to room 473 ambulatory, oriented to room and call light. Report given to CHRISSIE Jim.

## 2018-08-18 NOTE — IP AVS SNAPSHOT
[facility]    [address] [address]    Phone:  [phone]                                       After Visit Summary   8/18/2018    Patricia Levi    MRN: [id number]           After Visit Summary Signature Page     I have received my discharge instructions, and my questions have been answered. I have discussed any challenges I see with this plan with the nurse or doctor.    ..........................................................................................................................................  Patient/Patient Representative Signature      ..........................................................................................................................................  Patient Representative Print Name and Relationship to Patient    ..................................................               ................................................  Date                                            Time    ..........................................................................................................................................  Reviewed by Signature/Title    ...................................................              ..............................................  Date                                                            Time

## 2018-08-18 NOTE — PLAN OF CARE
Data: Patricia Levi transferred to 7128 via wheelchair at 1700. Baby transferred via parent's arms.  Action: Receiving unit notified of transfer: Yes. Patient and family notified of room change. Report given to Kamryn DICKENS at 1715. Belongings sent to receiving unit. Accompanied by Registered Nurse. Oriented patient to surroundings. Call light within reach. ID bands double-checked with receiving RN.  Response: Patient tolerated transfer and is stable.

## 2018-08-18 NOTE — ANESTHESIA PROCEDURE NOTES
Epidural Procedure Note    Staff:     Anesthesiologist:  PADMINI MUNOZ  Location: OB     Procedure start time:  8/18/2018 6:20 AM     Procedure end time:  8/18/2018 6:40 AM   Pre-procedure checklist:   patient identified, IV checked, site marked, risks and benefits discussed, informed consent, monitors and equipment checked, pre-op evaluation, at physician/surgeon's request and post-op pain management      Correct Patient: Yes      Correct Position: Yes      Correct Site: Yes      Correct Procedure: Yes      Correct Laterality:  Yes    Site Marked:  Yes  Procedure:     Procedure:  Epidural catheter    ASA:  Emergent and 2    Diagnosis:  Labor pain    Position:  Sitting    Sterile Prep: chloraprep      Insertion site:  L2-3    Local skin infiltration:  1% lidocaine    amount (mL):  5    Approach:  Midline    Needle gauge (G):  17    Needle Length (in):  3.5    Block Needle Type:  Andreauhannalee    Injection Technique:  LORT saline    LEOLA at (cm):  5    Attempts:  2    Redirects:  1    Catheter gauge (G):  19    Catheter threaded easily: Yes      Threaded to cm at skin:  10    Threaded in epidural space (cm):  5    Paresthesias:  No    Aspiration negative for Heme or CSF: Yes      Test dose (mL):  3     Local anesthetic:  Lidocaine 1.5% w/ 1:200,000 epinephrine    Test dose time:  06:32    Test dose negative for signs of intravascular, subdural or intrathecal injection: Yes

## 2018-08-18 NOTE — IP AVS SNAPSHOT
MRN:[id number]                      After Visit Summary   8/18/2018    Patricia Levi    MRN: [id number]           Thank you!     Thank you for choosing [facility] for your care. Our goal is always to provide you with excellent care. Hearing back from our patients is one way we can continue to improve our services. Please take a few minutes to complete the written survey that you may receive in the mail after you visit with us. Thank you!        Patient Information     Date Of Birth          1980        Designated Caregiver       Most Recent Value    Caregiver    Will someone help with your care after discharge? no      About your hospital stay     You were admitted on:  August 18, 2018 You last received care in the:  [facility]    You were discharged on:  August 20, 2018        Reason for your hospital stay       Maternity care                  Who to Call     For medical emergencies, please call 911.  For non-urgent questions about your medical care, please call your primary care provider or clinic, [phone]          Attending Provider     Provider Specialty    Jennifer Patel APRN [facility]    Berto Richardson APRN [facility]       Primary Care Provider Office Phone # Fax #    Isac Fatima [phone]      After Care Instructions     Activity       Review discharge instructions            Diet       Resume previous diet            Discharge Instructions - Postpartum visit       Schedule postpartum visit with your provider and return to clinic in 6 weeks.                  Follow-up Appointments     Follow Up and recommended labs and tests       Follow up with midwife  within 6wks.                  Your next 10 appointments already scheduled     Aug 21, 2018 11:15 AM CDT   RETURN OB with ESTEE Santana CNM   [facility] ([facility])    [facility] [address]  [address]  [facility] [address]    [phone]              Further instructions from your care team       Postpartum Vaginal Delivery Instructions    Activity       Ask family and friends for help when you need it.    Do not place anything in your vagina for 6 weeks.    You are not restricted on other activities, but take it easy for a few weeks to allow your body to recover from delivery.  You are able to do any activities you feel up to that point.    No driving until you have stopped taking your pain medications (usually two weeks after delivery).     Call your health care provider if you have any of these symptoms:       Increased pain, swelling, redness, or fluid around your stiches from an episiotomy or perineal tear.    A fever above 100.4 F (38 C) with or without chills when placing a thermometer under your tongue.    You soak a sanitary pad with blood within 1 hour, or you see blood clots larger than a golf ball.    Bleeding that lasts more than 6 weeks.    Vaginal discharge that smells bad.    Severe pain, cramping or tenderness in your lower belly area.    A need to urinate more frequently (use the toilet more often), more urgently (use the toilet very quickly), or it burns when you urinate.    Nausea and vomiting.    Redness, swelling or pain around a vein in your leg.    Problems breastfeeding or a red or painful area on your breast.    Chest pain and cough or are gasping for air.    Problems coping with sadness, anxiety, or depression.  If you have any concerns about hurting yourself or the baby, call your provider immediately.     You have questions or concerns after you return home.     Keep your hands clean:  Always wash your hands before touching your perineal area and stitches.  This helps reduce your risk of infection.  If your hands aren't dirty, you may use an alcohol hand-rub to clean your hands. Keep your nails clean and short.        Pending Results     No orders found from 8/16/2018 to 8/19/2018.            Statement of  Approval     Ordered          08/20/18 0700  I have reviewed and agree with all the recommendations and orders detailed in this document.  EFFECTIVE NOW     Approved and electronically signed by:  Esperanza Wadsworth APRN CNM             Admission Information     Date & Time Provider Department Dept. Phone    8/18/2018 Berto Richardson, ESTEE HUDDLESTON [facility] [phone]      Your Vitals Were     Blood Pressure Temperature Respirations Weight Last Period Pulse Oximetry    106/75 98.2  F (36.8  C) (Oral) 16 93 kg (205 lb) 11/14/2017 98%    BMI (Body Mass Index)                   35.19 kg/m2           MyChart Information     [facility] gives you secure access to your electronic health record. If you see a primary care provider, you can also send messages to your care team and make appointments. If you have questions, please call your primary care clinic.  If you do not have a primary care provider, please call [phone] and they will assist you.        Care EveryWhere ID     This is your Care EveryWhere ID. This could be used by other organizations to access your [facility] medical records  [id number]        Equal Access to Services     HECTOR BOWENS : Hadii ethan Loyola, wajasmyn elliott, qaybkyle rodriguez, gianfranco wilson. So [facility] [phone].    ATENCIÓN: Si habla español, tiene a lin disposición servicios gratuitos de asistencia lingüística. Llame al [phone].    We comply with applicable federal civil rights laws and [place] laws. We do not discriminate on the basis of race, color, national origin, age, disability, sex, sexual orientation, or gender identity.               Review of your medicines      CONTINUE these medicines which have NOT CHANGED        Dose / Directions    hydrocortisone 2.5 % cream   Commonly known as:  ANUSOL-HC   Used for:  High-risk pregnancy, elderly multigravida, unspecified trimester, External hemorrhoids        Place rectally 2 times daily as  needed for hemorrhoids   Quantity:  30 g   Refills:  0       loratadine 10 MG tablet   Commonly known as:  CLARITIN   Used for:  Allergic rhinitis, cause unspecified        Dose:  10 mg   Take 1 tablet by mouth daily.   Quantity:  90 tablet   Refills:  1       prenatal multivitamin plus iron 27-0.8 MG Tabs per tablet        Dose:  1 tablet   Take 1 tablet by mouth daily   Refills:  0       vitamin D 400 units capsule        Dose:  1 capsule   Take 1 capsule by mouth daily.   Refills:  0                Protect others around you: Learn how to safely use, store and throw away your medicines at www.disposemymeds.org.             Medication List: This is a list of all your medications and when to take them. Check marks below indicate your daily home schedule. Keep this list as a reference.      Medications           Morning Afternoon Evening Bedtime As Needed    hydrocortisone 2.5 % cream   Commonly known as:  ANUSOL-HC   Place rectally 2 times daily as needed for hemorrhoids                                loratadine 10 MG tablet   Commonly known as:  CLARITIN   Take 1 tablet by mouth daily.                                prenatal multivitamin plus iron 27-0.8 MG Tabs per tablet   Take 1 tablet by mouth daily                                vitamin D 400 units capsule   Take 1 capsule by mouth daily.

## 2018-08-18 NOTE — PLAN OF CARE
Problem: Patient Care Overview  Goal: Plan of Care/Patient Progress Review  Outcome: Improving   of viable Male with Jennifer Patel in attendance. Nursery RN present. Infant with spontaneous cry, to mother's abdomen, dried and stimulated. Placenta delivered with out complication, laceration with repair, hieu cares provided.  Mother and baby in stable condition.

## 2018-08-19 LAB — HGB BLD-MCNC: 11.8 G/DL (ref 11.7–15.7)

## 2018-08-19 PROCEDURE — 36415 COLL VENOUS BLD VENIPUNCTURE: CPT | Performed by: ADVANCED PRACTICE MIDWIFE

## 2018-08-19 PROCEDURE — 25000132 ZZH RX MED GY IP 250 OP 250 PS 637: Performed by: ADVANCED PRACTICE MIDWIFE

## 2018-08-19 PROCEDURE — 12000028 ZZH R&B OB UMMC

## 2018-08-19 PROCEDURE — 85018 HEMOGLOBIN: CPT | Performed by: ADVANCED PRACTICE MIDWIFE

## 2018-08-19 RX ADMIN — ACETAMINOPHEN 650 MG: 325 TABLET, FILM COATED ORAL at 09:21

## 2018-08-19 RX ADMIN — IBUPROFEN 800 MG: 800 TABLET, FILM COATED ORAL at 22:17

## 2018-08-19 RX ADMIN — SENNOSIDES AND DOCUSATE SODIUM 2 TABLET: 8.6; 5 TABLET ORAL at 09:17

## 2018-08-19 RX ADMIN — ACETAMINOPHEN 650 MG: 325 TABLET, FILM COATED ORAL at 23:42

## 2018-08-19 RX ADMIN — IBUPROFEN 800 MG: 800 TABLET, FILM COATED ORAL at 10:09

## 2018-08-19 RX ADMIN — IBUPROFEN 800 MG: 800 TABLET, FILM COATED ORAL at 16:04

## 2018-08-19 RX ADMIN — SENNOSIDES AND DOCUSATE SODIUM 2 TABLET: 8.6; 5 TABLET ORAL at 22:17

## 2018-08-19 RX ADMIN — IBUPROFEN 800 MG: 800 TABLET, FILM COATED ORAL at 03:51

## 2018-08-19 NOTE — PLAN OF CARE
Problem: Patient Care Overview  Goal: Plan of Care/Patient Progress Review  Outcome: Improving  Data: Vital signs within normal limits. Postpartum checks within normal limits - see flow record. Patient eating and drinking normally. Patient able to empty bladder independently and is up ambulating. Patient performing self cares and is able to care for infant.  Action: Patient medicated during the shift for uterine cramping and perineum soreness.  Response: Positive attachment behaviors observed with infant.  present and supportive.   Will continue to monitor and provide support.

## 2018-08-19 NOTE — PROGRESS NOTES
"Patricia Levi      MRN#: [id number]  Age: 37 year old      YOB: 1980      PPD #1 S/P   Patient reports feeling well and is without concerns. Bonding well with baby boy \"Frank\". Baby is rooming in  Breastfeeding status:initiated and well established Complications since 2 hours post-delivery: None  Patient is tolerating regular diet,  tolerating acitivity well, voiding without difficulty, no BM yet, cramping is minimal and is relieved by Ibuprophen, lochia is decreasing, no clots.  Perineal pain is is minimal and is relieved by Ibuprophen.  The perineum laceration is well approximated. Family at bedside for support.      Per peds, baby will stay 48 hours for evaluation for GBS pos with tx with Vanco x 1. Pt desires discharge tomorrow with baby rather than discharge to rooming in status today.    SIGNIFICANT PROBLEMS:  Patient Active Problem List    Diagnosis Date Noted     Encounter for triage in pregnant patient 2018     Priority: Medium     Labor and delivery, indication for care 2018     Priority: Medium      (normal spontaneous vaginal delivery) 2018     Priority: Medium     Positive GBS test 2018     Priority: Medium     Sensitivities pending.        Large for dates 2018     Priority: Medium     18: FH > dates  Growth u/s ordered    Presentation - cephalic  USEGA = 33 5/7 weeks.  EFW = 2,204 grams, 93 % for 31 weeks.  SANAM = 16.3cm.  FHR = 132bpm            High-risk pregnancy, elderly multigravida, , [facility] CNM 2018     Priority: Medium     , MARYCARMEN 18 by LMP, c/w dating ultrasound      2018: OB intake   Desires level 2 ultrasound, MFM referral placed.    Undecided on 1st trimester screening, discuss again at [facility].   Needs record release completed at [facility] for pap _done_   Needs GC/CT at [facility] _done__  18: Declined 1st trimester screening, level II scheduled 3/27     Denies history of abnl paps, records requested     " Desires CN care             PE:    Vitals:    08/18/18 1306 08/18/18 1727 08/18/18 2200 08/19/18 0000   BP: 120/60 111/68 97/77 97/57   Resp: 18 18 16 16   Temp: 98.2  F (36.8  C) 98  F (36.7  C) 98  F (36.7  C) 98.2  F (36.8  C)   TempSrc: Oral Oral Oral Oral   SpO2: 98%      Weight:           NAD  Breasts: Soft, filling  Nipples: Intact, Non-tender  Abdomen: Soft, Non-tender      Uterus: Fundus Firm, Non-tender, located 2 FB below the umbilicus   Lochia: Light rubra    Perineum:  Well-approximated, minimal edema   Lower Extremities: No Edema Bilateral, Negative Susanne's Sign      Postpartum hemoglobin   Hemoglobin   Date Value Ref Range Status   08/19/2018 11.8 11.7 - 15.7 g/dL Final     Invalid input(s): HEMOGLOBIN, HEMATOCRIT  Blood type   Lab Results   Component Value Date    ABO O 08/18/2018       Lab Results   Component Value Date    RH Pos 08/18/2018     Rubella status: immune  Tdap administered 6/7/2018      Assessment/Plan-   Stable Post-partum day #1  Complications:none  Breastfeeding  Rhophylac not indicated  MMR not indicated.  Tdap not indicated.    Teaching done: D/C Instructions: Nutrition/Activity, Engorgement Management, Birth Control Options, Warning Signs/When to Call: Excessive Bleeding, Infection, PP Depression, RTC Clinic for PP Appointment, PNV and Kegals and Gentle Abdominal Exercises    Postpartum warning s/s reviewed, including bleeding/clots, fever, mastitis, post-partum mood disorders and pre-eclampsia.     Birthcontrol planned:Progestin only pills. Would like to wait until 6 weeks pp to initiate.     Current Discharge Medication List      CONTINUE these medications which have NOT CHANGED    Details   Cholecalciferol (VITAMIN D) 400 UNIT capsule Take 1 capsule by mouth daily.      Prenatal Vit-Fe Fumarate-FA (PRENATAL MULTIVITAMIN PLUS IRON) 27-0.8 MG TABS per tablet Take 1 tablet by mouth daily      hydrocortisone (ANUSOL-HC) 2.5 % cream Place rectally 2 times daily as needed for  hemorrhoids  Qty: 30 g, Refills: 0    Associated Diagnoses: High-risk pregnancy, elderly multigravida, unspecified trimester; External hemorrhoids      loratadine (CLARITIN) 10 MG tablet Take 1 tablet by mouth daily.  Qty: 90 tablet, Refills: 1    Associated Diagnoses: Allergic rhinitis, cause unspecified             Routine Postpartum Management  Postpartum discharge class today if interested  Anticipate discharge to home tomorrow  Plan d/c home tomorrow  RTC 2 weeks prn and 6 weeks        Jennifer Patel CNM, APRN

## 2018-08-19 NOTE — PLAN OF CARE
Problem: Patient Care Overview  Goal: Plan of Care/Patient Progress Review  Outcome: No Change  Patient doing very well. Did request to stay one more night with baby, due to inadequate treatment due to mom's positive GBS status. Breastfeeding on cue, independently with excellent latch. Independent with cares.  very supportive.

## 2018-08-19 NOTE — PLAN OF CARE
Problem: Patient Care Overview  Goal: Plan of Care/Patient Progress Review  Outcome: Improving  Data: Vital signs within normal limits. Postpartum checks within normal limits - see flow record. Patient eating and drinking normally. Patient able to empty bladder independently and is up ambulating. No apparent signs of infection. Perineum healing well. Patient performing self cares and is able to care for infant.  Action: Patient medicated during the shift for cramping. See MAR. Patient reassessed within 1 hour after each medication and pain was improved - patient stated she was comfortable. Patient education done about vital signs, pain. See flow record.  Response: Positive attachment behaviors observed with infant. Support persons are present.   Plan: Anticipate discharge on Sunday/Monday.

## 2018-08-20 VITALS
DIASTOLIC BLOOD PRESSURE: 75 MMHG | WEIGHT: 205 LBS | RESPIRATION RATE: 16 BRPM | TEMPERATURE: 98.2 F | SYSTOLIC BLOOD PRESSURE: 106 MMHG | BODY MASS INDEX: 35.19 KG/M2 | OXYGEN SATURATION: 98 %

## 2018-08-20 PROBLEM — Z36.89 ENCOUNTER FOR TRIAGE IN PREGNANT PATIENT: Status: RESOLVED | Noted: 2018-08-18 | Resolved: 2018-08-20

## 2018-08-20 PROCEDURE — 25000132 ZZH RX MED GY IP 250 OP 250 PS 637: Performed by: ADVANCED PRACTICE MIDWIFE

## 2018-08-20 RX ADMIN — SENNOSIDES AND DOCUSATE SODIUM 1 TABLET: 8.6; 5 TABLET ORAL at 09:34

## 2018-08-20 RX ADMIN — IBUPROFEN 800 MG: 800 TABLET, FILM COATED ORAL at 05:22

## 2018-08-20 NOTE — PLAN OF CARE
Problem: Patient Care Overview  Goal: Plan of Care/Patient Progress Review  Outcome: Therapy, progress toward functional goals as expected  Data: Vital signs within normal limits. Postpartum checks within normal limits - see flow record. Patient eating and drinking normally. Patient able to empty bladder independently and is up ambulating. No apparent signs of infection.  Patient performing self cares and is able to care for infant. Breastfeeding infant independently.   Action: Patient medicated during the shift for cramping. See MAR. Patient reassessed within 1 hour after each medication and pain was improved - patient stated she was comfortable.   Response: Positive attachment behaviors observed with infant. FOB present.   Plan: Anticipate discharge on 8/20.

## 2018-08-20 NOTE — DISCHARGE SUMMARY
[facility] Discharge Summary    Patricia Levi MRN# [id number]   Age: 37 year old YOB: 1980     Date of Admission:  2018  Date of Discharge::  2018 11:30 AM  Admitting Physician:  ESTEE Hurtado CNM  Discharge Physician:  Esperanza Wadsworth CNM, MS      Home clinic: [facility]          Admission Diagnoses:   Maternity*jef:  18/labor  Encounter for triage in pregnant patient  Labor and delivery, indication for care   (normal spontaneous vaginal delivery)          Discharge Diagnosis:   Normal spontaneous vaginal delivery  Intrauterine pregnancy at 39 weeks gestation          Procedures:   Procedure(s): No additional procedures performed       No procedures performed during this admission           Medications Prior to Admission:     No prescriptions prior to admission.             Discharge Medications:     Discharge Medication List as of 2018 10:40 AM      CONTINUE these medications which have NOT CHANGED    Details   Cholecalciferol (VITAMIN D) 400 UNIT capsule Take 1 capsule by mouth daily., 1 capsule, Oral, DAILY, Until Discontinued, Historical      hydrocortisone (ANUSOL-HC) 2.5 % cream Place rectally 2 times daily as needed for hemorrhoidsDisp-30 g, [id number]      loratadine (CLARITIN) 10 MG tablet Take 1 tablet by mouth daily., 10 mg, Oral, DAILY Starting 2013, Until Discontinued, Disp-90 tablet, R-1, E-Prescribe      Prenatal Vit-Fe Fumarate-FA (PRENATAL MULTIVITAMIN PLUS IRON) 27-0.8 MG TABS per tablet Take 1 tablet by mouth daily, Historical                   Consultations:   No consultations were requested during this admission          Brief History of Labor:   Delivery Note:   IUP at 39 weeks 4 days gestation delivered on 2018.     delivery of a viable Male infant.  Weight : 8 pounds 2 ounces   Apgars of 9 at 1 minute and 8 at 5 minutes.  Labor was spontaneous.  Medications administered  in labor:  Pain  Rx Epidural; Antibiotics - Vancomycin x 1  Perineum: 1st degree  Placenta-mechanism: spontaneous, intact,  with a 3 vessel cord. IV oxytocin was given after delivery of baby  Quantitative Blood Loss was 358 mL  Complications of labor and delivery: Category two FHT tracing  Anticipated Discharge Date: 8/19/2018  Birth attendants: ESTEE Brewster CNM       Assessment Day of Discharge      Breasts:nipples intact   Fundus:FF U/2  Abdomen:soft NT  Lochia:rubra scant  Perineum:lac intact min edema   Legs:neg homans            Hospital Course:   The patient's hospital course was unremarkable.  On discharge, her pain was well controlled. Vaginal bleeding is similar to peak menstrual flow.  Voiding without difficulty.  Ambulating well and tolerating a normal diet.  No fever.  Breastfeeding well.  Infant is stable.  No bowel movement yet.*  She was discharged on post-partum day #2.    Post-partum hemoglobin:   Hemoglobin   Date Value Ref Range Status   08/19/2018 11.8 11.7 - 15.7 g/dL Final             Discharge Instructions and Follow-Up:   Discharge diet: Regular   Discharge activity: Activity as tolerated   Discharge follow-up: Follow up with maikel in 6 weeks   Wound care: Drink plenty of fluids           Discharge Disposition:   Discharged to home        Esperanza Wadsworth cnm aprn

## 2018-08-20 NOTE — PLAN OF CARE
Problem: Patient Care Overview  Goal: Plan of Care/Patient Progress Review  Outcome: Adequate for Discharge Date Met: 08/20/18  Patient has been breastfeeding independently on demand and denies pain nor discomfort. Discharged with baby today at 1130 and discharge instructions went over with her and verbalized understanding.

## 2018-08-30 ENCOUNTER — TELEPHONE (OUTPATIENT)
Dept: OBGYN | Facility: CLINIC | Age: 38
End: 2018-08-30

## 2018-09-28 ENCOUNTER — OFFICE VISIT (OUTPATIENT)
Dept: OBGYN | Facility: CLINIC | Age: 38
End: 2018-09-28
Attending: ADVANCED PRACTICE MIDWIFE
Payer: COMMERCIAL

## 2018-09-28 VITALS
HEIGHT: 64 IN | SYSTOLIC BLOOD PRESSURE: 105 MMHG | BODY MASS INDEX: 29.84 KG/M2 | DIASTOLIC BLOOD PRESSURE: 70 MMHG | WEIGHT: 174.8 LBS | HEART RATE: 70 BPM

## 2018-09-28 DIAGNOSIS — Z30.011 OCP (ORAL CONTRACEPTIVE PILLS) INITIATION: ICD-10-CM

## 2018-09-28 PROBLEM — B95.1 POSITIVE GBS TEST: Status: RESOLVED | Noted: 2018-07-27 | Resolved: 2018-09-28

## 2018-09-28 PROCEDURE — G0463 HOSPITAL OUTPT CLINIC VISIT: HCPCS | Mod: ZF

## 2018-09-28 RX ORDER — ACETAMINOPHEN AND CODEINE PHOSPHATE 120; 12 MG/5ML; MG/5ML
0.35 SOLUTION ORAL DAILY
Qty: 84 TABLET | Refills: 3 | Status: SHIPPED | OUTPATIENT
Start: 2018-09-28 | End: 2022-07-03

## 2018-09-28 ASSESSMENT — ANXIETY QUESTIONNAIRES
5. BEING SO RESTLESS THAT IT IS HARD TO SIT STILL: NOT AT ALL
GAD7 TOTAL SCORE: 0
2. NOT BEING ABLE TO STOP OR CONTROL WORRYING: NOT AT ALL
1. FEELING NERVOUS, ANXIOUS, OR ON EDGE: NOT AT ALL
7. FEELING AFRAID AS IF SOMETHING AWFUL MIGHT HAPPEN: NOT AT ALL
3. WORRYING TOO MUCH ABOUT DIFFERENT THINGS: NOT AT ALL
6. BECOMING EASILY ANNOYED OR IRRITABLE: NOT AT ALL

## 2018-09-28 ASSESSMENT — PATIENT HEALTH QUESTIONNAIRE - PHQ9: 5. POOR APPETITE OR OVEREATING: NOT AT ALL

## 2018-09-28 NOTE — LETTER
"2018     RE: Patricia Levi  [address] [facility] [place] [address]     Dear Colleague,    Thank you for referring your patient, Patricia Levi, to the [facility] at [facility]. Please see a copy of my visit note below.    Nursing Notes:   Radha Mederos CMA  2018  2:35 PM  Signed  SUBJECTIVE:   Patricia Levi is here for her 6-week postpartum checkup.     PHQ-9 score: 0  Hx of Abuse:  No    Delivery Date: 2018.    Delivering provider:  Jennifer Patel.    Type of delivery:  .  Perineum:  tear, with repair.     Delivery complications: None  Infant gender:  boy, weight 8 pounds 2.5 oz.  Feeding Method:  .  Complications reported with feeding:  none, infant thriving .    Bleeding:  None.  Duration:  5weeks.  Menses resumed:  No  Bowel/Urinary problems:  No    Contraception Planned:  oral contraceptive  She  has not had intercourse since delivery..      ================================================================  Pt reports postpartum period has been going well.  Has been breastfeeding, has more forceful letdown with this baby. Has been working with lactation consultant, trying blocked feedings. Bleeding stopped last week. Denies any issues with urination. Denies constipation or hemorrhoids. Denies pain at perineal site. Mood stable, denies depression or anxiety. Has not had intercourse since delivery.  Planning POPs at this time. Not planning any more children. Not interested in LARC method.  Last pap 2015 NILM, HPV neg.  ================================================================  ROS: 10 point ROS neg other than the symptoms noted above in the HPI.   CONSTITUTIONAL: negative  RESPIRATORY: negative  CARDIOVASCULAR: negative  GI: negative  PSYCHIATRIC: negative    EXAM:  /70 (BP Location: Left arm, Patient Position: Chair)  Pulse 70  Ht 1.626 m (5' 4\")  Wt 79.3 kg (174 lb 12.8 oz)  LMP 2017  BMI " 30 kg/m2    General: healthy, alert and no distress  Psych: NEGATIVE  Last PHQ-9 score on record= No Value exists for the : HP#PHQ9  Breasts:  Lactating  Abdomen: Benign, Soft, flat, non-tender, No masses, organomegaly and Diastasis less than 1-2 FB  Incision:  None   Vulva:  Normal genitalia and Bartholin's, Urethra, [place]'s normal  Vagina:  normal with good muscle tone and epis/repair well healed  Cervix:  Multiparous, and moist, closed, without lesion or CMT.    Uterus:  fully involuted and non-tender    Adnexa:  Within normal limits and No masses, nodularity, tenderness  Recto-vaginal: anus normal, hemorrhoid    Assessment:  Encounter Diagnoses   Name Primary?     OCP (oral contraceptive pills) initiation Yes     Routine postpartum follow-up       Normal postpartum exam after   Pregnancy was complicated by:  none.      Plan:   Orders Placed This Encounter   Medications     norethindrone (MICRONOR) 0.35 MG per tablet     Sig: Take 1 tablet (0.35 mg) by mouth daily     Dispense:  84 tablet     Refill:  3      Contraception methods discussed. Pt not interested in LARC method at this time. Discussed other progesterone-only methods. Also discussed vasectomy or permanent methods. Pt opts for POPs at this time. Discussed effectiveness, side effects. Encouraged back-up method x 1 week.   Signs and symptoms of postpartum depression/anxiety discussed  Plan pap in 2020  Exercise encouraged  Follow up in 1 year or earlier prn    Again, thank you for allowing me to participate in the care of your patient.      Sincerely,    ESTEE Rockwell CNM

## 2018-09-28 NOTE — NURSING NOTE
SUBJECTIVE:   Patricia Levi is here for her 6-week postpartum checkup.     PHQ-9 score: 0  Hx of Abuse:  No    Delivery Date: 2018.    Delivering provider:  Jennifer Patel.    Type of delivery:  .  Perineum:  tear, with repair.     Delivery complications: None  Infant gender:  boy, weight 8 pounds 2.5 oz.  Feeding Method:  .  Complications reported with feeding:  none, infant thriving .    Bleeding:  None.  Duration:  5weeks.  Menses resumed:  No  Bowel/Urinary problems:  No    Contraception Planned:  oral contraceptive  She  has not had intercourse since delivery..

## 2018-09-28 NOTE — PROGRESS NOTES
"Nursing Notes:   Radha Mederos CMA  2018  2:35 PM  Signed  SUBJECTIVE:   Patricia Levi is here for her 6-week postpartum checkup.     PHQ-9 score: 0  Hx of Abuse:  No    Delivery Date: 2018.    Delivering provider:  Jennifer Patel.    Type of delivery:  .  Perineum:  tear, with repair.     Delivery complications: None  Infant gender:  boy, weight 8 pounds 2.5 oz.  Feeding Method:  .  Complications reported with feeding:  none, infant thriving .    Bleeding:  None.  Duration:  5weeks.  Menses resumed:  No  Bowel/Urinary problems:  No    Contraception Planned:  oral contraceptive  She  has not had intercourse since delivery..      ================================================================  Pt reports postpartum period has been going well.  Has been breastfeeding, has more forceful letdown with this baby. Has been working with lactation consultant, trying blocked feedings. Bleeding stopped last week. Denies any issues with urination. Denies constipation or hemorrhoids. Denies pain at perineal site. Mood stable, denies depression or anxiety. Has not had intercourse since delivery.  Planning POPs at this time. Not planning any more children. Not interested in LARC method.  Last pap 2015 NILM, HPV neg.  ================================================================  ROS: 10 point ROS neg other than the symptoms noted above in the HPI.   CONSTITUTIONAL: negative  RESPIRATORY: negative  CARDIOVASCULAR: negative  GI: negative  PSYCHIATRIC: negative    EXAM:  /70 (BP Location: Left arm, Patient Position: Chair)  Pulse 70  Ht 1.626 m (5' 4\")  Wt 79.3 kg (174 lb 12.8 oz)  LMP 2017  BMI 30 kg/m2    General: healthy, alert and no distress  Psych: NEGATIVE  Last PHQ-9 score on record= No Value exists for the : HP#PHQ9  Breasts:  Lactating  Abdomen: Benign, Soft, flat, non-tender, No masses, organomegaly and Diastasis less than 1-2 FB  Incision:  None   Vulva:  Normal " genitalia and [place]th[place]'s, Urethra, [place]'s normal  Vagina:  normal with good muscle tone and epis/repair well healed  Cervix:  Multiparous, and moist, closed, without lesion or CMT.    Uterus:  fully involuted and non-tender    Adnexa:  Within normal limits and No masses, nodularity, tenderness  Recto-vaginal: anus normal, hemorrhoid    Assessment:  Encounter Diagnoses   Name Primary?     OCP (oral contraceptive pills) initiation Yes     Routine postpartum follow-up       Normal postpartum exam after   Pregnancy was complicated by:  none.      Plan:   Orders Placed This Encounter   Medications     norethindrone (MICRONOR) 0.35 MG per tablet     Sig: Take 1 tablet (0.35 mg) by mouth daily     Dispense:  84 tablet     Refill:  3      Contraception methods discussed. Pt not interested in LARC method at this time. Discussed other progesterone-only methods. Also discussed vasectomy or permanent methods. Pt opts for POPs at this time. Discussed effectiveness, side effects. Encouraged back-up method x 1 week.   Signs and symptoms of postpartum depression/anxiety discussed  Plan pap in 2020  Exercise encouraged  Follow up in 1 year or earlier prn

## 2018-09-28 NOTE — MR AVS SNAPSHOT
"              After Visit Summary   9/28/2018    Patricia Levi    MRN: [id number]           Patient Information     Date Of Birth          1980        Visit Information        Provider Department      9/28/2018 2:30 PM Berot Richardson APRN CNM [facility]        Today's Diagnoses     Routine postpartum follow-up    -  1    OCP (oral contraceptive pills) initiation           Follow-ups after your visit        Follow-up notes from your care team     Return in about 1 year (around 9/28/2019) for Annual exam.      Who to contact     Please call your clinic at [phone] to:    Ask questions about your health    Make or cancel appointments    Discuss your medicines    Learn about your test results    Speak to your doctor            Additional Information About Your Visit        [facility] [employer] Information     [employer] gives you secure access to your electronic health record. If you see a primary care provider, you can also send messages to your care team and make appointments. If you have questions, please call your primary care clinic.  If you do not have a primary care provider, please call [phone] and they will assist you.      [employer] is an electronic gateway that provides easy, online access to your medical records. With [employer], you can request a clinic appointment, read your test results, renew a prescription or communicate with your care team.     To access your existing account, please contact your [facility] or call [phone] for assistance.        Care EveryWhere ID     This is your Care EveryWhere ID. This could be used by other organizations to access your [place] medical records  [id number]        Your Vitals Were     Pulse Height Last Period BMI (Body Mass Index)          70 1.626 m (5' 4\") 11/14/2017 30 kg/m2         Blood Pressure from Last 3 Encounters:   09/28/18 105/70   08/20/18 106/75   08/15/18 118/82    Weight from Last 3 Encounters: "   09/28/18 79.3 kg (174 lb 12.8 oz)   08/18/18 93 kg (205 lb)   08/15/18 92.4 kg (203 lb 11.2 oz)              Today, you had the following     No orders found for display         Today's Medication Changes          These changes are accurate as of 9/28/18  2:58 PM.  If you have any questions, ask your nurse or doctor.               Start taking these medicines.        Dose/Directions    norethindrone 0.35 MG per tablet   Commonly known as:  MICRONOR   Used for:  OCP (oral contraceptive pills) initiation        Dose:  0.35 mg   Take 1 tablet (0.35 mg) by mouth daily   Quantity:  84 tablet   Refills:  3            Where to get your medicines      These medications were sent to [facility] - [place], [place] - [address]  AT [address] [facility]  [address] [place] [address]     Phone:  [phone]     norethindrone 0.35 MG per tablet                Primary Care Provider Office Phone # Fax #    Isac Fatima [phone]       [address] [address]        Equal Access to Services     [facility] AH: Hadii ethan quiroz hadasho Soshannaali, waaxda luqadaha, qaybta kaalmada adeegyada, gianfranco wilson. So [facility] [phone].    ATENCIÓN: Si habla español, tiene a lin disposición servicios gratuitos de asistencia lingüística. Lucyame al [phone].    We comply with applicable federal civil rights laws and [place] laws. We do not discriminate on the basis of race, color, national origin, age, disability, sex, sexual orientation, or gender identity.            Thank you!     Thank you for choosing [facility]  for your care. Our goal is always to provide you with excellent care. Hearing back from our patients is one way we can continue to improve our services. Please take a few minutes to complete the written survey that you may receive in the mail after your visit with us. Thank you!             Your Updated  Medication List - Protect others around you: Learn how to safely use, store and throw away your medicines at www.disposemymeds.org.          This list is accurate as of 9/28/18  2:58 PM.  Always use your most recent med list.                   Brand Name Dispense Instructions for use Diagnosis    hydrocortisone 2.5 % cream    ANUSOL-HC    30 g    Place rectally 2 times daily as needed for hemorrhoids    High-risk pregnancy, elderly multigravida, unspecified trimester, External hemorrhoids       loratadine 10 MG tablet    CLARITIN    90 tablet    Take 1 tablet by mouth daily.    Allergic rhinitis, cause unspecified       norethindrone 0.35 MG per tablet    MICRONOR    84 tablet    Take 1 tablet (0.35 mg) by mouth daily    OCP (oral contraceptive pills) initiation       prenatal multivitamin plus iron 27-0.8 MG Tabs per tablet      Take 1 tablet by mouth daily        vitamin D 400 units capsule      Take 1 capsule by mouth daily.

## 2018-09-29 ASSESSMENT — PATIENT HEALTH QUESTIONNAIRE - PHQ9: SUM OF ALL RESPONSES TO PHQ QUESTIONS 1-9: 0

## 2018-09-29 ASSESSMENT — ANXIETY QUESTIONNAIRES: GAD7 TOTAL SCORE: 0

## 2019-02-26 ENCOUNTER — TELEPHONE (OUTPATIENT)
Dept: OPHTHALMOLOGY | Facility: CLINIC | Age: 39
End: 2019-02-26

## 2019-02-28 ENCOUNTER — OFFICE VISIT (OUTPATIENT)
Dept: OPHTHALMOLOGY | Facility: CLINIC | Age: 39
End: 2019-02-28
Payer: COMMERCIAL

## 2019-02-28 DIAGNOSIS — H52.222 REGULAR ASTIGMATISM OF LEFT EYE: ICD-10-CM

## 2019-02-28 DIAGNOSIS — H52.13 MYOPIA OF BOTH EYES: Primary | ICD-10-CM

## 2019-02-28 ASSESSMENT — EXTERNAL EXAM - LEFT EYE: OS_EXAM: NORMAL

## 2019-02-28 ASSESSMENT — SLIT LAMP EXAM - LIDS
COMMENTS: NORMAL
COMMENTS: NORMAL

## 2019-02-28 ASSESSMENT — REFRACTION_CURRENTRX
OS_DIAMETER: 14.1
OD_BRAND: DAILIES TOTAL 1
OD_DIAMETER: 14.1
OD_SPHERE: -3.00
OS_BRAND: DAILIES TOTAL 1
OD_BASECURVE: 8.5
OS_BASECURVE: 8.5
OS_SPHERE: -2.75

## 2019-02-28 ASSESSMENT — CUP TO DISC RATIO
OD_RATIO: 0.25
OS_RATIO: 0.25

## 2019-02-28 ASSESSMENT — REFRACTION_MANIFEST
OS_CYLINDER: +0.50
OS_SPHERE: -3.00
OD_SPHERE: -3.00
OS_AXIS: 074
OD_CYLINDER: SPHERE

## 2019-02-28 ASSESSMENT — VISUAL ACUITY
CORRECTION_TYPE: GLASSES
OS_CC: J1+
OD_CC: J1+
OS_CC: 20/25
METHOD: SNELLEN - LINEAR
OS_CC+: -2
OD_CC: 20/20

## 2019-02-28 ASSESSMENT — CONF VISUAL FIELD
METHOD: COUNTING FINGERS
OS_NORMAL: 1
OD_NORMAL: 1

## 2019-02-28 ASSESSMENT — REFRACTION_WEARINGRX
OS_SPHERE: -3.00
OD_CYLINDER: +0.50
SPECS_TYPE: SVL
OS_CYLINDER: +0.75
OS_AXIS: 065
OD_SPHERE: -3.00
OD_AXIS: 090

## 2019-02-28 ASSESSMENT — EXTERNAL EXAM - RIGHT EYE: OD_EXAM: NORMAL

## 2019-02-28 ASSESSMENT — TONOMETRY
OS_IOP_MMHG: 17
OD_IOP_MMHG: 19
IOP_METHOD: ICARE

## 2019-02-28 ASSESSMENT — REFRACTION
OS_SPHERE: -3.25
OS_AXIS: 074
OS_CYLINDER: +0.75

## 2019-02-28 NOTE — PROGRESS NOTES
A/P  1.) Myopia each eye, Astigmatism left eye  -Fairly stable spec Rx, updated today  -Appreciates cyl in left eye, does not like spherical lens  -Order trials with cyl left eye for pt to compare. Okay to order if working well  -Dilated ocular health unremarkable each eye. Reviewed flashes/floaters and need for stat eye eval should they occur    Monitor 1-2 year routine, sooner prn    I have confirmed the patient's CC, HPI and reviewed Past Medical History, Past Surgical History, Social History, Family History, Problem List, Medication List and agree with Tech note.     Anneliese Sr, OD FAAO [facility]

## 2019-02-28 NOTE — NURSING NOTE
Chief Complaints and History of Present Illnesses   Patient presents with     Annual Eye Exam     Contact Lens Evaluation     Chief Complaint(s) and History of Present Illness(es)     Annual Eye Exam     Laterality: right eye    Quality: blurred vision    Course: gradually worsening    Treatments tried: artificial tears    Pain scale: 0/10              Contact Lens Evaluation     Laterality: both eyes              Comments     Pt states has noticed more trouble with vision the right eye, feels peripheral vision is worse. Pt also out of contacts. No pain. In eyes. AT PRN each eye.    Poli Crowder COT 8:37 AM February 28, 2019

## 2019-04-09 ENCOUNTER — OFFICE VISIT (OUTPATIENT)
Dept: OBGYN | Facility: CLINIC | Age: 39
End: 2019-04-09
Attending: ADVANCED PRACTICE MIDWIFE
Payer: COMMERCIAL

## 2019-04-09 VITALS
HEIGHT: 64 IN | SYSTOLIC BLOOD PRESSURE: 114 MMHG | WEIGHT: 160.5 LBS | BODY MASS INDEX: 27.4 KG/M2 | HEART RATE: 69 BPM | DIASTOLIC BLOOD PRESSURE: 76 MMHG

## 2019-04-09 DIAGNOSIS — Z30.430 ENCOUNTER FOR INSERTION OF INTRAUTERINE CONTRACEPTIVE DEVICE: Primary | ICD-10-CM

## 2019-04-09 PROCEDURE — G0463 HOSPITAL OUTPT CLINIC VISIT: HCPCS | Mod: ZF

## 2019-04-09 PROCEDURE — 58300 INSERT INTRAUTERINE DEVICE: CPT | Mod: ZF | Performed by: ADVANCED PRACTICE MIDWIFE

## 2019-04-09 ASSESSMENT — MIFFLIN-ST. JEOR: SCORE: 1403.02

## 2019-04-09 ASSESSMENT — PAIN SCALES - GENERAL: PAINLEVEL: NO PAIN (0)

## 2019-04-09 NOTE — PATIENT INSTRUCTIONS
Patient Education     Levonorgestrel intrauterine device (IUD)  Brand Names: Kyleena, LILETTA, Mirena, Chrau  What is this medicine?  LEVONORGESTREL IUD (ARISTEO tellezl) is a contraceptive (birth control) device. The device is placed inside the uterus by a healthcare professional. It is used to prevent pregnancy. This device can also be used to treat heavy bleeding that occurs during your period.  How should I use this medicine?  This device is placed inside the uterus by a health care professional.  Talk to your pediatrician regarding the use of this medicine in children. Special care may be needed.  What side effects may I notice from receiving this medicine?  Side effects that you should report to your doctor or health care professional as soon as possible:    allergic reactions like skin rash, itching or hives, swelling of the face, lips, or tongue    fever, flu-like symptoms    genital sores    high blood pressure    no menstrual period for 6 weeks during use    pain, swelling, warmth in the leg    pelvic pain or tenderness    severe or sudden headache    signs of pregnancy    stomach cramping    sudden shortness of breath    trouble with balance, talking, or walking    unusual vaginal bleeding, discharge    yellowing of the eyes or skin  Side effects that usually do not require medical attention (report to your doctor or health care professional if they continue or are bothersome):    acne    breast pain    change in sex drive or performance    changes in weight    cramping, dizziness, or faintness while the device is being inserted    headache    irregular menstrual bleeding within first 3 to 6 months of use    nausea  What may interact with this medicine?  Do not take this medicine with any of the following medications:    amprenavir    bosentan    fosamprenavir  This medicine may also interact with the following medications:    aprepitant    armodafinil    barbiturate medicines for inducing sleep or  treating seizures    bexarotene    boceprevir    griseofulvin    medicines to treat seizures like carbamazepine, ethotoin, felbamate, oxcarbazepine, phenytoin, topiramate    modafinil    pioglitazone    rifabutin    rifampin    rifapentine    some medicines to treat HIV infection like atazanavir, efavirenz, indinavir, lopinavir, nelfinavir, tipranavir, ritonavir    [place]'s wort    warfarin  What if I miss a dose?  This does not apply. Depending on the brand of device you have inserted, the device will need to be replaced every 3 to 5 years if you wish to continue using this type of birth control.  Where should I keep my medicine?  This does not apply.  What should I tell my health care provider before I take this medicine?  They need to know if you have any of these conditions:    abnormal Pap smear    cancer of the breast, uterus, or cervix    diabetes    endometritis    genital or pelvic infection now or in the past    have more than one sexual partner or your partner has more than one partner    heart disease    history of an ectopic or tubal pregnancy    immune system problems    IUD in place    liver disease or tumor    problems with blood clots or take blood-thinners    seizures    use intravenous drugs    uterus of unusual shape    vaginal bleeding that has not been explained    an unusual or allergic reaction to levonorgestrel, other hormones, silicone, or polyethylene, medicines, foods, dyes, or preservatives    pregnant or trying to get pregnant    breast-feeding  What should I watch for while using this medicine?  Visit your doctor or health care professional for regular check ups. See your doctor if you or your partner has sexual contact with others, becomes HIV positive, or gets a sexual transmitted disease.  This product does not protect you against HIV infection (AIDS) or other sexually transmitted diseases.  You can check the placement of the IUD yourself by reaching up to the top of your vagina  with clean fingers to feel the threads. Do not pull on the threads. It is a good habit to check placement after each menstrual period. Call your doctor right away if you feel more of the IUD than just the threads or if you cannot feel the threads at all.  The IUD may come out by itself. You may become pregnant if the device comes out. If you notice that the IUD has come out use a backup birth control method like condoms and call your health care provider.  Using tampons will not change the position of the IUD and are okay to use during your period.  This IUD can be safely scanned with magnetic resonance imaging (MRI) only under specific conditions. Before you have an MRI, tell your healthcare provider that you have an IUD in place, and which type of IUD you have in place.  NOTE:This sheet is a summary. It may not cover all possible information. If you have questions about this medicine, talk to your doctor, pharmacist, or health care provider. Copyright  2018 [employer]           IUD information:     Benefits: The IUD can be 97-99% effective when carefully following directions regarding use. It can be more effective if used with additional contraception. IUD containing progestin may decrease menstrual flow and menstrual cramping.     Risks/Side Effects: include but are not limited to spotting, bleeding, hemorrhage, or anemia: cramping or pain: partial or complete expulsion of device; lost IUD strings; uterine or cervical perforation; embedding of IUD in the uterine wall; increased risk of pelvic inflammatory disease. Women who become pregnant with an IUD in place are at a higher risk for ectopic pregnancy should a pregnancy occur with an IUD in situ. There is a higher rate of miscarriage when pregnancy occurs with IUD in place.    Warning signs: Please call clinic if you have abnormal spotting or heavy bleeding, abdominal pain, dyspareunia, fever, chills, flu like symptoms, or unable to locate strings of IUD, or  strings are longer or shorter than expected.    You are encouraged to use condoms for prevention of STD. You may also need back up contraception for 7 days with your IUD. You may use pain medications (ibuprofen) as needed for mild to moderate pain. Please follow-up in clinic in 4-6 weeks for IUD string check if unable to find strings or as directed by provider.

## 2019-04-09 NOTE — LETTER
"2019       RE: Patricia Levi  [address] [place] [address]     Dear Colleague,    Thank you for referring your patient, Patricia Levi, to the [facility] at [facility]. Please see a copy of my visit note below.      IUD Insertion:  CONSULT:    Is a pregnancy test required: Yes.  Was it positive or negative?  Negative  Was a consent obtained?  Yes    Subjective: Patricia Levi is a 38 year old  presents for IUD and desires Mirena type IUD.    Patient has been given the opportunity to ask questions about all forms of birth control, including all options appropriate for Patricia Levi. Discussed that no method of birth control, except abstinence is 100% effective against pregnancy or sexually transmitted infection.     Patricia Levi understands she may have the IUD removed at any time. IUD should be removed by a health care provider.    The entire insertion procedure was reviewed with the patient, including care after placement.    Patient's last menstrual period was 2019. . No allergy to betadine or shellfish. Patient declines STD screening  No results found for: HCG      /76   Pulse 69   Ht 1.626 m (5' 4\")   Wt 72.8 kg (160 lb 8 oz)   LMP 2019   BMI 27.55 kg/m       Pelvic Exam:   EG/BUS: normal genital architecture without lesions, erythema or abnormal secretions.   Vagina: moist, pink, rugae with physiologic discharge and secretions  Cervix: parous no lesions and pink, moist, closed, without lesion or CMT  Uterus: position, mobile, no pain  Adnexa: within normal limits and no masses, nodularity, tenderness    PROCEDURE NOTE: -- IUD Insertion    Reason for Insertion: contraception    Premedicated with ibuprofen.  Under sterile technique, cervix was visualized with speculum and prepped with Betadine solution swab x 3. Tenaculum was placed for stability. The uterus was gently straightened and sounded to 8.5 cm. IUD " prepared for placement, and IUD inserted according to 's instructions without difficulty or significant resitance, and deployed at the fundus. The strings were visualized and trimmed to 3.0 cm from the external os. Tenaculum was removed and hemostasis noted. Speculum removed.  Patient tolerated procedure well.    Lot # [id number]  Exp: 9/2021    EBL: minimal    Complications: none    ASSESSMENT:     ICD-10-CM    1. Encounter for insertion of intrauterine contraceptive device Z30.430 levonorgestrel (MIRENA) 20 MCG/24HR IUD 20 mcg     INSERTION INTRAUTERINE DEVICE        PLAN:    Given 's handouts, including when to have IUD removed, list of danger s/sx, side effects and follow up recommended. Encouraged condom use for prevention of STD. Back up contraception advised for 7 days if progestin method. Advised to call for any fever, for prolonged or severe pain or bleeding, abnormal vaginal discharge, or unable to palpate strings. She was advised to use pain medications (ibuprofen) as needed for mild to moderate pain. Advised to follow-up in clinic in 4-6 weeks for IUD string check if unable to find strings or as directed by provider.     ESTEE Hussein CNM

## 2019-04-10 NOTE — PROGRESS NOTES
"  IUD Insertion:  CONSULT:    Is a pregnancy test required: Yes.  Was it positive or negative?  Negative  Was a consent obtained?  Yes    Subjective: Patricia Levi is a 38 year old  presents for IUD and desires Mirena type IUD.    Patient has been given the opportunity to ask questions about all forms of birth control, including all options appropriate for Patricia Levi. Discussed that no method of birth control, except abstinence is 100% effective against pregnancy or sexually transmitted infection.     Patricia Levi understands she may have the IUD removed at any time. IUD should be removed by a health care provider.    The entire insertion procedure was reviewed with the patient, including care after placement.    Patient's last menstrual period was 2019. . No allergy to betadine or shellfish. Patient declines STD screening  No results found for: HCG      /76   Pulse 69   Ht 1.626 m (5' 4\")   Wt 72.8 kg (160 lb 8 oz)   LMP 2019   BMI 27.55 kg/m      Pelvic Exam:   EG/BUS: normal genital architecture without lesions, erythema or abnormal secretions.   Vagina: moist, pink, rugae with physiologic discharge and secretions  Cervix: parous no lesions and pink, moist, closed, without lesion or CMT  Uterus: position, mobile, no pain  Adnexa: within normal limits and no masses, nodularity, tenderness    PROCEDURE NOTE: -- IUD Insertion    Reason for Insertion: contraception    Premedicated with ibuprofen.  Under sterile technique, cervix was visualized with speculum and prepped with Betadine solution swab x 3. Tenaculum was placed for stability. The uterus was gently straightened and sounded to 8.5 cm. IUD prepared for placement, and IUD inserted according to 's instructions without difficulty or significant resitance, and deployed at the fundus. The strings were visualized and trimmed to 3.0 cm from the external os. Tenaculum was removed and hemostasis noted. Speculum removed.  " Patient tolerated procedure well.    Lot # [id number]  Exp: 9/2021    EBL: minimal    Complications: none    ASSESSMENT:     ICD-10-CM    1. Encounter for insertion of intrauterine contraceptive device Z30.430 levonorgestrel (MIRENA) 20 MCG/24HR IUD 20 mcg     INSERTION INTRAUTERINE DEVICE        PLAN:    Given 's handouts, including when to have IUD removed, list of danger s/sx, side effects and follow up recommended. Encouraged condom use for prevention of STD. Back up contraception advised for 7 days if progestin method. Advised to call for any fever, for prolonged or severe pain or bleeding, abnormal vaginal discharge, or unable to palpate strings. She was advised to use pain medications (ibuprofen) as needed for mild to moderate pain. Advised to follow-up in clinic in 4-6 weeks for IUD string check if unable to find strings or as directed by provider.     ESTEE Hussein CNM

## 2019-05-16 ENCOUNTER — HEALTH MAINTENANCE LETTER (OUTPATIENT)
Age: 39
End: 2019-05-16

## 2019-09-29 ENCOUNTER — HEALTH MAINTENANCE LETTER (OUTPATIENT)
Age: 39
End: 2019-09-29

## 2019-12-05 ENCOUNTER — TELEPHONE (OUTPATIENT)
Dept: OPHTHALMOLOGY | Facility: CLINIC | Age: 39
End: 2019-12-05

## 2019-12-05 NOTE — TELEPHONE ENCOUNTER
[facility] Call Center    Phone Message    May a detailed message be left on voicemail: yes    Reason for Call: Order(s): RX for Glasses  Reason for requested: Please Fax to [facility] [phone]  Date needed: ASAP  Provider name: Dr. Sr  Last clinic visit: 2.28.2019      Action Taken: Message routed to:  [facility] ([facility] eye

## 2020-05-02 NOTE — PROGRESS NOTES
"Subjective:     37 year old  at 20w1d presents for a routine prenatal appointment.      Denies vaginal bleeding or leakage of fluid.  Denies contractions or cramping. + fetal movement.       No HA, visual changes, RUQ or epigastric pain.   The patient presents with the following concerns:  Feeling well overall  Level II US  Results reviewed normal scan. Having a boy!     Objective:  Vitals:    18 0958   BP: 112/73   Pulse: 77   Weight: 82.6 kg (182 lb)   Height: 1.626 m (5' 4.02\")   See OB flowsheet    Assessment/Plan:  Encounter Diagnosis   Name Primary?     High-risk pregnancy, elderly multigravida, , [facility] CNM Yes     - Reviewed total weight gain, encouraged continued healthy diet and exercise.      - Reviewed why/how to contact provider.   - Patient education/orders or handouts today: PTL signs/symptoms, Fetal movement and Plan for EOB visit w labs  - Return to clinic in 4-6 weeks and prn if questions or concerns.  "
ABSCESS

## 2020-05-12 ENCOUNTER — APPOINTMENT (OUTPATIENT)
Dept: LAB | Facility: CLINIC | Age: 40
End: 2020-05-12
Payer: COMMERCIAL

## 2020-07-30 ENCOUNTER — VIRTUAL VISIT (OUTPATIENT)
Dept: FAMILY MEDICINE | Facility: OTHER | Age: 40
End: 2020-07-30
Payer: COMMERCIAL

## 2020-07-30 DIAGNOSIS — Z20.822 ENCOUNTER FOR LABORATORY TESTING FOR COVID-19 VIRUS: Primary | ICD-10-CM

## 2020-07-30 PROCEDURE — U0003 INFECTIOUS AGENT DETECTION BY NUCLEIC ACID (DNA OR RNA); SEVERE ACUTE RESPIRATORY SYNDROME CORONAVIRUS 2 (SARS-COV-2) (CORONAVIRUS DISEASE [COVID-19]), AMPLIFIED PROBE TECHNIQUE, MAKING USE OF HIGH THROUGHPUT TECHNOLOGIES AS DESCRIBED BY CMS-2020-01-R: HCPCS | Performed by: FAMILY MEDICINE

## 2020-07-30 PROCEDURE — 99421 OL DIG E/M SVC 5-10 MIN: CPT | Performed by: PHYSICIAN ASSISTANT

## 2020-07-30 NOTE — PROGRESS NOTES
"Date: 2020 10:46:34  Clinician: Félix Rg  Clinician NPI: [id number]  Patient: Patricia Levi  Patient : 1980  Patient Address: [address] [place] [address]  Patient Phone: [phone]  Visit Protocol: URI  Patient Summary:  Patricia is a 39 year old ( : 1980 ) female who initiated a Visit for COVID-19 (Coronavirus) evaluation and screening. When asked the question \"Please sign me up to receive news, health information and promotions from [facility].\", Patricia responded \"No\".    Patricia states her symptoms started today.   Her symptoms consist of a sore throat.   Symptom details   Sore throat: Patricia reports having mild throat pain (1-3 on a 10 point pain scale), does not have exudate on her tonsils, and can swallow liquids. She is not sure if the lymph nodes in her neck are enlarged. A rash has not appeared on the skin since the sore throat started.    Patricia denies having wheezing, nausea, teeth pain, ageusia, diarrhea, myalgias, anosmia, facial pain or pressure, fever, cough, nasal congestion, vomiting, rhinitis, malaise, ear pain, headache, and chills. She also denies having recent facial or sinus surgery in the past 60 days and taking antibiotic medication in the past month. She is not experiencing dyspnea.   Precipitating events  Within the past week, Patricia has not been exposed to someone with strep throat.   Pertinent COVID-19 (Coronavirus) information  In the past 14 days, Patricia has not worked in a congregate living setting.   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 She does not live with a healthcare worker.   Patricia has not had a close contact with a laboratory-confirmed COVID-19 patient " within 14 days of symptom onset.   Pertinent medical history  Patricia does not get yeast infections when she takes antibiotics.   Patricia does not need a return to work/school note.   Weight: 170 lbs   Patricia does not smoke or use smokeless tobacco.   She denies pregnancy and denies breastfeeding. She has menstruated in the past month.   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 I had worked in close contact with him, though not for a long period of time the days before his symptoms started.   Weight: 170 lbs    MEDICATIONS: No current medications, ALLERGIES: Penicillins  Clinician Response:  Dear Patricia,   Your symptoms show that you may have coronavirus (COVID-19). This illness can cause fever, cough and trouble breathing. Many people get a mild case and get better on their own. Some people can get very sick.  What should I do?  We would like to test you for this virus.   1. Please call [phone] to schedule your visit. Explain that you were referred by [facility] to have a COVID-19 test. Be ready to share your [facility] visit ID number.  The following will serve as your written order for this COVID Test, ordered by me, for the indication of suspected COVID [Z20.828]: The test will be ordered in [employer], our electronic health record, after you are scheduled. It will show as ordered and authorized by Suleiman Cuenca MD.  Order: COVID-19 (Coronavirus) PCR for SYMPTOMATIC testing from [facility].      2. When it's time for your COVID test:  Stay at least 6 feet away from others. (If someone will drive you to your test, stay in the backseat, as far away from the  as you can.)   Cover your mouth and nose with a mask, tissue or washcloth.  Go straight to the testing site. Don't make any stops on the way there or back.      3.Starting now: Stay home and away from others (self-isolate) until:   You've had no fever---and no  "medicine that reduces fever---for 3 full days (72 hours). And...   Your other symptoms have gotten better. For example, your cough or breathing has improved. And...   At least 10 days have passed since your symptoms started.       During this time, don't leave the house except for testing or medical care.   Stay in your own room, even for meals. Use your own bathroom if you can.   Stay away from others in your home. No hugging, kissing or shaking hands. No visitors.  Don't go to work, school or anywhere else.    Clean \"high touch\" surfaces often (doorknobs, counters, handles, etc.). Use a household cleaning spray or wipes. You'll find a full list of  on the EPA website: www.epa.gov/pesticide-registration/list-n-disinfectants-use-against-sars-cov-2.   Cover your mouth and nose with a mask, tissue or washcloth to avoid spreading germs.  Wash your hands and face often. Use soap and water.  Caregivers in these groups are at risk for severe illness due to COVID-19:  o People 65 years and older  o People who live in a nursing home or long-term care facility  o People with chronic disease (lung, heart, cancer, diabetes, kidney, liver, immunologic)  o People who have a weakened immune system, including those who:   Are in cancer treatment  Take medicine that weakens the immune system, such as corticosteroids  Had a bone marrow or organ transplant  Have an immune deficiency  Have poorly controlled HIV or AIDS  Are obese (body mass index of 40 or higher)  Smoke regularly   o Caregivers should wear gloves while washing dishes, handling laundry and cleaning bedrooms and bathrooms.  o Use caution when washing and drying laundry: Don't shake dirty laundry, and use the warmest water setting that you can.  o For more tips, go to www.cdc.gov/coronavirus/2019-ncov/downloads/10Things.pdf.    4.Sign up for [employer]. We know it's scary to hear that you might have COVID-19. We want to track your symptoms to make sure you're " okay over the next 2 weeks. Please look for an email from [employer]---this is a free, online program that we'll use to keep in touch. To sign up, follow the link in the email. Learn more at [email or web address]  How can I take care of myself?   Get lots of rest. Drink extra fluids (unless a doctor has told you not to).   Take Tylenol (acetaminophen) for fever or pain. If you have liver or kidney problems, ask your family doctor if it's okay to take Tylenol.   Adults can take either:    650 mg (two 325 mg pills) every 4 to 6 hours, or...   1,000 mg (two 500 mg pills) every 8 hours as needed.    Note: Don't take more than 3,000 mg in one day. Acetaminophen is found in many medicines (both prescribed and over-the-counter medicines). Read all labels to be sure you don't take too much.   For children, check the Tylenol bottle for the right dose. The dose is based on the child's age or weight.    If you have other health problems (like cancer, heart failure, an organ transplant or severe kidney disease): Call your specialty clinic if you don't feel better in the next 2 days.       Know when to call 911. Emergency warning signs include:    Trouble breathing or shortness of breath Pain or pressure in the chest that doesn't go away Feeling confused like you haven't felt before, or not being able to wake up Bluish-colored lips or face.  Where can I get more information?   [facility] -- About COVID-19: [email or web address]   CDC -- What to Do If You're Sick: www.cdc.gov/coronavirus/2019-ncov/about/steps-when-sick.html   CDC -- Ending Home Isolation: www.cdc.gov/coronavirus/2019-ncov/hcp/disposition-in-home-patients.html   CDC -- Caring for Someone: www.cdc.gov/coronavirus/2019-ncov/if-you-are-sick/care-for-someone.html   [facility] -- Interim Guidance for Hospital Discharge to Home: www.health.[facility].mn./diseases/coronavirus/hcp/hospdischarge.pdf   [facility] clinical trials (COVID-19  research studies): clinicalaffairs.[facility]-clinical-trials    Below are the COVID-19 hotlines at the [place] [facility] ([facility]). Interpreters are available.    For health questions: Call 048-083-9402 or 1-421.633.7350 (7 a.m. to 7 p.m.) For questions about schools and childcare: Call 597-919-0549 or 1-912.615.4572 (7 a.m. to 7 p.m.)    Diagnosis: Pain in throat  Diagnosis ICD: R07.0

## 2020-08-01 LAB
SARS-COV-2 RNA SPEC QL NAA+PROBE: NOT DETECTED
SPECIMEN SOURCE: NORMAL

## 2020-10-13 ENCOUNTER — OFFICE VISIT (OUTPATIENT)
Dept: OBGYN | Facility: CLINIC | Age: 40
End: 2020-10-13
Attending: MIDWIFE
Payer: COMMERCIAL

## 2020-10-13 VITALS
DIASTOLIC BLOOD PRESSURE: 82 MMHG | WEIGHT: 169.3 LBS | HEIGHT: 64 IN | HEART RATE: 67 BPM | BODY MASS INDEX: 28.9 KG/M2 | SYSTOLIC BLOOD PRESSURE: 121 MMHG

## 2020-10-13 DIAGNOSIS — N84.1 CERVICAL POLYP: ICD-10-CM

## 2020-10-13 DIAGNOSIS — N93.9 ABNORMAL UTERINE BLEEDING: ICD-10-CM

## 2020-10-13 DIAGNOSIS — Z30.431 INTRAUTERINE DEVICE SURVEILLANCE: Primary | ICD-10-CM

## 2020-10-13 DIAGNOSIS — Z97.5 IUD (INTRAUTERINE DEVICE) IN PLACE: ICD-10-CM

## 2020-10-13 PROCEDURE — 99213 OFFICE O/P EST LOW 20 MIN: CPT | Performed by: MIDWIFE

## 2020-10-13 PROCEDURE — G0463 HOSPITAL OUTPT CLINIC VISIT: HCPCS

## 2020-10-13 RX ORDER — IBUPROFEN 600 MG/1
600 TABLET, FILM COATED ORAL 3 TIMES DAILY
Qty: 20 TABLET | Refills: 0 | Status: SHIPPED | OUTPATIENT
Start: 2020-10-13 | End: 2022-07-03

## 2020-10-13 ASSESSMENT — MIFFLIN-ST. JEOR: SCORE: 1437.94

## 2020-10-13 NOTE — PROGRESS NOTES
"Chief Complaint: IUD check  Subjective: 39 year old  presents for Mirena IUD check. IUD was placed on 2019 and which has not been verified in place previously.   Patricia reports bleeding has been monthly, light since time of IUD insertion. She now has 3 weeks of consistent, daily bleeding. Some days require panty liner only and some have been like menses, requiring changing pad 4 times.    She has not experienced cramping, except one day last week. No abnormal discharge. Pt is sexually active with one partner.  plans to get a vasectomy and then she wants to remove IUD.   History is remarkable for myomas.   ROS: 10 point ROS neg other than the symptoms noted above in the HPI.  O:   Vitals:    10/13/20 1400   BP: 121/82   BP Location: Left arm   Patient Position: Chair   Pulse: 67   Weight: 76.8 kg (169 lb 4.8 oz)   Height: 1.626 m (5' 4\")   -VS: reviewed and within normal limits   -General appearance: no acute distress, patient is comfortable   NEUROLOGICAL/PSYCHIATRIC   - Orientated x3,   -Mood and affect: : normal   PELVIC: Normal external female genitalia without lesions. Adequate perineal body, normal urethral meatus. Vagina moist and well rugated without lesions or discharge. Cervix pink, discharge or tenderness. Blood coming through cervix appeared to be from uterine source. 3cm strings visualized at 1 o'clock. Bimanual exam shows uterus is regular, mobile and nontender.     Assessment/Plan   (Z30.431) Intrauterine device surveillance  (primary encounter diagnosis)  Plan: ibuprofen (ADVIL/MOTRIN) 600 MG tablet, US         Transvaginal Non OB  (N93.9) Abnormal uterine bleeding  Plan: ibuprofen (ADVIL/MOTRIN) 600 MG tablet, US         Transvaginal Non OB  (N84.1) Cervical polyp    - Discussed cervical polyp less than 1x1cm, at 6-7 o'clock, on face of cervix, not in cervical canal. Did not bleed with stimulation.    - Discussed AUB is likely related to Mirena. Discussed options for ibuprofen or " hormonal cycle and withdrawal bleed. Pt would prefer not to use additional hormones. Reviewed NSAID dosing and rx sent for ibuprofen TID for 7 days.   - If bleeding does not resolve patient advised call clinic to discuss options and can schedule ultrasound to rule out other causes of bleeding.    - Reviewed how/why to contact provider including increased abdominal cramping, vaginal bleeding, abdominal pain, fever or flu like symptoms or if unable to locate strings on monthly check.  Over 50% of the 15 minute visit was spent in face to face counseling as above.  Patricia Levi verbalized understanding of and agreement to plan of care.    ESTEE Perla CNM

## 2020-10-13 NOTE — LETTER
"10/13/2020       RE: Patricia Levi  [address] [place] [address]     Dear Colleague,    Thank you for referring your patient, Patricia Levi, to the [facility] [place] [facility]. Please see a copy of my visit note below.    Chief Complaint: IUD check  Subjective: 39 year old  presents for Mirena IUD check. IUD was placed on 2019 and which has not been verified in place previously.   Patricia reports bleeding has been monthly, light since time of IUD insertion. She now has 3 weeks of consistent, daily bleeding. Some days require panty liner only and some have been like menses, requiring changing pad 4 times.    She has not experienced cramping, except one day last week. No abnormal discharge. Pt is sexually active with one partner.  plans to get a vasectomy and then she wants to remove IUD.   History is remarkable for myomas.   ROS: 10 point ROS neg other than the symptoms noted above in the HPI.  O:   Vitals:    10/13/20 1400   BP: 121/82   BP Location: Left arm   Patient Position: Chair   Pulse: 67   Weight: 76.8 kg (169 lb 4.8 oz)   Height: 1.626 m (5' 4\")   -VS: reviewed and within normal limits   -General appearance: no acute distress, patient is comfortable   NEUROLOGICAL/PSYCHIATRIC   - Orientated x3,   -Mood and affect: : normal   PELVIC: Normal external female genitalia without lesions. Adequate perineal body, normal urethral meatus. Vagina moist and well rugated without lesions or discharge. Cervix pink, discharge or tenderness. Blood coming through cervix appeared to be from uterine source. 3cm strings visualized at 1 o'clock. Bimanual exam shows uterus is regular, mobile and nontender.   Assessment/Plan   (Z30.431) Intrauterine device surveillance  (primary encounter diagnosis)  Plan: ibuprofen (ADVIL/MOTRIN) 600 MG tablet, US         Transvaginal Non OB  (N93.9) Abnormal uterine bleeding  Plan: ibuprofen (ADVIL/MOTRIN) " 600 MG tablet, US         Transvaginal Non OB  (N84.1) Cervical polyp  - Discussed cervical polyp less than 1x1cm, at 6-7 o'clock, on face of cervix, not in cervical canal. Did not bleed with stimulation.    - Discussed AUB is likely related to Mirena. Discussed options for ibuprofen or hormonal cycle and withdrawal bleed. Pt would prefer not to use additional hormones. Reviewed NSAID dosing and rx sent for ibuprofen TID for 7 days.   - If bleeding does not resolve patient advised call clinic to discuss options and can schedule ultrasound to rule out other causes of bleeding.    - Reviewed how/why to contact provider including increased abdominal cramping, vaginal bleeding, abdominal pain, fever or flu like symptoms or if unable to locate strings on monthly check.  Over 50% of the 15 minute visit was spent in face to face counseling as above.  Patricia Levi verbalized understanding of and agreement to plan of care.    ESTEE PerlaM

## 2020-10-21 ENCOUNTER — RESULTS ONLY (OUTPATIENT)
Dept: LAB | Age: 40
End: 2020-10-21

## 2020-10-21 ENCOUNTER — APPOINTMENT (OUTPATIENT)
Dept: FAMILY MEDICINE | Facility: CLINIC | Age: 40
End: 2020-10-21
Payer: COMMERCIAL

## 2020-10-21 LAB
SARS-COV-2 RNA SPEC QL NAA+PROBE: NORMAL
SPECIMEN SOURCE: NORMAL

## 2020-10-22 LAB
LABORATORY COMMENT REPORT: NORMAL
SARS-COV-2 RNA SPEC QL NAA+PROBE: NEGATIVE
SPECIMEN SOURCE: NORMAL

## 2021-01-14 ENCOUNTER — HEALTH MAINTENANCE LETTER (OUTPATIENT)
Age: 41
End: 2021-01-14

## 2021-08-26 ENCOUNTER — OFFICE VISIT (OUTPATIENT)
Dept: OPHTHALMOLOGY | Facility: CLINIC | Age: 41
End: 2021-08-26
Payer: COMMERCIAL

## 2021-08-26 DIAGNOSIS — H52.13 MYOPIA OF BOTH EYES WITH ASTIGMATISM: Primary | ICD-10-CM

## 2021-08-26 DIAGNOSIS — H52.203 MYOPIA OF BOTH EYES WITH ASTIGMATISM: Primary | ICD-10-CM

## 2021-08-26 PROCEDURE — 92015 DETERMINE REFRACTIVE STATE: CPT | Performed by: OPTOMETRIST

## 2021-08-26 PROCEDURE — 92002 INTRM OPH EXAM NEW PATIENT: CPT | Performed by: OPTOMETRIST

## 2021-08-26 ASSESSMENT — VISUAL ACUITY
CORRECTION_TYPE: CONTACTS
OS_CC: 20/20
OS_CC: J1+
OD_CC: 20/20
OD_CC: J1+
METHOD: SNELLEN - LINEAR

## 2021-08-26 ASSESSMENT — REFRACTION_MANIFEST
OS_AXIS: 075
OD_CYLINDER: SPHERE
OS_SPHERE: -3.50
OD_SPHERE: -3.00
OS_CYLINDER: +0.50

## 2021-08-26 ASSESSMENT — TONOMETRY
IOP_METHOD: ICARE
OD_IOP_MMHG: 18
OS_IOP_MMHG: 18

## 2021-08-26 ASSESSMENT — REFRACTION_CURRENTRX
OD_SPHERE: -2.75
OS_DIAMETER: 14.3
OS_BASECURVE: 8.5
OS_SPHERE: -2.50
OS_AXIS: 170
OD_BASECURVE: 8.5
OD_BRAND: ACUVUE OASYS 1 DAY
OS_CYLINDER: -0.75
OS_BRAND: ACUVUE OASYS 1 DAY ASTIGMATISM
OD_DIAMETER: 14.3

## 2021-08-26 ASSESSMENT — CONF VISUAL FIELD
METHOD: COUNTING FINGERS
OD_NORMAL: 1
OS_NORMAL: 1

## 2021-08-26 ASSESSMENT — CUP TO DISC RATIO
OS_RATIO: 0.25
OD_RATIO: 0.25

## 2021-08-26 ASSESSMENT — SLIT LAMP EXAM - LIDS
COMMENTS: NORMAL
COMMENTS: NORMAL

## 2021-08-26 ASSESSMENT — EXTERNAL EXAM - RIGHT EYE: OD_EXAM: NORMAL

## 2021-08-26 ASSESSMENT — EXTERNAL EXAM - LEFT EYE: OS_EXAM: NORMAL

## 2021-08-26 NOTE — NURSING NOTE
Chief Complaints and History of Present Illnesses   Patient presents with     Contact Lens Evaluation     Chief Complaint(s) and History of Present Illness(es)     Contact Lens Evaluation     Laterality: both eyes    Associated symptoms: floaters (longstanding no changes per pt).  Negative for eye pain, dryness, tearing and flashes    Treatments tried: no treatments    Pain scale: 0/10              Comments     Pt notes that she needs an upgrade in CTL, feels it is blurred at both distance and near.     Delilah Keith COT August 26, 2021 8:20 AM

## 2021-08-26 NOTE — PROGRESS NOTES
A/P  1.) Myopia/Astigmatism each eye  -Very mild change in spec Rx, updated today. Notices vision clearer in left CL compared to right. On trial lens does appreciate cyl right eye CL  -Rec trialing cyl in CL's. Will order trials and mail to pt.   -Undilated ocular health unremarkable each eye     Monitor 1 year dilated eye exam. Okay to order CL's if working well, otherwise can contact clinic with CL concerns    I have confirmed the patient's CC, HPI and reviewed Past Medical History, Past Surgical History, Social History, Family History, Problem List, Medication List and agree with Tech note.     Anneliese Sr, OD FAAO FSLS

## 2021-09-03 ENCOUNTER — DOCUMENTATION ONLY (OUTPATIENT)
Dept: OPTOMETRY | Facility: CLINIC | Age: 41
End: 2021-09-03

## 2021-10-24 ENCOUNTER — HEALTH MAINTENANCE LETTER (OUTPATIENT)
Age: 41
End: 2021-10-24

## 2021-11-12 ENCOUNTER — E-VISIT (OUTPATIENT)
Dept: URGENT CARE | Facility: URGENT CARE | Age: 41
End: 2021-11-12
Payer: COMMERCIAL

## 2021-11-12 DIAGNOSIS — Z20.822 SUSPECTED COVID-19 VIRUS INFECTION: Primary | ICD-10-CM

## 2021-11-12 PROCEDURE — 99421 OL DIG E/M SVC 5-10 MIN: CPT | Performed by: PHYSICIAN ASSISTANT

## 2021-11-12 NOTE — PATIENT INSTRUCTIONS
Dear Patricia Levi,    Your symptoms show that you may have coronavirus (COVID-19). This illness can cause fever, cough and trouble breathing. Many people get a mild case and get better on their own. Some people can get very sick.    Will I be tested for COVID-19?  We would like to test you for Covid-19 virus. I have placed orders for this test.     For all employees or close contacts (except [place] and [employer] - see below), go to your [employer] home page and scroll down to the section that says  You have an appointment that needs to be scheduled  and click the large green button that says  Schedule Now  and follow the steps to find the next available opening.     If you are unable to complete these steps or if you cannot find any available times, please call [phone] to schedule employee testing.     [place] employees or close contacts, please call [phone].   [place] ([employer]) employees or close contacts call [phone].    Return to work/school/ guidance:  Please let your workplace manager and staffing office know when your quarantine ends     We can t give you an exact date as it depends on the above. You can calculate this on your own or work with your manager/staffing office to calculate this. (For example if you were exposed on 10/4, you would have to quarantine for 14 full days. That would be through 10/18. You could return on 10/19.)      If you receive a positive COVID-19 test result, follow the guidance of the those who are giving you the results. Usually the return to work is 10 (or in some cases 20 days from symptom onset.) If you work at [employer], you must also be cleared by Employee Occupational Health and Safety to return to work.        If you receive a negative COVID-19 test result and did not have a high risk exposure to someone with a known positive COVID-19 test, you can return to work once you're free of fever for 24 hours without fever-reducing medication and  your symptoms are improving or resolved.      If you receive a negative COVID-19 test and If you had a high risk exposure to someone who has tested positive for COVID-19 then you can return to work 14 days after your last contact with the positive individual    Note: If you have ongoing exposure to the covid positive person, this quarantine period may be more than 14 days. (For example, if you are continued to be exposed to your child who tested positive and cannot isolate from them, then the quarantine of 7-14 days can't start until your child is no longer contagious. This is typically 10 days from onset of the child's symptoms. So the total duration may be 17-24 days in this case.)    Sign up for [employer].   We know it's scary to hear that you might have COVID-19. We want to track your symptoms to make sure you're okay over the next 2 weeks. Please look for an email from [employer]--this is a free, online program that we'll use to keep in touch. To sign up, follow the link in the email you will receive. Learn more at [email or web address]    How can I take care of myself?    Get lots of rest. Drink extra fluids (unless a doctor has told you not to)    Take Tylenol (acetaminophen) or ibuprofen for fever or pain. If you have liver or kidney problems, ask your family doctor if it's okay to take Tylenol o ibuprofen    If you have other health problems (like cancer, heart failure, an organ transplant or severe kidney disease): Call your specialty clinic if you don't feel better in the next 2 days.    Know when to call 911. Emergency warning signs include:  o Trouble breathing or shortness of breath  o Pain or pressure in the chest that doesn't go away  o Feeling confused like you haven't felt before, or not being able to wake up  o Bluish-colored lips or face    Where can I get more information?   [facility] - About COVID-19:   [email or web address]    CDC - What to Do If You're Sick:    www.cdc.gov/coronavirus/2019-ncov/about/steps-when-sick.html

## 2021-11-13 ENCOUNTER — LAB (OUTPATIENT)
Dept: URGENT CARE | Facility: URGENT CARE | Age: 41
End: 2021-11-13
Payer: COMMERCIAL

## 2021-11-13 DIAGNOSIS — Z20.822 SUSPECTED COVID-19 VIRUS INFECTION: ICD-10-CM

## 2021-11-13 LAB — SARS-COV-2 RNA RESP QL NAA+PROBE: NEGATIVE

## 2021-11-13 PROCEDURE — U0003 INFECTIOUS AGENT DETECTION BY NUCLEIC ACID (DNA OR RNA); SEVERE ACUTE RESPIRATORY SYNDROME CORONAVIRUS 2 (SARS-COV-2) (CORONAVIRUS DISEASE [COVID-19]), AMPLIFIED PROBE TECHNIQUE, MAKING USE OF HIGH THROUGHPUT TECHNOLOGIES AS DESCRIBED BY CMS-2020-01-R: HCPCS

## 2021-11-13 PROCEDURE — U0005 INFEC AGEN DETEC AMPLI PROBE: HCPCS

## 2021-11-23 ENCOUNTER — E-VISIT (OUTPATIENT)
Dept: URGENT CARE | Facility: URGENT CARE | Age: 41
End: 2021-11-23
Payer: COMMERCIAL

## 2021-11-23 DIAGNOSIS — J01.90 ACUTE SINUSITIS WITH SYMPTOMS > 10 DAYS: Primary | ICD-10-CM

## 2021-11-23 PROCEDURE — 99421 OL DIG E/M SVC 5-10 MIN: CPT | Performed by: PHYSICIAN ASSISTANT

## 2021-11-23 RX ORDER — DOXYCYCLINE HYCLATE 100 MG
100 TABLET ORAL 2 TIMES DAILY
Qty: 14 TABLET | Refills: 0 | Status: SHIPPED | OUTPATIENT
Start: 2021-11-23 | End: 2021-11-30

## 2021-11-23 NOTE — PATIENT INSTRUCTIONS
Sinusitis (Antibiotic Treatment)    The sinuses are air-filled spaces within the bones of the face. They connect to the inside of the nose. Sinusitis is an inflammation of the tissue that lines the sinuses. Sinusitis can occur during a cold. It can also happen due to allergies to pollens and other particles in the air. Sinusitis can cause symptoms of sinus congestion and a feeling of fullness. A sinus infection causes fever, headache, and facial pain. There is often green or yellow fluid draining from the nose or into the back of the throat (post-nasal drip). You have been given antibiotics to treat this condition.   Home care    Take the full course of antibiotics as instructed. Don't stop taking them, even when you feel better.    Drink plenty of water, hot tea, and other liquids as directed by the healthcare provider. This may help thin nasal mucus. It also may help your sinuses drain fluids.    Heat may help soothe painful areas of your face. Use a towel soaked in hot water. Or,  the shower and direct the warm spray onto your face. Using a vaporizer along with a menthol rub at night may also help soothe symptoms.     An expectorant with guaifenesin may help thin nasal mucus and help your sinuses drain fluids. Talk with your provider or pharmacists before taking an over-the-counter (OTC) medicine if you have any questions about it or its side effects..    You can use an OTC decongestant, unless a similar medicine was prescribed to you. Nasal sprays work the fastest. Use one that contains phenylephrine or oxymetazoline. First blow your nose gently. Then use the spray. Don't use these medicines more often than directed on the label. If you do, your symptoms may get worse. You may also take pills that contain pseudoephedrine. Don t use products that combine multiple medicines. This is because side effects may be increased. Read labels. You can also ask the pharmacist for help. (People with high blood  pressure should not use decongestants. They can raise blood pressure.) Talk with your provider or pharmacist if you have any questions about the medicine..    OTC antihistamines may help if allergies contributed to your sinusitis. Talk with your provider or pharmacist if you have any questions about the medicine..    Don't use nasal rinses or irrigation during an acute sinus infection, unless your healthcare provider tells you to. Rinsing may spread the infection to other areas in your sinuses.    Use acetaminophen or ibuprofen to control pain, unless another pain medicine was prescribed to you. If you have chronic liver or kidney disease or ever had a stomach ulcer, talk with your healthcare provider before using these medicines. Never give aspirin to anyone under age 18 who is ill with a fever. It may cause severe liver damage.    Don't smoke. This can make symptoms worse.    Follow-up care  Follow up with your healthcare provider, or as advised.   When to seek medical advice  Call your healthcare provider if any of these occur:     Facial pain or headache that gets worse    Stiff neck    Unusual drowsiness or confusion    Swelling of your forehead or eyelids    Symptoms don't go away in 10 days    Vision problems, such as blurred or double vision    Fever of 100.4 F (38 C) or higher, or as directed by your healthcare provider  Call 911  Call 911 if any of these occur:     Seizure    Trouble breathing    Feeling dizzy or faint    Fingernails, skin or lips look blue, purple , or gray  Prevention  Here are steps you can take to help prevent an infection:     Keep good hand washing habits.    Don t have close contact with people who have sore throats, colds, or other upper respiratory infections.    Don t smoke, and stay away from secondhand smoke.    Stay up to date with of your vaccines.  [place] last reviewed this educational content on 12/1/2019 2000-2021 [employer]. All rights reserved. This  information is not intended as a substitute for professional medical care. Always follow your healthcare professional's instructions.        Dear Patricia Levi    After reviewing your responses, I've been able to diagnose you with?a sinus infection caused by bacteria.?     Based on your responses and diagnosis, I have prescribed doxycycline to treat your symptoms. I have sent this to your pharmacy.?     It is also important to stay well hydrated, get lots of rest and take over-the-counter decongestants,?tylenol?or ibuprofen if you?are able to?take those medications per your primary care provider to help relieve discomfort.?     It is important that you take?all of?your prescribed medication even if your symptoms are improving after a few doses.? Taking?all of?your medicine helps prevent the symptoms from returning.?     If your symptoms worsen, you develop severe headache, vomiting, high fever (>102), or are not improving in 7 days, please contact your primary care provider for an appointment or visit any of our convenient Walk-in Care or Urgent Care Centers to be seen which can be found on our website?here.?     Thanks again for choosing?us?as your health care partner,?   ?  America Aparicio?

## 2021-12-22 ENCOUNTER — E-VISIT (OUTPATIENT)
Dept: URGENT CARE | Facility: CLINIC | Age: 41
End: 2021-12-22
Payer: COMMERCIAL

## 2021-12-22 DIAGNOSIS — Z20.822 CLOSE EXPOSURE TO 2019 NOVEL CORONAVIRUS: Primary | ICD-10-CM

## 2021-12-22 PROCEDURE — 99421 OL DIG E/M SVC 5-10 MIN: CPT | Performed by: PHYSICIAN ASSISTANT

## 2021-12-23 ENCOUNTER — LAB (OUTPATIENT)
Dept: LAB | Facility: CLINIC | Age: 41
End: 2021-12-23
Attending: PHYSICIAN ASSISTANT
Payer: COMMERCIAL

## 2021-12-23 DIAGNOSIS — Z20.822 CLOSE EXPOSURE TO 2019 NOVEL CORONAVIRUS: ICD-10-CM

## 2021-12-23 LAB — SARS-COV-2 RNA RESP QL NAA+PROBE: NEGATIVE

## 2021-12-23 PROCEDURE — U0005 INFEC AGEN DETEC AMPLI PROBE: HCPCS

## 2021-12-23 PROCEDURE — U0003 INFECTIOUS AGENT DETECTION BY NUCLEIC ACID (DNA OR RNA); SEVERE ACUTE RESPIRATORY SYNDROME CORONAVIRUS 2 (SARS-COV-2) (CORONAVIRUS DISEASE [COVID-19]), AMPLIFIED PROBE TECHNIQUE, MAKING USE OF HIGH THROUGHPUT TECHNOLOGIES AS DESCRIBED BY CMS-2020-01-R: HCPCS

## 2021-12-23 NOTE — PATIENT INSTRUCTIONS
"  Dear Patricia Levi,    Based on your exposure to COVID-19 (coronavirus), we would like to test you for this virus. I have placed an order for this test. The best time for testing is 5-7 days after the exposure.    How to schedule:  For all employees or close contacts (except [employer] [place] and [employer] - see below), go to your [employer] home page and scroll down to the section that says  You have an appointment that needs to be scheduled  and click the large green button that says  Schedule Now  and follow the steps to find the next available opening.     If you are unable to complete these steps or if you cannot find any available times, please call [phone] to schedule employee testing.     [employer] employees or close contacts, please call [phone].   [place] ([employer]) employees or close contacts call [phone].    Return to work/school/ guidance:   For people with high risk exposures outside the home    Please let your workplace manager and staffing office know when your quarantine ends.     We can not give you an exact date as it depends on the information below. You can calculate this on your own or work with your manager/staffing office to calculate this. (For example if you were exposed on 10/4, you would have to quarantine for 14 full days. That would be through 10/18. You could return on 10/19.)    Quarantine Guidelines:  Patients (\"contacts\") who have been in close prolonged contact of an infected person(s) (within six feet for at least 15 minutes within a 24 hour period), and remain asymptomatic should enter quarantine based on the following options:    14-day quarantine period (this remains the CDC recommendation for the greatest protection against spread of COVID-19) OR    Minimum 7-day quarantine with negative RT-PCR test collected on day 5 or later OR    10-day quarantine with no test  Quarantine Guideline exceptions are as follows:    People who have been fully vaccinated do not need " to quarantine if the exposure was at least 2 weeks after the last vaccination. This includes vaccinated health care workers.    Not fully vaccinated and unvaccinated Individuals who work in health care, congregate care, or congregate living should be off work for 14 days from their last date of exposure. Community activities for this group can be resumed based on options above. Fully vaccinated individuals in this group do not need to quarantine from work after exposure.    Not fully vaccinated and unvaccinated people whose high-risk exposure was a household member should always quarantine for 14 days from their last date of exposure. Fully vaccinated people in this category do not need to quarantine.    Not fully vaccinated or unvaccinated residents of congregate care and congregate living settings should always quarantine for 14 days from their last date of exposure. Fully vaccinated residents do not need to quarantine.    Note: If you have ongoing exposure to the covid positive person, this quarantine period may be more than 14 days. (For example, if you are continued to be exposed to your child who tested positive and cannot isolate from them, then the quarantine of 7-14 days can't start until your child is no longer contagious. This is typically 10 days from onset of the child's symptoms. So the total duration may be 17-24 days in this case.)    You should continue symptom monitoring until day 14 post-exposure. If you develop signs or symptoms of COVID-19, isolate and get tested (even if you have been tested already).    How to quarantine:   Stay home and away from others. Don't go to school or anywhere else. Generally quarantine means staying home from work but there are some exceptions to this. Please contact your workplace.  No hugging, kissing or shaking hands.  Don't let anyone visit.  Cover your mouth and nose with a mask, tissue or washcloth to avoid spreading germs.  Wash your hands and face often. Use  soap and water.    What are the symptoms of COVID-19?  The most common symptoms are cough, fever and trouble breathing. Less common symptoms include headache, body aches, fatigue (feeling very tired), chills, sore throat, stuffy or runny nose, diarrhea (loose poop), loss of taste or smell, belly pain, and nausea or vomiting (feeling sick to your stomach or throwing up).  After 14 days, if you have still don't have symptoms, you likely don't have this virus.  If you develop symptoms, follow these guidelines.  If you're normally healthy: Please start another eVisit.  If you have a serious health problem (like cancer, heart failure, an organ transplant or kidney disease): Call your specialty clinic. Let them know that you might have COVID-19.    Where can I get more information?    [place] - About COVID-19: [email or web address]  CDC - What to Do If You're Sick: www.cdc.gov/coronavirus/2019-ncov/about/steps-when-sick.html  CDC - Ending Home Isolation: www.cdc.gov/coronavirus/2019-ncov/hcp/disposition-in-home-patients.html  CDC - Caring for Someone: www.cdc.gov/coronavirus/2019-ncov/if-you-are-sick/care-for-someone.html  [facility] clinical trials (COVID-19 research studies): [email or web address]-clinical-trials  Below are the COVID-19 hotlines at the [facility] ([facility]). Interpreters are available.  For health questions: Call 208-273-0089 or 1-962.838.8282 (7 a.m. to 7 p.m.)  For questions about schools and childcare: Call 270-063-0849 or 1-700.984.5790 (7 a.m. to 7 p.m.)      December 22, 2021  RE:  Patricia Levi                                                                                                                   [address] [place] [address]      To whom it may concern:    I evaluated Patricia Levi on December 22, 2021. Patricia Levi should be excused from work/school.    They should let their workplace manager and staffing office know when their  "quarantine ends.    We can not give an exact date as it depends on the information below. They can calculate this on their own or work with their manager/staffing office to calculate this. (For example if they were exposed on 10/04, they would have to quarantine for 14 full days. That would be through 10/18. They could return on 10/19.)    Quarantine Guidelines:    Patients (\"contacts\") who have been in close prolonged contact of an infected person(s) (within six feet for at least 15 minutes within a 24 hour period) and remain asymptomatic should enter quarantine based on the following options:      14-day quarantine period (this remains the CDC recommendation for the greatest protection against spread of COVID-19) OR    Minimum 7-day quarantine with negative RT-PCR test collected on day 5 or later OR    10-day quarantine with no test   Quarantine Guideline exceptions are as follows:    People who have been fully vaccinated do not need to quarantine if the exposure was at least 2 weeks after the last vaccination. This includes vaccinated health care workers.    Not fully vaccinated and unvaccinated Individuals who work in health care, congregate care, or congregate living should be off work for 14 days from their last date of exposure. Community activities for this group can be resumed based on options above. Fully vaccinated individuals in this group do not need to quarantine from work after exposure.    Not fully vaccinated and unvaccinated people whose high-risk exposure was a household member should always quarantine for 14 days from their last date of exposure. Fully vaccinated people in this category do not need to quarantine.    Not fully vaccinated or unvaccinated residents of congregate care and congregate living settings should always quarantine for 14 days from their last date of exposure. Fully vaccinated residents do not need to quarantine.    Note: If there is ongoing exposure to the covid positive " person, this quarantine period may be longer than 14 days. (For example, if they are continually exposed to their child, who tested positive and cannot isolate from them, then the quarantine of 7-14 days can't start until their child is no longer contagious. This is typically 10 days from onset to the child's symptoms. So the total duration may be 17-24 days in this case.)    Patricia Levi should continue symptom monitoring until day 14 post-exposure. If they develop signs or symptoms of COVID-19, they should isolate and get tested (even if they have been tested already).    Sincerely,  Erick Ashby PA-C

## 2022-01-27 ENCOUNTER — LAB REQUISITION (OUTPATIENT)
Dept: LAB | Facility: CLINIC | Age: 42
End: 2022-01-27

## 2022-01-27 PROCEDURE — U0005 INFEC AGEN DETEC AMPLI PROBE: HCPCS | Performed by: INTERNAL MEDICINE

## 2022-01-28 LAB — SARS-COV-2 RNA RESP QL NAA+PROBE: NEGATIVE

## 2022-02-13 ENCOUNTER — HEALTH MAINTENANCE LETTER (OUTPATIENT)
Age: 42
End: 2022-02-13

## 2022-02-17 PROBLEM — O09.529 HIGH-RISK PREGNANCY, ELDERLY MULTIGRAVIDA, UNSPECIFIED TRIMESTER: Status: RESOLVED | Noted: 2018-01-17 | Resolved: 2018-09-28

## 2022-07-03 ENCOUNTER — OFFICE VISIT (OUTPATIENT)
Dept: URGENT CARE | Facility: URGENT CARE | Age: 42
End: 2022-07-03
Payer: COMMERCIAL

## 2022-07-03 VITALS
TEMPERATURE: 97.4 F | OXYGEN SATURATION: 98 % | SYSTOLIC BLOOD PRESSURE: 108 MMHG | HEART RATE: 74 BPM | DIASTOLIC BLOOD PRESSURE: 82 MMHG

## 2022-07-03 DIAGNOSIS — H10.13 ALLERGIC CONJUNCTIVITIS, BILATERAL: Primary | ICD-10-CM

## 2022-07-03 PROCEDURE — 99213 OFFICE O/P EST LOW 20 MIN: CPT | Performed by: PHYSICIAN ASSISTANT

## 2022-07-03 RX ORDER — CETIRIZINE HYDROCHLORIDE 10 MG/1
10 TABLET ORAL DAILY
Qty: 90 TABLET | Refills: 0 | Status: SHIPPED | OUTPATIENT
Start: 2022-07-03 | End: 2023-05-24

## 2022-07-03 ASSESSMENT — ENCOUNTER SYMPTOMS
EYE REDNESS: 1
EYE PAIN: 0
CONSTITUTIONAL NEGATIVE: 1
EYE DISCHARGE: 0
PSYCHIATRIC NEGATIVE: 1
PHOTOPHOBIA: 0
NEUROLOGICAL NEGATIVE: 1
EYE ITCHING: 0

## 2022-07-03 ASSESSMENT — PAIN SCALES - GENERAL: PAINLEVEL: NO PAIN (0)

## 2022-07-03 NOTE — PROGRESS NOTES
Assessment & Plan     Allergic conjunctivitis, bilateral  - cetirizine (ZYRTEC) 10 MG tablet  Dispense: 90 tablet; Refill: 0     Instructed to take daily allergy medication Zyrtec. Use lubricating eye drops. Monitor for new or worsening symptoms.     Return in about 1 week (around 7/10/2022), or if symptoms worsen or fail to improve, for Follow up.    Subjective     Patricia is a 41 year old female who presents to clinic today for the following health issues:  Chief Complaint   Patient presents with     Urgent Care     Right eye worse, redness and irritation for over a week.      aPtricia presents with reports of both eyes being red and heavy for over a week. She has been using allergy drops which offers minimal relief. She denies vision changes. She reports watery discharge from her eyes. She denies fevers. She wears contacts, last time being yesterday. She has daily's and uses them as such.           Review of Systems   Constitutional: Negative.    Eyes: Positive for redness. Negative for photophobia, pain, discharge, itching and visual disturbance.   Skin: Negative.    Neurological: Negative.    Psychiatric/Behavioral: Negative.            Objective    /82   Pulse 74   Temp 97.4  F (36.3  C) (Tympanic)   LMP  (LMP Unknown)   SpO2 98%   Breastfeeding No   Physical Exam  Constitutional:       Appearance: Normal appearance.   Eyes:      General: Lids are normal.         Right eye: No foreign body, discharge or hordeolum.         Left eye: No foreign body, discharge or hordeolum.      Conjunctiva/sclera:      Right eye: Right conjunctiva is injected. No exudate or hemorrhage.     Left eye: Left conjunctiva is injected. No exudate or hemorrhage.     Pupils: Pupils are equal, round, and reactive to light.   Skin:     General: Skin is warm and dry.   Neurological:      Mental Status: She is alert.              Jericho Mendoza PA-C

## 2022-07-10 ASSESSMENT — ENCOUNTER SYMPTOMS
ARTHRALGIAS: 0
EYE PAIN: 0
MUSCLE CRAMPS: 0
NECK PAIN: 0
JOINT SWELLING: 0
DECREASED LIBIDO: 0
EYE IRRITATION: 0
MYALGIAS: 0
BACK PAIN: 0
HOT FLASHES: 0
DOUBLE VISION: 0
EYE REDNESS: 1
MUSCLE WEAKNESS: 0
EYE WATERING: 1
STIFFNESS: 1

## 2022-07-12 ENCOUNTER — OFFICE VISIT (OUTPATIENT)
Dept: INTERNAL MEDICINE | Facility: CLINIC | Age: 42
End: 2022-07-12
Payer: COMMERCIAL

## 2022-07-12 VITALS
SYSTOLIC BLOOD PRESSURE: 107 MMHG | HEIGHT: 64 IN | BODY MASS INDEX: 29.89 KG/M2 | HEART RATE: 89 BPM | WEIGHT: 175.1 LBS | OXYGEN SATURATION: 99 % | DIASTOLIC BLOOD PRESSURE: 64 MMHG | RESPIRATION RATE: 16 BRPM

## 2022-07-12 DIAGNOSIS — M21.611 BUNION, RIGHT: ICD-10-CM

## 2022-07-12 DIAGNOSIS — H57.89 EYE REDNESS: ICD-10-CM

## 2022-07-12 DIAGNOSIS — Z00.00 HEALTHCARE MAINTENANCE: Primary | ICD-10-CM

## 2022-07-12 PROCEDURE — 99385 PREV VISIT NEW AGE 18-39: CPT | Mod: GC

## 2022-07-12 NOTE — PROGRESS NOTES
PRIMARY CARE CENTER         HPI:      HPI:  Patricia Levi is a 41 year old female with no significant PMH who presents for general wellness exam, R bunion and b/l eye redness x 1 month.     She has had eye redness, swelling, watery discharge, no pain or itching Since Mid June. Tried Refresh eye drops, Cetirizine daily. Naphcon A drops to relieve redness. Warm compresses feel good, but doesn't help it resolve. Usually wears contact, now wearing glasses. Day it happened was at her son's baseball game. Dirt from field was blowing around. She does have seasonal allergies. No gritty sensation in eyes or vision changes. No dry mouth.     She went to urgent care last week and then got a sore throat.Was the worse sore throat she ever had. Did a home covid test that was negative. No fever or cough. Throat still sore. No nasal congestion or sinus pressure.     Bunion R medial foot x 5 years. Worse over past six months.     Denies fevers, chills, CP, SOB, abd, N/V/D.     Surgical History:  none    Family History:  -one of her children had leukemia    Social History:  -EtOH: social use couple drinks per week (8-10 per month)  -smoking/illicit/recreational  -Occupational: clinic director on [employer]      Medications:  Current Outpatient Medications   Medication     cetirizine (ZYRTEC) 10 MG tablet     Current Facility-Administered Medications   Medication     levonorgestrel (MIRENA) 20 MCG/24HR IUD 20 mcg          Allergies:     Allergies   Allergen Reactions     Pcn [Penicillins] Rash     Noted when child         Medical History:  Past Medical History:   Diagnosis Date     Adjustment disorder     situational during sons illness leukemia      Asthma      Migraine headache      Seasonal allergies          Problem, Medication and Allergy Lists were reviewed and are current.reviewed and are current.  Patient is a new patient to this clinic and so  I reviewed/updated the Past Medical History, the Family History and the Social  "History.          Review of Systems:     10 point ROS negative unless otherwise specified in HPI  ROS  I have personally reviewed and updated the complete ROS on the day of the visit.           Physical Exam:   /64 (BP Location: Right arm, Patient Position: Sitting, Cuff Size: Adult Regular)   Pulse 89   Resp 16   Ht 1.626 m (5' 4\")   Wt 79.4 kg (175 lb 1.6 oz)   LMP  (LMP Unknown)   SpO2 99%   Breastfeeding No   BMI 30.06 kg/m    Body mass index is 30.06 kg/m .  Vitals were reviewed    Physical Exam:   General: Sitting upright, talking in complete sentences, non-distressed  HEENT: Normocephalic, atraumatic, PERRL, EOMI, b/l conjunctival erythema, glassy appearance of eyes, anicteric, nares patent, oropharynx clear and moist, no oropharyngeal erythema  CVS: S1/S2 WNL, RRR, no murmur, no LE edema  Pulm: Non-labored breathing, vesicular breath sounds, no crackles or wheeze  Abdo: Non-distended, non-tender to palpation, no rebound or guarding, BS present   MSK: No obvious bony deformities, no clubbing  Skin: Warm and dry, no obvious rashes  Neuro: Alert and oriented x3, non-focal   Psych: Normal mood and affect       Results:   None    Assessment and Plan     Patricia with no PMH was seen today for establish care, physical, annual visit, foot problems, referral and eye problem.    Diagnoses and all orders for this visit:    Healthcare maintenance    [facility]  -Colonoscopy: n/a  -Mammogram: Will order this in 2023  -Pap/HPV: Last in 2017, negative. She will follow up with her OB/GYN to get a repeat pap this year.   -STI/HIV: all negative in   -HCV:  -DM: n/a  -HTN: n/a  -Lipids: WNL in 2017, will screen again at age 47  -Smoking/AAA: n/a  -EtOH: n/a  -DEXA: n/a  -Immunizations: Covid vaccine and booster    Bunion, right  -     Orthopedic  Referral; Future    Eye redness  Present for 1 month. Not painful, but her eyelids feel heavy. She has a hx of allergies but has been taking Cetirizine which provided no " relief. Also using Refresh eye drops every few hours. Was seen by Ophtho 7 years ago and diagnosed with issue with tear production and meibomian glands. Considered viral conjunctivitis, but pt denies pain and itching. She only has watery discharge and I would expect a viral process to resolve by this time. Also consider allergies, but she has been unresponsive to cetirizine. Possible dry eyes given the severe redness and irritation.   -Recommended GenTeal Tears Severe Day/Nigh or Systane lubricant eye drops at night  -Continue using Refresh drops as needed  -If If this does not improve within the coming weeks, please contact our office and we can provide you with an ophthalmology referral    Options for treatment and follow-up care were reviewed with the patient. Patricia Levi engaged in the decision making process and verbalized understanding of the options discussed and agreed with the final plan.        Pt was seen and plan of care discussed with Dr Ayad Looney.    Miya Donis MD  Internal Medicine-PGY2  [facility]  Pager: [phone]  Jul 12, 2022

## 2022-07-12 NOTE — NURSING NOTE
"Patricia Levi is a 41 year old female patient that presents today in clinic for the following:    Chief Complaint   Patient presents with     Establish Care     Physical     Annual Visit     Foot Problems     Referral     Eye Problem     Eye redness     The patient's allergies and medications were reviewed as noted. A set of vitals were recorded as noted without incident: /64 (BP Location: Right arm, Patient Position: Sitting, Cuff Size: Adult Regular)   Pulse 89   Resp 16   Ht 1.626 m (5' 4\")   Wt 79.4 kg (175 lb 1.6 oz)   LMP  (LMP Unknown)   SpO2 99%   Breastfeeding No   BMI 30.06 kg/m  . The patient does not have any other questions for the provider.    MEGHNA Bo at 9:10 AM on 7/12/2022  "

## 2022-07-12 NOTE — PATIENT INSTRUCTIONS
Roly Gomez,    It was a pleasure seeing you in the clinic today. We discussed your eye redness and your bunion    GenTeal Tears Severe Day/Night: Polyethylene glycol 400 0.4% and propylene glycol 0.3% (8 mL)    OR    Systane: Polyethylene glycol 400 0.4% and propylene glycol 0.3% (10 mL, 15 mL)    Please use these eye drops at night time.     If If this does not improve within the next few weeks, please contact our office and we can provide you with an ophthalmology referral.     I have also ordered a podiatry referral for you.     I will see you back in 1 year for a wellness exam. Don't forget to get your pap smear! Call the office if you cannot be seen by your OB/GYN.     Please do not hesitate to reach out to me or schedule an appointment if you have any questions or concerns.     All the best,    Dr. Donis

## 2022-07-18 ENCOUNTER — OFFICE VISIT (OUTPATIENT)
Dept: OPTOMETRY | Facility: CLINIC | Age: 42
End: 2022-07-18
Payer: COMMERCIAL

## 2022-07-18 ENCOUNTER — TELEPHONE (OUTPATIENT)
Dept: OPTOMETRY | Facility: CLINIC | Age: 42
End: 2022-07-18

## 2022-07-18 DIAGNOSIS — H10.89 PAPILLARY CONJUNCTIVITIS: Primary | ICD-10-CM

## 2022-07-18 DIAGNOSIS — H10.13 ALLERGIC CONJUNCTIVITIS, BILATERAL: ICD-10-CM

## 2022-07-18 RX ORDER — FLUOROMETHOLONE 0.1 %
SUSPENSION, DROPS(FINAL DOSAGE FORM)(ML) OPHTHALMIC (EYE)
Qty: 5 ML | Refills: 1 | Status: SHIPPED | OUTPATIENT
Start: 2022-07-18 | End: 2022-08-08

## 2022-07-18 ASSESSMENT — VISUAL ACUITY
METHOD: SNELLEN - LINEAR
OD_CC: 20/25+2
OS_CC: 20/25+3
CORRECTION_TYPE: GLASSES

## 2022-07-18 ASSESSMENT — SLIT LAMP EXAM - LIDS
COMMENTS: TR BLEPH
COMMENTS: TR BLEPH

## 2022-07-18 ASSESSMENT — TONOMETRY
OS_IOP_MMHG: 14
IOP_METHOD: ICARE
OD_IOP_MMHG: 15

## 2022-07-18 ASSESSMENT — EXTERNAL EXAM - RIGHT EYE: OD_EXAM: NORMAL

## 2022-07-18 ASSESSMENT — EXTERNAL EXAM - LEFT EYE: OS_EXAM: NORMAL

## 2022-07-18 NOTE — TELEPHONE ENCOUNTER
Scheduled for today 1:30pm with Dr. Sr.  Date/time/location confirmed.    MARSHALL Langley 8:58 AM 07/18/2022

## 2022-07-18 NOTE — PATIENT INSTRUCTIONS
START FML (fluorometholone) eyedrops 3x/day for 1 week, then 2x/day for 1 week, then 1x/day for 1 week then stop    START Pataday (Olopatadine) over-the-counter allergy drops. Use once daily in both eyes. Use this long term for during allergy season    Continue artificial tears as needed    Cool compresses to the eyelids can help swelling

## 2022-07-18 NOTE — PROGRESS NOTES
A/P  1.) Allergic/Papillary conjunctivitis OU  -Red eyes OU x 1 month, not improving on AT. Watery/photophobic/irritated  -Intolerant to CL use currently  -Corneas clear without infiltrate/stain  -Significant papillae OU, mild conj chemosis. Similar episode 2016  -Start FML tid OU with 3/2/1 taper. Start Pataday daily OU  -Continue AT prn for comfort, cold compresses for comfort    RTC if symptoms worsen or do not improve

## 2022-07-18 NOTE — TELEPHONE ENCOUNTER
[facility] Call Center    Phone Message    May a detailed message be left on voicemail: yes     Reason for Call: Symptoms or Concerns     If patient has red-flag symptoms, warm transfer to triage line    Current symptom or concern: Pt states she had eye redness that started about a month ago, that has progressed to pain and light sensitivity now as well.    Symptoms have been present for:  1 month(s)    Has patient previously been seen for this? No    By : Dr. Sr    Date: 8/26/21    Are there any new or worsening symptoms? Yes: Eye redness, pain and light sensitivity.      Action Taken: Message routed to:  [facility] EYE    Travel Screening: Not Applicable

## 2022-09-11 ENCOUNTER — LAB REQUISITION (OUTPATIENT)
Dept: LAB | Facility: CLINIC | Age: 42
End: 2022-09-11

## 2022-09-11 PROCEDURE — U0003 INFECTIOUS AGENT DETECTION BY NUCLEIC ACID (DNA OR RNA); SEVERE ACUTE RESPIRATORY SYNDROME CORONAVIRUS 2 (SARS-COV-2) (CORONAVIRUS DISEASE [COVID-19]), AMPLIFIED PROBE TECHNIQUE, MAKING USE OF HIGH THROUGHPUT TECHNOLOGIES AS DESCRIBED BY CMS-2020-01-R: HCPCS | Performed by: INTERNAL MEDICINE

## 2022-09-12 LAB — SARS-COV-2 RNA RESP QL NAA+PROBE: NEGATIVE

## 2022-09-26 ENCOUNTER — OFFICE VISIT (OUTPATIENT)
Dept: OPTOMETRY | Facility: CLINIC | Age: 42
End: 2022-09-26
Payer: COMMERCIAL

## 2022-09-26 DIAGNOSIS — H52.203 MYOPIA OF BOTH EYES WITH ASTIGMATISM: Primary | ICD-10-CM

## 2022-09-26 DIAGNOSIS — H52.13 MYOPIA OF BOTH EYES WITH ASTIGMATISM: Primary | ICD-10-CM

## 2022-09-26 DIAGNOSIS — H10.89 PAPILLARY CONJUNCTIVITIS: ICD-10-CM

## 2022-09-26 ASSESSMENT — SLIT LAMP EXAM - LIDS
COMMENTS: NORMAL
COMMENTS: NORMAL

## 2022-09-26 ASSESSMENT — VISUAL ACUITY
VA_OR_OS_CURRENT_RX: 20/20-
OD_CC: J1
METHOD: SNELLEN - LINEAR
CORRECTION_TYPE: GLASSES
OS_CC: 20/20
OS_CC: J1+
OS_CC+: -1
VA_OR_OD_CURRENT_RX: 20/20
OD_CC+: -2
OD_CC: 20/25

## 2022-09-26 ASSESSMENT — REFRACTION_MANIFEST
OS_AXIS: 062
OD_AXIS: 135
OS_SPHERE: -3.50
OD_CYLINDER: +0.50
OS_CYLINDER: +0.50
OD_SPHERE: -3.25

## 2022-09-26 ASSESSMENT — TONOMETRY
IOP_METHOD: ICARE
OD_IOP_MMHG: 16
OS_IOP_MMHG: 15

## 2022-09-26 ASSESSMENT — REFRACTION_WEARINGRX
OS_CYLINDER: +0.50
OS_SPHERE: -3.50
OD_AXIS: 096
OD_SPHERE: -3.00
OS_AXIS: 075
OD_CYLINDER: +0.50

## 2022-09-26 ASSESSMENT — CONF VISUAL FIELD
METHOD: COUNTING FINGERS
OD_NORMAL: 1
OS_NORMAL: 1

## 2022-09-26 ASSESSMENT — EXTERNAL EXAM - LEFT EYE: OS_EXAM: NORMAL

## 2022-09-26 ASSESSMENT — CUP TO DISC RATIO
OS_RATIO: 0.15
OD_RATIO: 0.15

## 2022-09-26 ASSESSMENT — REFRACTION_CURRENTRX
OD_DIAMETER: 14.2
OS_DIAMETER: 14.2
OD_CYLINDER: SPHERE
OS_CYLINDER: SPHERE
OS_BASECURVE: 8.50
OS_BRAND: J&J 1-DAY ACUVUE MOIST BC 8.5, D 14.2
OD_BRAND: J&J 1-DAY ACUVUE MOIST BC 8.5, D 14.2
OD_BASECURVE: 8.50
OD_SPHERE: -3.00
OS_SPHERE: -3.25

## 2022-09-26 ASSESSMENT — EXTERNAL EXAM - RIGHT EYE: OD_EXAM: NORMAL

## 2022-09-28 NOTE — PROGRESS NOTES
A/P  1.) Myopia/Astigmatism each eye  -Very mild change in spec Rx, updated today  -Limited CL wear time with chronic allergic/papillary conjunctivitis. No GPC. Already in dailies  -Symptoms improved with FML course, but returned once she stopped and have been better but not gone on AT and [place] daily  -Previously in SiHy material - can trial hydrogel dailies to see if this improves  -Dilated ocular health unremarkable OU  -May consider LASIK eval if unable to tolerate CL's. Script stable at this time    Monitor 1 year comprehensive eye exam, sooner prn with new eye concerns    I have confirmed the patient's CC, HPI and reviewed Past Medical History, Past Surgical History, Social History, Family History, Problem List, Medication List and agree with Tech note.     Anneliese Sr, YVES ZIMMERMANO LUIGIS

## 2022-10-15 ENCOUNTER — HEALTH MAINTENANCE LETTER (OUTPATIENT)
Age: 42
End: 2022-10-15

## 2022-10-26 ENCOUNTER — MYC MEDICAL ADVICE (OUTPATIENT)
Dept: FAMILY MEDICINE | Facility: CLINIC | Age: 42
End: 2022-10-26

## 2023-03-24 NOTE — LETTER
"2018       RE: Patricia Levi  [address] [facility] [place] [address]     Dear Colleague,    Thank you for referring your patient, Patricia Levi, to the [facility] at [facility]. Please see a copy of my visit note below.      Subjective   37 year old female who presents to clinic for initiation of OB care.   at 9w1d with estimated date of delivery of Aug 21, 2018 based on LMP, ultrasound confirms.  Pregnancy is planned.    Symptoms since LMP include mild nausea and fatigue.  Patient has tried these relief measures: diet modification, small frequent meals, increased rest and increased fluids.      PERSONAL/SOCIAL HISTORY  Lives with  and son.  Employment: full time. Pharmacy tech at [employer]. Work place recently moved from [address]   to , \"[place]  in 2017.   He is the FOB to this child; not birth father of 1st child.    Additional items: Denies past or present domestic violence, sexual and psychological abuse.    OTHER:    - Son, \"Diogo,\" born in . Diagnosed with acute lymphoblastic leukemia at 2.5 years old. Now 6 years old and had last cancer treatment 1 year   ago.   - Hx of adjustment disorder- anxiety and depression- during this time. Reports she saw a counselor to cope with stress of her sick child. Denies  concerns now; is not on medication, not continuing with counseling.    Objective  -VS: reviewed and within normal limits   -General appearance: no acute distress, patient is comfortable   NEUROLOGICAL/PSYCHIATRIC   - Oriented x3,   -Mood and affect: : normal   Genetic/Infection questionnaire completed, risks include: none    Pt reports last pap was last year at [facility] in [place].    Assessment/Plan   at 9w1d  by Patient's last menstrual period was 2017. and US confirms  Encounter Diagnosis   Name Primary?     High-risk pregnancy, elderly multigravida, first " Group Therapy Note    Date: 3/24/2023    Group Start Time: 8930  Group End Time: 0940  Group Topic: Community Meeting    LONG Johns RN      Patient did not participate in [employer] group, despite staff encouragement and explanation of benefits. Patient remain seclusive to self. Q15 minute safety checks maintained for patient safety and will continue to encourage patient to attend unit programming.        Signature:  Dorcas Johns RN trimester Yes     Orders Placed This Encounter   Procedures     25- OH-Vitamin D     CBC with Platelets Differential [CCH776]     Anti Treponema [[id number]]     Rubella Antibody IgG Quantitative [[id number]]     Hepatitis B Surface Antigen [FWJ403]     Hepatitis B Surface Antibody     HIV Antigen Antibody Combo [[id number]]     [facility] Referral [9046.022]     ABO/Rh Type and Screen [HNX797]     Orders Placed This Encounter   Medications     Prenatal Vit-Fe Fumarate-FA (PRENATAL MULTIVITAMIN PLUS IRON) 27-0.8 MG TABS per tablet     Sig: Take 1 tablet by mouth daily     - Oriented to Practice, types of care, and how to reach a provider. Undecided CNM or MD.   - Patient received 1st trimester new OB education packet complete with aide of [facility] Expectant Family booklet including information on genetic screening test options.  - Patient desires level II ultrasound which was ordered.  - Patient was encouraged to start prenatal vitamins as tolerated.    - Patient instructed to schedule new OB exam with provider at 10 weeks.    - Pregnancy concerns to be addressed by provider at new OB exam include: Needs GC/CT at [facility], record release for pap done last year, cnm or md care.    OBI education reviewed.  OBI labs ordered.  Needs record release completed at [facility] for pap. Up to date- done last year at [facility].  Needs GC/CT at [facility].  Desires level 2 ultrasound, MFM referral placed.   Undecided on 1st trimester screening, discuss again at [facility].  Undecided KARO or MD care.    Pt to RTO for NOB visit in 2 weeks and prn if questions or concerns    ESTEE Heller, KARO

## 2023-03-28 ENCOUNTER — TELEPHONE (OUTPATIENT)
Dept: OPTOMETRY | Facility: CLINIC | Age: 43
End: 2023-03-28

## 2023-03-28 NOTE — TELEPHONE ENCOUNTER
Contact lens Rx faxed per request.    Spoke to pt at 1348 and updated pt Rx faxed    Tao Sandoval RN 1:49 PM 03/28/23            [facility] Call Center    Phone Message    May a detailed message be left on voicemail: yes     Reason for Call: Other: Patient called stating she is on vacation and forgot her contacts. She is asking for the script for those to be faxed to [facility] in [facility]. Fax is : [phone]     Action Taken: Other: eye    Travel Screening: Not Applicable

## 2023-05-23 DIAGNOSIS — H10.13 ALLERGIC CONJUNCTIVITIS, BILATERAL: ICD-10-CM

## 2023-05-24 RX ORDER — CETIRIZINE HYDROCHLORIDE 10 MG/1
10 TABLET ORAL DAILY
Qty: 60 TABLET | Refills: 5 | Status: SHIPPED | OUTPATIENT
Start: 2023-05-24 | End: 2024-06-05

## 2023-05-24 NOTE — TELEPHONE ENCOUNTER
cetirizine (ZYRTEC) 10 MG tablet      Last Written Prescription Date:  07-  Last Fill Quantity: 90,   # refills: 0  Last Office Visit : 9-  Future Office visit:  Not on file

## 2023-08-20 ENCOUNTER — HEALTH MAINTENANCE LETTER (OUTPATIENT)
Age: 43
End: 2023-08-20

## 2023-10-23 ENCOUNTER — TELEPHONE (OUTPATIENT)
Dept: OBGYN | Facility: CLINIC | Age: 43
End: 2023-10-23
Payer: COMMERCIAL

## 2023-10-23 NOTE — TELEPHONE ENCOUNTER
[facility] Call Center    Phone Message    May a detailed message be left on voicemail: yes     Reason for Call: Other: Patient is calling to discuss her IUD she has in and when needing to have it removed. Patient had it placed in 2019. Please call the patient back. Thank you      Action Taken: Message routed to:  Other: OBGYN    Travel Screening: Not Applicable

## 2023-12-05 ENCOUNTER — E-VISIT (OUTPATIENT)
Dept: URGENT CARE | Facility: CLINIC | Age: 43
End: 2023-12-05
Payer: COMMERCIAL

## 2023-12-05 ENCOUNTER — LAB (OUTPATIENT)
Dept: LAB | Facility: CLINIC | Age: 43
End: 2023-12-05
Payer: COMMERCIAL

## 2023-12-05 DIAGNOSIS — R05.1 ACUTE COUGH: Primary | ICD-10-CM

## 2023-12-05 DIAGNOSIS — R05.1 ACUTE COUGH: ICD-10-CM

## 2023-12-05 LAB
C PNEUM DNA SPEC QL NAA+PROBE: NOT DETECTED
FLUAV H1 2009 PAND RNA SPEC QL NAA+PROBE: NOT DETECTED
FLUAV H1 RNA SPEC QL NAA+PROBE: NOT DETECTED
FLUAV H3 RNA SPEC QL NAA+PROBE: NOT DETECTED
FLUAV RNA SPEC QL NAA+PROBE: NOT DETECTED
FLUBV RNA SPEC QL NAA+PROBE: NOT DETECTED
HADV DNA SPEC QL NAA+PROBE: NOT DETECTED
HCOV PNL SPEC NAA+PROBE: NOT DETECTED
HMPV RNA SPEC QL NAA+PROBE: NOT DETECTED
HPIV1 RNA SPEC QL NAA+PROBE: NOT DETECTED
HPIV2 RNA SPEC QL NAA+PROBE: NOT DETECTED
HPIV3 RNA SPEC QL NAA+PROBE: NOT DETECTED
HPIV4 RNA SPEC QL NAA+PROBE: NOT DETECTED
M PNEUMO DNA SPEC QL NAA+PROBE: NOT DETECTED
RSV RNA SPEC QL NAA+PROBE: NOT DETECTED
RSV RNA SPEC QL NAA+PROBE: NOT DETECTED
RV+EV RNA SPEC QL NAA+PROBE: NOT DETECTED

## 2023-12-05 PROCEDURE — 87581 M.PNEUMON DNA AMP PROBE: CPT

## 2023-12-05 PROCEDURE — 99421 OL DIG E/M SVC 5-10 MIN: CPT | Performed by: EMERGENCY MEDICINE

## 2023-12-05 PROCEDURE — 87633 RESP VIRUS 12-25 TARGETS: CPT

## 2023-12-05 PROCEDURE — 87486 CHLMYD PNEUM DNA AMP PROBE: CPT

## 2023-12-05 NOTE — PATIENT INSTRUCTIONS
Patricia,    Thank you for choosing us for your care. I have placed an order for a lab test(s). View your full visit summary for details by clicking on the link below. You can schedule a lab only appointment right here in [facility], or by calling [phone].    You will receive your lab results and next steps via [facility] when the lab results return.    Sincerely,  Geo Ramos MD

## 2023-12-07 ENCOUNTER — MYC MEDICAL ADVICE (OUTPATIENT)
Dept: INTERNAL MEDICINE | Facility: CLINIC | Age: 43
End: 2023-12-07
Payer: COMMERCIAL

## 2023-12-08 NOTE — TELEPHONE ENCOUNTER
Called patient and lvm- I will hold the appt at 3pm with Dr. De La Rosa or should see .    Rolly Zepeda RN on 12/8/2023 at 2:19 PM

## 2023-12-18 ENCOUNTER — OFFICE VISIT (OUTPATIENT)
Dept: FAMILY MEDICINE | Facility: CLINIC | Age: 43
End: 2023-12-18
Payer: COMMERCIAL

## 2023-12-18 VITALS
HEART RATE: 85 BPM | HEIGHT: 66 IN | TEMPERATURE: 98.1 F | WEIGHT: 186.4 LBS | RESPIRATION RATE: 19 BRPM | DIASTOLIC BLOOD PRESSURE: 87 MMHG | SYSTOLIC BLOOD PRESSURE: 134 MMHG | OXYGEN SATURATION: 98 % | BODY MASS INDEX: 29.96 KG/M2

## 2023-12-18 DIAGNOSIS — J20.9 SUBACUTE BRONCHITIS: Primary | ICD-10-CM

## 2023-12-18 RX ORDER — METHYLPREDNISOLONE 4 MG
TABLET, DOSE PACK ORAL
Qty: 21 TABLET | Refills: 0 | Status: SHIPPED | OUTPATIENT
Start: 2023-12-18 | End: 2024-01-05

## 2023-12-18 RX ORDER — CODEINE PHOSPHATE AND GUAIFENESIN 10; 100 MG/5ML; MG/5ML
1-2 SOLUTION ORAL EVERY 4 HOURS PRN
Qty: 118 ML | Refills: 0 | Status: SHIPPED | OUTPATIENT
Start: 2023-12-18 | End: 2024-05-16

## 2023-12-18 RX ORDER — ALBUTEROL SULFATE 90 UG/1
2 AEROSOL, METERED RESPIRATORY (INHALATION) EVERY 6 HOURS PRN
Qty: 18 G | Refills: 0 | Status: SHIPPED | OUTPATIENT
Start: 2023-12-18 | End: 2024-06-05

## 2023-12-18 ASSESSMENT — ENCOUNTER SYMPTOMS
COUGH: 1
FEVER: 0
SHORTNESS OF BREATH: 1
CHILLS: 0
SORE THROAT: 0
ABDOMINAL PAIN: 0
WHEEZING: 1

## 2023-12-18 ASSESSMENT — PAIN SCALES - GENERAL: PAINLEVEL: NO PAIN (0)

## 2023-12-18 NOTE — NURSING NOTE
"ROOM:3  ANITRA BERMUDEZ    Preferred Name: Patricia     How did you hear about us?  Other - Referred by another clinic    43 year old  Chief Complaint   Patient presents with     Cough     Cough, feels like it is hard to get a deep breath  Fatigue  Covid PCR was negative  Symptoms started , symptoms have changed        Blood pressure 134/87, pulse 85, temperature 98.1  F (36.7  C), temperature source Oral, resp. rate 19, height 1.684 m (5' 6.3\"), weight 84.6 kg (186 lb 6.4 oz), SpO2 98%, not currently breastfeeding. Body mass index is 29.81 kg/m .  BP completed using cuff size:        Patient Active Problem List   Diagnosis     IUD (intrauterine device) in place- Mirena 2019       Wt Readings from Last 2 Encounters:   23 84.6 kg (186 lb 6.4 oz)   22 79.4 kg (175 lb 1.6 oz)     BP Readings from Last 3 Encounters:   23 134/87   22 107/64   22 108/82       Allergies   Allergen Reactions     Pcn [Penicillins] Rash     Noted when child       Current Outpatient Medications   Medication     cetirizine (ZYRTEC) 10 MG tablet     Current Facility-Administered Medications   Medication     levonorgestrel (MIRENA) 20 MCG/24HR IUD 20 mcg       Social History     Tobacco Use     Smoking status: Former     Years: 5     Types: Cigarettes     Quit date: 2006     Years since quittin.9     Smokeless tobacco: Never   Substance Use Topics     Alcohol use: No     Comment: occ     Drug use: No       Honoring Choices - Health Care Directive Guide offered to patient at time of visit.    Health Maintenance Due   Topic Date Due     ADVANCE CARE PLANNING  Never done     HEPATITIS C SCREENING  Never done     HPV TEST  2020     PAP  2020     YEARLY PREVENTIVE VISIT  2023     COVID-19 Vaccine ( season) 2023       Immunization History   Administered Date(s) Administered     COVID-19 MONOVALENT 12+ (Pfizer) 2021, 2021     COVID-19 Monovalent 18+ (Moderna) " 12/14/2021     Influenza Vaccine >6 months,quad, PF 11/05/2018     TDAP (Adacel,Boostrix) 12/14/2009     TDAP Vaccine (Boostrix) 06/07/2018       Lab Results   Component Value Date    PAP NIL 04/06/2005       Recent Labs   Lab Test 03/24/17  1032   LDL 80   HDL 48*   TRIG 69   ALT 37   CR 0.64   GFRESTIMATED >90  Non  GFR Calc     GFRESTBLACK >90   GFR Calc     ALBUMIN 4.1   POTASSIUM 3.8   TSH 0.74           12/18/2023     2:48 PM 7/12/2022     9:08 AM   PHQ-2 ( 1999 Pfizer)   Q1: Little interest or pleasure in doing things 0 0   Q2: Feeling down, depressed or hopeless 0 0   PHQ-2 Score 0 0           1/31/2018    10:25 AM 9/28/2018     4:30 PM   PHQ-9 SCORE   PHQ-9 Total Score 0 0           1/31/2018    10:25 AM 9/28/2018     4:30 PM   OLIVIA-7 SCORE   Total Score 0 0            No data to display                Aydee Taylor, EMT    December 18, 2023 2:51 PM

## 2023-12-18 NOTE — PROGRESS NOTES
Today's Date: Dec 18, 2023     Patient Patricia Levi 1980 presents to the clinic today to address   Chief Complaint   Patient presents with    Cough     Cough, feels like it is hard to get a deep breath  Fatigue  Covid PCR was negative  Symptoms started nov 30, symptoms have changed              SUBJECTIVE     History of Present Illness:    43-year-old female with past medical history seasonal allergies, migraines, asthma (exercise-induced in her 20s) presents to discuss cough.  Patient reports she initially had URI symptoms that started on 11/30/2023 which included fevers, chills, aches, and cough.  The fevers, chills, aches resolved after 10 days.  She reports that her cough has been persistent with intermittent shortness of breath, wheezing, and postnasal drip. Patient feels that her breathing is more shallow than normal.  She used her son's nebulizer yesterday which helped her symptoms.  She currently denies congestion, sore throat, chills, fevers, chest pain, or abdominal pain. No other acute concerns/symptoms at time of exam.        Review of Systems   Constitutional: Negative for chills and fever.   HENT: Positive for postnasal drip. Negative for congestion and sore throat.    Respiratory: Positive for cough, shortness of breath and wheezing.    Cardiovascular: Negative for chest pain.   Gastrointestinal: Negative for abdominal pain.   Constitutional, HEENT, cardiovascular, pulmonary, gi and gu systems are negative, except as otherwise noted.      Allergies   Allergen Reactions    Pcn [Penicillins] Rash     Noted when child        Current Outpatient Medications   Medication Instructions    cetirizine (ZYRTEC) 10 mg, Oral, DAILY       Past Medical History:   Diagnosis Date    Adjustment disorder     situational during sons illness leukemia     Asthma     Migraine headache     Seasonal allergies         Family History   Problem Relation Age of Onset    Heart Disease Maternal Grandfather         MI     "Alcohol/Drug Paternal Grandfather         alcoholism    Hypertension Mother     Depression Sister     Dementia Paternal Grandmother     Anxiety Disorder Sister     Substance Abuse Sister     Leukemia Son         acute lymphoblastic leukemia     Glaucoma No family hx of     Macular Degeneration No family hx of         Social History     Tobacco Use    Smoking status: Former     Years: 5     Types: Cigarettes     Quit date: 2006     Years since quittin.9    Smokeless tobacco: Never   Substance Use Topics    Alcohol use: No     Comment: occ    Drug use: No        History   Sexual Activity    Sexual activity: Yes    Partners: Male    Birth control/ protection: None            2018    10:25 AM 2018     4:30 PM   PHQ   PHQ-9 Total Score 0 0   Q9: Thoughts of better off dead/self-harm past 2 weeks Not at all Not at all        Immunization History   Administered Date(s) Administered    COVID-19 MONOVALENT 12+ (Pfizer) 2021, 2021    COVID-19 Monovalent 18+ (Moderna) 2021    Influenza Vaccine >6 months,quad, PF 2018    TDAP (Adacel,Boostrix) 2009    TDAP Vaccine (Boostrix) 2018                 OBJECTIVE     /87 (BP Location: Left arm, Patient Position: Sitting, Cuff Size: Adult Regular)   Pulse 85   Temp 98.1  F (36.7  C) (Oral)   Resp 19   Ht 1.684 m (5' 6.3\")   Wt 84.6 kg (186 lb 6.4 oz)   SpO2 98%   BMI 29.81 kg/m       Labs:  Lab Results   Component Value Date    WBC 14.8 (H) 2018    HGB 11.8 2018    HCT 39.3 2018     2018    CHOL 142 2017    TRIG 69 2017    HDL 48 (L) 2017    ALT 37 2017    AST 27 2017     2017    BUN 8 2017    CO2 29 2017    TSH 0.74 2017        Physical Exam  Constitutional:       General: She is not in acute distress.     Appearance: She is not ill-appearing.   HENT:      Right Ear: Tympanic membrane normal.      Left Ear: Tympanic membrane " normal.      Nose: No rhinorrhea.      Right Turbinates: Not enlarged.      Left Turbinates: Enlarged.      Right Sinus: No maxillary sinus tenderness or frontal sinus tenderness.      Left Sinus: No maxillary sinus tenderness or frontal sinus tenderness.      Mouth/Throat:      Pharynx: No oropharyngeal exudate.   Eyes:      Extraocular Movements: Extraocular movements intact.   Cardiovascular:      Rate and Rhythm: Normal rate and regular rhythm.      Heart sounds: Normal heart sounds. No murmur heard.  Pulmonary:      Effort: Pulmonary effort is normal. No respiratory distress.      Breath sounds: Examination of the right-lower field reveals wheezing. Wheezing (Faint) present. No rhonchi or rales.   Musculoskeletal:      Cervical back: Neck supple.   Lymphadenopathy:      Cervical: No cervical adenopathy.   Skin:     General: Skin is warm and dry.   Neurological:      General: No focal deficit present.      Mental Status: She is alert.   Psychiatric:         Thought Content: Thought content normal.         Judgment: Judgment normal.               ASSESSMENT/PLAN     1. Subacute bronchitis  This is a pleasant 43-year-old female patient with past medical history significant for seasonal allergies, migraines, exercise-induced asthma who presents to discuss persistent cough after her URI at the end of November.  Patient is currently afebrile and  in no acute distress.  Her left turbinate was enlarged and she had faint wheezing to the right lower lobe, otherwise physical exam was unremarkable.  Considering her past history of exercise-induced asthma with persistent cough following resolution of fevers/chills/aches, suspect this is a case of budding subacute bronchitis.  We discussed how most cases of bronchitis are viral.  We also discussed obtaining a chest x-ray or starting antibiotic versus a trial of supportive care.  After our discussion, patient open to trial of supportive care including albuterol inhaler,  Medrol Dosepak, and Robitussin AC at night (patient was advised to not drive/operate heavy machinery after taking his medication).  If symptoms worsen or fail to improve, we will obtain a chest x-ray and consider antibiotics. Also advised restarting zrytec considering PND.  - albuterol (PROAIR HFA/PROVENTIL HFA/VENTOLIN HFA) 108 (90 Base) MCG/ACT inhaler; Inhale 2 puffs into the lungs every 6 hours as needed for shortness of breath, wheezing or cough  Dispense: 18 g; Refill: 0  - methylPREDNISolone (MEDROL DOSEPAK) 4 MG tablet therapy pack; Follow package instructions  Dispense: 21 tablet; Refill: 0  - guaiFENesin-codeine (ROBITUSSIN AC) 100-10 MG/5ML solution; Take 5-10 mLs by mouth every 4 hours as needed for cough  Dispense: 118 mL; Refill: 0      Education provided on at-home supportive measures including humidified air.    Follow-Up:  - Follow up in as needed, or if symptoms worsen or fail to improve.     Options for treatment and follow-up care were reviewed with the patient. Patient engaged in the decision making process and verbalized understanding of the options discussed and agreed with the final plan.  AVS printed and given to patient.    ESTEE Ortiz [facility]  [facility]  [address] [place] [id number] [phone]        Note: Chart documentation was done in part with Dragon Voice Recognition software.  Although reviewed after completion, some word and grammatical errors may remain. Please contact author for any clarification or concerns.

## 2024-01-05 ENCOUNTER — OFFICE VISIT (OUTPATIENT)
Dept: FAMILY MEDICINE | Facility: CLINIC | Age: 44
End: 2024-01-05
Payer: COMMERCIAL

## 2024-01-05 VITALS
RESPIRATION RATE: 18 BRPM | TEMPERATURE: 97.9 F | OXYGEN SATURATION: 95 % | HEART RATE: 89 BPM | SYSTOLIC BLOOD PRESSURE: 132 MMHG | DIASTOLIC BLOOD PRESSURE: 79 MMHG

## 2024-01-05 DIAGNOSIS — J01.00 ACUTE MAXILLARY SINUSITIS, RECURRENCE NOT SPECIFIED: Primary | ICD-10-CM

## 2024-01-05 LAB
DEPRECATED S PYO AG THROAT QL EIA: NEGATIVE
GROUP A STREP BY PCR: NOT DETECTED

## 2024-01-05 PROCEDURE — 87635 SARS-COV-2 COVID-19 AMP PRB: CPT | Mod: ORL | Performed by: NURSE PRACTITIONER

## 2024-01-05 PROCEDURE — 87651 STREP A DNA AMP PROBE: CPT | Mod: ORL | Performed by: NURSE PRACTITIONER

## 2024-01-05 ASSESSMENT — ENCOUNTER SYMPTOMS
COUGH: 1
SINUS PRESSURE: 1
SHORTNESS OF BREATH: 0
SORE THROAT: 1
FATIGUE: 1
FEVER: 0
CHILLS: 0

## 2024-01-05 NOTE — PROGRESS NOTES
Today's Date: Jan 5, 2024     Patient Patricia Levi 1980 presents to the clinic today to address   Chief Complaint   Patient presents with    Sinus Problem     Sinus congestion and pressure, fatigue  has been on going but symptoms change, was here on the 18th, felt better but is now sick again               SUBJECTIVE     History of Present Illness:      43-year-old female presents to clinic to discuss sinus congestion.  Patient was last seen by me on 12/18/2023 where she was diagnosed with subacute bronchitis after an upper respiratory infection that started at the end of November (please see 12/18/2023 note for full HPI/plan).  Briefly, she was started on albuterol inhaler and Medrol Dosepak due to right lower lobe wheezing.  Today, patient reports that she initially felt better after 12/18/23 appt as her cough improved after completing the steroid pack.  However, for the past 14 days or so patient has had fatigue, sinus pressure, congestion, intermittent dry cough with intermittent phlegm, and a sore throat (sore throat has resolved).  She currently denies fevers or chills.  She denies shortness of breath.  She reports that her  test positive for COVID last week (she has tested negative for COVID 4 times via home antigen test and her kids have tested negative as well). Essentially, patient feels like she has been sick since late of November of last year. No other acute concerns/symptoms at time of exam.            Review of Systems   Constitutional:  Positive for fatigue. Negative for chills and fever.   HENT:  Positive for congestion, sinus pressure and sore throat (resolved).    Respiratory:  Positive for cough. Negative for shortness of breath.    Constitutional, HEENT, cardiovascular, pulmonary, gi and gu systems are negative, except as otherwise noted.      Allergies   Allergen Reactions    Pcn [Penicillins] Rash     Noted when child        Current Outpatient Medications   Medication  Instructions    albuterol (PROAIR HFA/PROVENTIL HFA/VENTOLIN HFA) 108 (90 Base) MCG/ACT inhaler 2 puffs, Inhalation, EVERY 6 HOURS PRN    cetirizine (ZYRTEC) 10 mg, Oral, DAILY    guaiFENesin-codeine (ROBITUSSIN AC) 100-10 MG/5ML solution 5-10 mLs, Oral, EVERY 4 HOURS PRN    methylPREDNISolone (MEDROL DOSEPAK) 4 MG tablet therapy pack Follow package instructions       Past Medical History:   Diagnosis Date    Adjustment disorder     situational during sons illness leukemia     Asthma     Migraine headache     Seasonal allergies         Family History   Problem Relation Age of Onset    Heart Disease Maternal Grandfather         MI    Alcohol/Drug Paternal Grandfather         alcoholism    Hypertension Mother     Depression Sister     Dementia Paternal Grandmother     Anxiety Disorder Sister     Substance Abuse Sister     Leukemia Son         acute lymphoblastic leukemia     Glaucoma No family hx of     Macular Degeneration No family hx of         Social History     Tobacco Use    Smoking status: Former     Years: 5     Types: Cigarettes     Quit date: 2006     Years since quittin.0    Smokeless tobacco: Never   Substance Use Topics    Alcohol use: No     Comment: occ    Drug use: No        History   Sexual Activity    Sexual activity: Yes    Partners: Male    Birth control/ protection: None            2018    10:25 AM 2018     4:30 PM   PHQ   PHQ-9 Total Score 0 0   Q9: Thoughts of better off dead/self-harm past 2 weeks Not at all Not at all        Immunization History   Administered Date(s) Administered    COVID-19 MONOVALENT 12+ (Pfizer) 2021, 2021    COVID-19 Monovalent 18+ (Moderna) 2021    Influenza Vaccine >6 months,quad, PF 2018    TDAP (Adacel,Boostrix) 2009    TDAP Vaccine (Boostrix) 2018                 OBJECTIVE     /79 (BP Location: Left arm, Patient Position: Sitting, Cuff Size: Adult Regular)   Pulse 89   Temp 97.9  F (36.6  C) (Oral)    Resp 18   SpO2 94%      Labs:  Lab Results   Component Value Date    WBC 14.8 (H) 08/18/2018    HGB 11.8 08/19/2018    HCT 39.3 08/18/2018     08/18/2018    CHOL 142 03/24/2017    TRIG 69 03/24/2017    HDL 48 (L) 03/24/2017    ALT 37 03/24/2017    AST 27 03/24/2017     03/24/2017    BUN 8 03/24/2017    CO2 29 03/24/2017    TSH 0.74 03/24/2017        Physical Exam  Constitutional:       General: She is not in acute distress.     Appearance: She is not ill-appearing.   HENT:      Right Ear: A middle ear effusion (Nonsuppurative) is present.      Left Ear: A middle ear effusion (Nonsuppurative) is present.      Nose: Congestion present.      Right Sinus: Maxillary sinus tenderness present.      Left Sinus: Maxillary sinus tenderness present.      Mouth/Throat:      Pharynx: Posterior oropharyngeal erythema (Mild) present. No oropharyngeal exudate.   Eyes:      Extraocular Movements: Extraocular movements intact.   Cardiovascular:      Rate and Rhythm: Normal rate and regular rhythm.      Heart sounds: Normal heart sounds. No murmur heard.  Pulmonary:      Effort: Pulmonary effort is normal. No respiratory distress.      Breath sounds: Examination of the right-lower field reveals rales. Rales (Faint) present. No wheezing or rhonchi.   Musculoskeletal:      Cervical back: Neck supple.      Right lower leg: No edema.      Left lower leg: No edema.   Lymphadenopathy:      Cervical: No cervical adenopathy.   Neurological:      General: No focal deficit present.      Mental Status: She is alert.   Psychiatric:         Thought Content: Thought content normal.         Judgment: Judgment normal.               ASSESSMENT/PLAN     1. Acute maxillary sinusitis, recurrence not specified    This is a pleasant 43-year-old female presents to clinic to discuss sinus congestion.  Patient reports that she has felt sick since late November 2023 when she developed her first initial URI.  Current symptoms and physical exam  appear consistent with sinusitis.  She had faint right lower lobe Rales, which is also where her wheezing was present during her last exam.  In the absence of fever/chills, will hold off on chest x-ray at this time in the interest of avoiding excess radiation (should patient request a CXR for reassurance, we will gladly place an order).  We will check strep and COVID considering patient had a sore throat initially. If COVID is negative, then we will start antibiotics. In the interim, advised Cough/deep breathing exercises to help with RLL rales.     - Streptococcus A Rapid Screen w/Reflex to PCR - Clinic Collect  - Symptomatic COVID-19 Virus (Coronavirus) by PCR Nose      Education provided on at-home supportive measures including cough/deep breathing.    Follow-Up:  - Follow up in as needed, or if symptoms worsen or fail to improve.  Disposition pending results.      Options for treatment and follow-up care were reviewed with the patient. Patient engaged in the decision making process and verbalized understanding of the options discussed and agreed with the final plan.  AVS printed and given to patient.    ESTEE Ortiz [facility]  [facility]  [address] [place] [id number]  [phone]        Note: Chart documentation was done in part with Dragon Voice Recognition software.  Although reviewed after completion, some word and grammatical errors may remain. Please contact author for any clarification or concerns.

## 2024-01-05 NOTE — NURSING NOTE
ROOM:1  ANITRA BERMUDEZ    Preferred Name: Patricia     How did you hear about us?  Current Patient    43 year old  Chief Complaint   Patient presents with     Sinus Problem     Sinus congestion and pressure, fatigue  has been on going but symptoms change, was here on the , felt better but is now sick again         Blood pressure 132/79, pulse 89, temperature 97.9  F (36.6  C), temperature source Oral, resp. rate 18, SpO2 94%, not currently breastfeeding. There is no height or weight on file to calculate BMI.  BP completed using cuff size:        Patient Active Problem List   Diagnosis     IUD (intrauterine device) in place- Mirena 2019       Wt Readings from Last 2 Encounters:   23 84.6 kg (186 lb 6.4 oz)   22 79.4 kg (175 lb 1.6 oz)     BP Readings from Last 3 Encounters:   24 132/79   23 134/87   22 107/64       Allergies   Allergen Reactions     Pcn [Penicillins] Rash     Noted when child       Current Outpatient Medications   Medication     albuterol (PROAIR HFA/PROVENTIL HFA/VENTOLIN HFA) 108 (90 Base) MCG/ACT inhaler     cetirizine (ZYRTEC) 10 MG tablet     guaiFENesin-codeine (ROBITUSSIN AC) 100-10 MG/5ML solution     methylPREDNISolone (MEDROL DOSEPAK) 4 MG tablet therapy pack     Current Facility-Administered Medications   Medication     levonorgestrel (MIRENA) 20 MCG/24HR IUD 20 mcg       Social History     Tobacco Use     Smoking status: Former     Years: 5     Types: Cigarettes     Quit date: 2006     Years since quittin.0     Smokeless tobacco: Never   Substance Use Topics     Alcohol use: No     Comment: occ     Drug use: No       Honoring Choices - Health Care Directive Guide offered to patient at time of visit.    Health Maintenance Due   Topic Date Due     ADVANCE CARE PLANNING  Never done     HEPATITIS C SCREENING  Never done     HPV TEST  2020     PAP  2020     YEARLY PREVENTIVE VISIT  2023     COVID-19 Vaccine ( season)  09/01/2023       Immunization History   Administered Date(s) Administered     COVID-19 MONOVALENT 12+ (Pfizer) 01/05/2021, 01/26/2021     COVID-19 Monovalent 18+ (Moderna) 12/14/2021     Influenza Vaccine >6 months,quad, PF 11/05/2018     TDAP (Adacel,Boostrix) 12/14/2009     TDAP Vaccine (Boostrix) 06/07/2018       Lab Results   Component Value Date    PAP NIL 04/06/2005       Recent Labs   Lab Test 03/24/17  1032   LDL 80   HDL 48*   TRIG 69   ALT 37   CR 0.64   GFRESTIMATED >90  Non  GFR Calc     GFRESTBLACK >90   GFR Calc     ALBUMIN 4.1   POTASSIUM 3.8   TSH 0.74           1/5/2024     8:01 AM 12/18/2023     2:48 PM   PHQ-2 ( 1999 Pfizer)   Q1: Little interest or pleasure in doing things 0 0   Q2: Feeling down, depressed or hopeless 0 0   PHQ-2 Score 0 0           1/31/2018    10:25 AM 9/28/2018     4:30 PM   PHQ-9 SCORE   PHQ-9 Total Score 0 0           1/31/2018    10:25 AM 9/28/2018     4:30 PM   OLIVIA-7 SCORE   Total Score 0 0            No data to display                Aydee Taylor, EMT    January 5, 2024 8:03 AM

## 2024-01-06 ENCOUNTER — MYC MEDICAL ADVICE (OUTPATIENT)
Dept: FAMILY MEDICINE | Facility: CLINIC | Age: 44
End: 2024-01-06

## 2024-01-06 DIAGNOSIS — J01.00 ACUTE MAXILLARY SINUSITIS, RECURRENCE NOT SPECIFIED: Primary | ICD-10-CM

## 2024-01-06 LAB — SARS-COV-2 RNA RESP QL NAA+PROBE: NEGATIVE

## 2024-01-06 RX ORDER — DOXYCYCLINE HYCLATE 100 MG
100 TABLET ORAL 2 TIMES DAILY
Qty: 20 TABLET | Refills: 0 | Status: SHIPPED | OUTPATIENT
Start: 2024-01-06 | End: 2024-01-08 | Stop reason: SINTOL

## 2024-01-08 RX ORDER — CEFDINIR 300 MG/1
300 CAPSULE ORAL 2 TIMES DAILY
Qty: 20 CAPSULE | Refills: 0 | Status: SHIPPED | OUTPATIENT
Start: 2024-01-08 | End: 2024-05-16

## 2024-03-17 ENCOUNTER — HEALTH MAINTENANCE LETTER (OUTPATIENT)
Age: 44
End: 2024-03-17

## 2024-05-13 ENCOUNTER — TELEPHONE (OUTPATIENT)
Dept: INTERNAL MEDICINE | Facility: CLINIC | Age: 44
End: 2024-05-13
Payer: COMMERCIAL

## 2024-05-13 NOTE — TELEPHONE ENCOUNTER
"----- Message from Herlinda Ly MD sent at 5/13/2024  8:13 AM CDT -----  Regarding: Scheduling Clarification  Hi,    Just wanted to check in -- this patient is currently scheduled with me tomorrow for \"follow up -- previously patient of Dr. Donis\". I wondered if this means the patient is trying to establish care with a new provider since Dr. Donis is graduating? If so, would you be able to call her and schedule her with another provider? I am graduating as well next month. Otherwise, if she was just coming in and doesn't mind that there won't be continuity with me -- that is totally fine. I just wanted to make sure not to waste the patient's time if she was hoping to establish with a new provider.     Thanks!  Herlinda  "

## 2024-05-16 ENCOUNTER — OFFICE VISIT (OUTPATIENT)
Dept: INTERNAL MEDICINE | Facility: CLINIC | Age: 44
End: 2024-05-16
Payer: COMMERCIAL

## 2024-05-16 VITALS
HEART RATE: 71 BPM | DIASTOLIC BLOOD PRESSURE: 77 MMHG | OXYGEN SATURATION: 99 % | BODY MASS INDEX: 30.18 KG/M2 | SYSTOLIC BLOOD PRESSURE: 118 MMHG | WEIGHT: 188.7 LBS

## 2024-05-16 DIAGNOSIS — Z86.2 HISTORY OF LEUKOCYTOSIS: ICD-10-CM

## 2024-05-16 DIAGNOSIS — R06.83 SNORING: Primary | ICD-10-CM

## 2024-05-16 DIAGNOSIS — Z13.29 SCREENING FOR THYROID DISORDER: ICD-10-CM

## 2024-05-16 DIAGNOSIS — Z13.220 SCREENING FOR LIPID DISORDERS: ICD-10-CM

## 2024-05-16 PROCEDURE — 99213 OFFICE O/P EST LOW 20 MIN: CPT

## 2024-05-16 NOTE — PROGRESS NOTES
"  Assessment & Plan     Snoring  Worsened snoring over the past few years. She is interested in a sleep medicine referral which was placed.  - Adult Sleep Eval & Management Referral    BMI 30.0-30.9,adult  BMI 30.18, she tries to exercise at least 3 times per week and tolerates this well. Working on managing diet though notes difficulty losing weight. Recommend increasing fiber and protein intake, ensuring adequate meals during the day, continued exercise. She does not have any known comorbidity, no recent blood sugar screening. Lipids last completed in 2017, LDL 80. She is interested in learning more about medication therapies. No history of cardiovascular disease, she does have rare migraine headaches. Discussed medication options, though insurance coverage can be limiting.. She is an [employer] employee, but appears she has [employer]. She will look into the employee weight management program, referral placed. Discussed she can also schedule follow-up with me if she prefers to discuss further.   - Med Therapy Management Referral  - Comprehensive metabolic panel (BMP + Alb, Alk Phos, ALT, AST, Total. Bili, TP)    History of leukocytosis  History of leukocytosis, appears this was in pregnancy. Will check CBC with next lab draw.  - CBC with platelets and differential    Screening for lipid disorders  Lipids last checked in 2017. Due for repeat screening.  - Lipid panel reflex to direct LDL Non-fasting    Screening for thyroid disorder  Will screen for thyroid disorder.  - TSH with free T4 reflex      BMI  Estimated body mass index is 30.18 kg/m  as calculated from the following:    Height as of 12/18/23: 1.684 m (5' 6.3\").    Weight as of this encounter: 85.6 kg (188 lb 11.2 oz).   Weight management plan: Discussed healthy diet and exercise guidelines     No follow-ups on file.    Everardo Gomez is a 43 year old, presenting for the following health issues:  Follow Up and Establish Care      5/16/2024     9:15 " AM   Additional Questions   Roomed by MR     History of Present Illness       Reason for visit:  Possible sleep apnea, aging questions, weight  Symptom onset:  More than a month  Symptoms include:  Snoring very loudly at night. Have woken up to choking feeling. Can not seem to lose and maintain weight.  Symptom intensity:  Moderate  Symptom progression:  Staying the same  Had these symptoms before:  Yes  Has tried/received treatment for these symptoms:  No    She eats 2-3 servings of fruits and vegetables daily.She consumes 1 sweetened beverage(s) daily.She exercises with enough effort to increase her heart rate 30 to 60 minutes per day.  She exercises with enough effort to increase her heart rate 4 days per week.   She is taking medications regularly.     Patricia presents to the clinic today to discuss snoring and weight loss.    She reports her  is worried about her and her snoring. Getting worse over the past few years. She is interested in a sleep medicine referral.     She is concerned about weight. She has done keto and medi-fast type eating which have worked, though can be difficult to sustain. She exercises 3 times per week, joined [employer] recently. Tolerates exercise well though feels like she is unable to lose any weight. Typical diet as the following:    Breakfast: egg, wheat toast  Lunch: grazes typically, egg, broccoli, keto thin bagel with cream cheese or salad with chicken  Dinner: tortellini with grilled zucchini, sausage    She notes that she has two kids, ages 5 and 13. Evenings can be busy with her kids' sports. Sometimes will eat take away or stadium food on sports nights.       Review of Systems  Constitutional, HEENT, cardiovascular, pulmonary, gi and gu systems are negative, except as otherwise noted.      Objective    /77 (BP Location: Right arm, Patient Position: Sitting, Cuff Size: Adult Regular)   Pulse 71   Wt 85.6 kg (188 lb 11.2 oz)   SpO2 99%   BMI 30.18 kg/m    Body  mass index is 30.18 kg/m .  Physical Exam   GENERAL: alert and no distress  RESP: lungs clear to auscultation - no rales, rhonchi or wheezes  CV: regular rate and rhythm, normal S1 S2, no S3 or S4, no murmur, click or rub  PSYCH: mentation appears normal, affect normal/bright        Signed Electronically by: Olya Yu NP

## 2024-06-03 ENCOUNTER — ALLIED HEALTH/NURSE VISIT (OUTPATIENT)
Dept: HEMATOLOGY | Facility: CLINIC | Age: 44
End: 2024-06-03
Payer: COMMERCIAL

## 2024-06-03 DIAGNOSIS — Z86.2 HISTORY OF LEUKOCYTOSIS: ICD-10-CM

## 2024-06-03 DIAGNOSIS — Z13.29 SCREENING FOR THYROID DISORDER: ICD-10-CM

## 2024-06-03 DIAGNOSIS — Z13.220 SCREENING FOR LIPID DISORDERS: ICD-10-CM

## 2024-06-03 LAB
ALBUMIN SERPL BCG-MCNC: 4.5 G/DL (ref 3.5–5.2)
ALP SERPL-CCNC: 67 U/L (ref 40–150)
ALT SERPL W P-5'-P-CCNC: 33 U/L (ref 0–50)
ANION GAP SERPL CALCULATED.3IONS-SCNC: 11 MMOL/L (ref 7–15)
AST SERPL W P-5'-P-CCNC: 48 U/L (ref 0–45)
BASOPHILS # BLD AUTO: 0.1 10E3/UL (ref 0–0.2)
BASOPHILS NFR BLD AUTO: 1 %
BILIRUB SERPL-MCNC: 0.3 MG/DL
BUN SERPL-MCNC: 14.4 MG/DL (ref 6–20)
CALCIUM SERPL-MCNC: 9.1 MG/DL (ref 8.6–10)
CHLORIDE SERPL-SCNC: 99 MMOL/L (ref 98–107)
CHOLEST SERPL-MCNC: 161 MG/DL
CREAT SERPL-MCNC: 0.7 MG/DL (ref 0.51–0.95)
DEPRECATED HCO3 PLAS-SCNC: 26 MMOL/L (ref 22–29)
EGFRCR SERPLBLD CKD-EPI 2021: >90 ML/MIN/1.73M2
EOSINOPHIL # BLD AUTO: 0.3 10E3/UL (ref 0–0.7)
EOSINOPHIL NFR BLD AUTO: 4 %
ERYTHROCYTE [DISTWIDTH] IN BLOOD BY AUTOMATED COUNT: 11.7 % (ref 10–15)
FASTING STATUS PATIENT QL REPORTED: NO
FASTING STATUS PATIENT QL REPORTED: NO
GLUCOSE SERPL-MCNC: 95 MG/DL (ref 70–99)
HCT VFR BLD AUTO: 41.9 % (ref 35–47)
HDLC SERPL-MCNC: 46 MG/DL
HGB BLD-MCNC: 14.9 G/DL (ref 11.7–15.7)
IMM GRANULOCYTES # BLD: 0 10E3/UL
IMM GRANULOCYTES NFR BLD: 0 %
LDLC SERPL CALC-MCNC: 78 MG/DL
LYMPHOCYTES # BLD AUTO: 2.3 10E3/UL (ref 0.8–5.3)
LYMPHOCYTES NFR BLD AUTO: 26 %
MCH RBC QN AUTO: 30.9 PG (ref 26.5–33)
MCHC RBC AUTO-ENTMCNC: 35.6 G/DL (ref 31.5–36.5)
MCV RBC AUTO: 87 FL (ref 78–100)
MONOCYTES # BLD AUTO: 0.7 10E3/UL (ref 0–1.3)
MONOCYTES NFR BLD AUTO: 8 %
NEUTROPHILS # BLD AUTO: 5.5 10E3/UL (ref 1.6–8.3)
NEUTROPHILS NFR BLD AUTO: 61 %
NONHDLC SERPL-MCNC: 115 MG/DL
NRBC # BLD AUTO: 0 10E3/UL
NRBC BLD AUTO-RTO: 0 /100
PLATELET # BLD AUTO: 376 10E3/UL (ref 150–450)
POTASSIUM SERPL-SCNC: 4.7 MMOL/L (ref 3.4–5.3)
PROT SERPL-MCNC: 7.5 G/DL (ref 6.4–8.3)
RBC # BLD AUTO: 4.82 10E6/UL (ref 3.8–5.2)
SODIUM SERPL-SCNC: 136 MMOL/L (ref 135–145)
TRIGL SERPL-MCNC: 187 MG/DL
TSH SERPL DL<=0.005 MIU/L-ACNC: 1.25 UIU/ML (ref 0.3–4.2)
WBC # BLD AUTO: 9 10E3/UL (ref 4–11)

## 2024-06-03 PROCEDURE — 36415 COLL VENOUS BLD VENIPUNCTURE: CPT

## 2024-06-03 PROCEDURE — 85025 COMPLETE CBC W/AUTO DIFF WBC: CPT

## 2024-06-03 PROCEDURE — 80053 COMPREHEN METABOLIC PANEL: CPT

## 2024-06-03 PROCEDURE — 80061 LIPID PANEL: CPT

## 2024-06-03 PROCEDURE — 84443 ASSAY THYROID STIM HORMONE: CPT

## 2024-06-05 ENCOUNTER — VIRTUAL VISIT (OUTPATIENT)
Dept: PHARMACY | Facility: CLINIC | Age: 44
End: 2024-06-05
Payer: COMMERCIAL

## 2024-06-05 DIAGNOSIS — J30.2 SEASONAL ALLERGIC RHINITIS, UNSPECIFIED TRIGGER: ICD-10-CM

## 2024-06-05 PROCEDURE — 99607 MTMS BY PHARM ADDL 15 MIN: CPT | Performed by: PHARMACIST

## 2024-06-05 PROCEDURE — 99605 MTMS BY PHARM NP 15 MIN: CPT | Performed by: PHARMACIST

## 2024-06-05 RX ORDER — BUPROPION HYDROCHLORIDE 100 MG/1
TABLET, EXTENDED RELEASE ORAL
Qty: 162 TABLET | Refills: 0 | Status: SHIPPED | OUTPATIENT
Start: 2024-06-05

## 2024-06-05 RX ORDER — NALTREXONE HYDROCHLORIDE 50 MG/1
TABLET, FILM COATED ORAL
Qty: 41 TABLET | Refills: 0 | Status: SHIPPED | OUTPATIENT
Start: 2024-06-05

## 2024-06-05 RX ORDER — TRETINOIN 0.5 MG/G
CREAM TOPICAL AT BEDTIME
COMMUNITY
Start: 2023-11-28

## 2024-06-05 NOTE — PROGRESS NOTES
Medication Therapy Management (MTM) Encounter    ASSESSMENT:                            Medication Adherence/Access: No issues identified    Weight Management:   Patient with BMI over 30 and has been unable to lose weight despite lifestyle modification. Patient would be a candidate for pharmacotherapy. Discussed options of phentermine, topiramate, naltrexone +/- bupropion, and Orlistat.     From review of side effects profiles will avoid phentermine at this time as patient reports history if increased nervousness/heart racing with over-the-counter Pseudofed products (stimulant concern). Then ultimately leaned towards Contrave like product given topiramate may be more sedating.     Allergic rhinitis:   Stable, patient prefers to continue on current regimen.    PLAN:                            Start bupropion + naltrexone for weight loss.    1) Starting dose:  Start Bupropion  mg tablet: 1 tablet by mouth once daily in morning   Start Naltrexone 50 mg tablet: one-quarter (1/4) tablet by mouth once daily in morning       2) Then increase to:   Increase to Bupropion  mg tablet: 1 tablet by mouth twice daily (morning and early evening)  Increase to Naltrexone 50 mg tablet: one-quarter (1/4) tablet twice daily (morning  and early evening)      3) Then increase to:   Increase to Bupropion  mg tablet: 2 tablets every morning and 1 tablet in the early evening  Increase to Naltrexone 50 mg tablet: one-half (1/2) tablet in the morning and one-quarter (1/4) tablet in the early evening     4) And finally, the maintenance dose of:  Increase to Bupropion  mg tablet: 2 tablets every morning and 2 tablets in the early evening  Increase to Naltrexone 50 mg tablet: one-half (1/2) tablet in the morning and one-half (1/2) tablet in the early evening      Follow-up: Return in about 6 weeks (around 7/19/2024) for Medication Therapy Management Visit.  Will consider hepatic monitoring at next  visit.    SUBJECTIVE/OBJECTIVE:                          Patricia Levi is a 43 year old female coming in for an initial visit. She was referred to me from Olya Yu NP.      Reason for visit: weight management.    Allergies/ADRs: Reviewed in chart  Past Medical History: Reviewed in chart  Tobacco: She reports that she quit smoking about 18 years ago. Her smoking use included cigarettes. She started smoking about 23 years ago. She has never used smokeless tobacco.  Alcohol: 4-5 drinks on the weekends  Other Substance Use: none    Medication Adherence/Access: no issues reported    Weight Management   Currently none but wants to explore options for weight management. S  She has tried various diet programs and exercise. Currently she feels she I doing very well with her diet and exercise (best she has in the past) but has been unable to lose any weight.   She has found Keto diet and Metafast meal replacement was the most successful, but not sustainable due to hard to tolerate the taste. She thinks she lost 18 lbs in 3-4 months and then kept it off for a few months until gained weight back. She felt with weight loss her energy would be better too. Her goal isn't specifically a number but more so she wants to feel better, more energized.     Nutrition/Eating Habits:  Drinks a lot of water. Caffeine consists of 1 cup of coffee and diet coke in the afternoon.  Breakfast - can be hit or miss might b elow carb bagel or boiled egg or omelated or yogurt or granola.  Not consistent with her meals due to her work schedule varies a lot. Breakfast varies for this reason.  But typically she will have salad, boiled egg, rice cakes or hummus and veggies or turkey wrap for lunch. She then will have yogurt with granola as a snack. Dinner varies with the the kids schedule, they may get fastfood/takeout or home cooked food which is often made by . She shares  often cooks a lot oily foods. She has trouble with feeling  "hungry/wanting to snack after dinner and late afternoons. She would prefer to eat more low carbohydrate but not able to stay consistent with that.    Exercise/Activity:   working out more consistently 2-3 times a week which is the most active then before. She goes to [employer]. Otherwise work is more sedentary.     Medication History: None.      Wt Readings from Last 4 Encounters:   05/16/24 188 lb 11.2 oz (85.6 kg)   12/18/23 186 lb 6.4 oz (84.6 kg)   07/12/22 175 lb 1.6 oz (79.4 kg)   10/13/20 169 lb 4.8 oz (76.8 kg)     Estimated body mass index is 30.18 kg/m  as calculated from the following:    Height as of 12/18/23: 5' 6.3\" (1.684 m).    Weight as of 5/16/24: 188 lb 11.2 oz (85.6 kg).      Allergic Rhinitis:   cetirizine 10 mg twice daily  Pataday eye drops  Flonase nasal   Patient reports no current medication side effects.    Primary symptoms are nasal congestion, post-nasal drip, runny nose, sneezing, and itchy/watery eyes. Patient feels that current therapy is somewhat effective. She feels it is manageable and medication is not necessary.    Today's Vitals: There were no vitals taken for this visit.  ----------------    I spent 48 minutes with this patient today. All changes were made via collaborative practice agreement with Olya Yu NP. A copy of the visit note was provided to the patient's provider(s).    A summary of these recommendations was sent via [employer].    Hermelinda Mchugh, Edgar  Medication Therapy Management Pharmacist    Telemedicine Visit Details  Type of service:  Video Conference via [employer]  Start Time:  10:30 AM  End Time:  11:18 AM     Medication Therapy Recommendations  BMI 30.0-30.9,adult    Current Medication: buPROPion (WELLBUTRIN SR) 100 MG 12 hr tablet   Rationale: Untreated condition - Needs additional medication therapy - Indication   Recommendation: Start Medication   Status: Accepted per CPA            "

## 2024-06-06 RX ORDER — OLOPATADINE HYDROCHLORIDE 1 MG/ML
1 SOLUTION/ DROPS OPHTHALMIC 2 TIMES DAILY
COMMUNITY

## 2024-06-06 RX ORDER — CETIRIZINE HYDROCHLORIDE 10 MG/1
10 TABLET ORAL 2 TIMES DAILY
COMMUNITY

## 2024-06-06 RX ORDER — FLUTICASONE PROPIONATE 50 MCG
1 SPRAY, SUSPENSION (ML) NASAL DAILY
COMMUNITY

## 2024-06-07 NOTE — PATIENT INSTRUCTIONS
Recommendations from today's MTM visit:                                                      You will take the naltrexone and bupropion together. With each dose increase please stay on for at least 1 week. I typically recommend 1-2 weeks based on how you are tolerating the medications.      1) Starting dose:  Start Bupropion  mg tablet: 1 tablet by mouth once daily in morning   Start Naltrexone 50 mg tablet: one-quarter (1/4) tablet by mouth once daily in morning       2) Then increase to:   Increase to Bupropion  mg tablet: 1 tablet by mouth twice daily (morning and early evening)  Increase to Naltrexone 50 mg tablet: one-quarter (1/4) tablet twice daily (morning  and early evening)      3) Then increase to:   Increase to Bupropion  mg tablet: 2 tablets every morning and 1 tablet in the early evening  Increase to Naltrexone 50 mg tablet: one-half (1/2) tablet in the morning and one-quarter (1/4) tablet in the early evening     4) And finally, the maintenance dose of:  Increase to Bupropion  mg tablet: 2 tablets every morning and 2 tablets in the early evening  Increase to Naltrexone 50 mg tablet: one-half (1/2) tablet in the morning and one-half (1/2) tablet in the early evening     --     Contrave is a medication that contains (naltrexone and bupropion). We will start the two medication individually as your insurance will not cover the brand name weight loss medications. The Bupropion helps lessen appetite and the Naltrexone works by blocking certain receptors in the brain and curbing cravings.       Common side effects: nausea; constipation; headache; vomiting; dizziness; diarrhea; trouble sleeping; and dry mouth.      Try to avoid or minimize alcohol use while on these medications as it can increase the risk of side effects. The major concern I have is the risk of seizures if have large consumption of alcohol at once.      Naltrexone WARNING: This medication blocks the action of opioid  "type pain medications. If you routinely take any medication like Codeine, Oxycontin, Percocet, Morphine, Dilaudid or Methodone, do not take this until you have talked with weight management staff.      For Surgery: If you are planning on having an elective surgery, you should start titrating yourself off Contrave 4 weeks prior to surgery. This would entail decreasing the same way you started the medication, by taking one tablet less per week for 4 weeks, until you are able to stop the medication. If you need to stop it quicker, you can take 1 tablet in the morning and 1 tablet in the evening for 2 weeks, and then stop the medication. Please don't hesitate to call if you have any questions regarding this.        Follow-up: 7/19 in clinic [facility], will consider recheck liver enzymes after we meet.    It was great speaking with you today.  I value your experience and would be very thankful for your time in providing feedback in our clinic survey. In the next few days, you may receive an email or text message from [email or web address] with a link to a survey related to your  clinical pharmacist.\"     To schedule another MTM appointment, please call the clinic directly or you may call the MTM scheduling line at [phone] or toll-free at [phone].     My Clinical Pharmacist's contact information:                                                      Please feel free to contact me with any questions or concerns you have.      Hermelinda Mchugh, PharmD  Medication Therapy Management Pharmacist   "

## 2024-09-05 ENCOUNTER — VIRTUAL VISIT (OUTPATIENT)
Dept: SLEEP MEDICINE | Facility: CLINIC | Age: 44
End: 2024-09-05
Payer: COMMERCIAL

## 2024-09-05 VITALS — BODY MASS INDEX: 30.73 KG/M2 | HEIGHT: 64 IN | WEIGHT: 180 LBS

## 2024-09-05 DIAGNOSIS — R06.83 SNORING: Primary | ICD-10-CM

## 2024-09-05 DIAGNOSIS — R41.89 BRAIN FOG: ICD-10-CM

## 2024-09-05 DIAGNOSIS — E66.811 OBESITY (BMI 30.0-34.9): ICD-10-CM

## 2024-09-05 DIAGNOSIS — G47.00 FREQUENT NOCTURNAL AWAKENING: ICD-10-CM

## 2024-09-05 PROCEDURE — 99203 OFFICE O/P NEW LOW 30 MIN: CPT | Mod: 95

## 2024-09-05 ASSESSMENT — PAIN SCALES - GENERAL: PAINLEVEL: NO PAIN (0)

## 2024-09-05 NOTE — PROGRESS NOTES
"Virtual Visit Details  Type of service: Video Visit   Start time: 11:00 AM  End time: 11:20 AM  Originating Location (pt. Location): Home  Distant Location (provider location): Off-site  Platform used for Video Visit: [facility]    Outpatient Sleep Medicine Consultation:      Name: Patricia Levi MRN# [id number]   Age: 43 year old YOB: 1980     Date of Consultation: September 5, 2024  Consultation is requested by: Olya Yu NP  [address] [facility] [id number]  [place] [address] Olya Yu  Primary care provider: Olya Yu       Reason for Sleep Consult:     Patricia Levi is sent by Olya Yu for a sleep consultation regarding snoring.    Patient s Reason for visit  Patricia Levi main reason for visit: Snoring becoming worse. Have woken up to feeling like I cant get air.  Patient states problem(s) started: 2018  Patricia Levi's goals for this visit: To see what I can do about my snoring that has gotten worse. To find out if I have sleep apnea or it could turn into that.           Assessment and Plan:     Summary Sleep Diagnoses:  (R06.83) Snoring (primary encounter diagnosis) (R41.89) Brain fog (G47.00) Frequent nocturnal awakening (E66.9) Obesity (BMI 30.0-34.9)    Comment: Patricia is a 43-year-old female who presents to the sleep clinic with symptoms of snoring. She has been snoring for at least 6 years and snoring has gotten worse over the last two years. Snoring is a lot worse in the supine position. She has had a couple gasp arousals. Her  has never witnessed apnea, but he mentions her breathing seems \"weird\". She does not feel rested in the morning. She usually has pretty good energy throughout the day as long as she is moving, but her brain feels foggy throughout the day. Patricia also reports difficulty losing weight. Her STOP-BANG is 2/8- snoring and tiredness.    Plan: HST-Home Sleep Apnea Test - Noxturnal Returnable  Patricia is at low to intermediate risk for obstructive sleep apnea. " "Recommended a home sleep study. We reviewed treatment options for obstructive sleep apnea including PAP therapy, mandibular advancement device, positional therapy, surgery, weight management, and hypoglossal nerve stimulator. Patricia also mentions her right nasal passage always feels like it is occluded or difficult to breathe through. We will consider an ENT referral depending on the results of her study.    Comorbid Diagnoses:  Obesity     Summary Recommendations:  Orders Placed This Encounter   Procedures    HST-Home Sleep Apnea Test - Noxturnal Returnable     Summary Counseling:    Sleep Testing Reviewed  Obstructive Sleep Apnea Reviewed  Complications of Untreated Sleep Apnea Reviewed    Patient will follow up approximately 3 months after sleep study. Results of the study will be reviewed via Aras and treatment will be initiated prior to the follow up visit.   ESTEE Morales CNP    Total time spent reviewing medical records, history and physical examination, review of previous testing and interpretation as well as documentation on this date: 37 minutes     CC: Olya Yu NP          History of Present Illness:     Patricia presents to the sleep clinic with symptoms of snoring. She has been snoring for at least 6 years and snoring has gotten worse over the last two years. Snoring is a lot worse in the supine position. She has had a couple gasp arousals. Her  has never witnessed apnea, but he mentions her breathing seems \"weird\". She does not feel rested in the morning. She usually has pretty good energy throughout the day as long as she is moving. Her brain feels foggy throughout the day.     Past Sleep Evaluations: None     SLEEP-WAKE SCHEDULE:     Work/School Days: Patient goes to school/work: Yes   Usually gets into bed at 11  Takes patient about 5 minutes to fall asleep  Has trouble falling asleep Rarely nights per week  Wakes up in the middle of the night 4 times  Wakes up due to Snorting self " awake;External stimuli (bed partner, pets, noise, etc);Use the bathroom x 0-1, the animals and children can wake her. She feels like she is a very light sleeper.   She has trouble falling back asleep 2 times a week.   It usually takes Anywhere from 5 minutes to an hour to get back to sleep  Patient is usually up at 6:30  Uses alarm: Yes    Weekends/Non-work Days/All Other Days:  Usually gets into bed at 11   Takes patient about Five minutes to fall asleep  Patient is usually up at 8  Uses alarm: Yes    Sleep Need  Patient gets 6 hours sleep on average   Patient thinks she needs about 8 hours sleep    Patricia Levi prefers to sleep in this position(s): Side She does not wake on her back often. Snoring is a lot worse on her back.   Patient states they do the following activities in bed: Read;Use phone, computer, or tablet    Naps  Patient takes a purposeful nap 1 times a week and naps are usually 15 minutes in duration  She feels better after a nap: Yes  She dozes off unintentionally 0 days per week  Patient has had a driving accident or near-miss due to sleepiness/drowsiness: No    SLEEP DISRUPTIONS:    Breathing/Snoring  Patient snores: Yes  Other people complain about her snoring: Yes  Patient has been told she stops breathing in her sleep: No  She has issues with the following: Infrequent morning headaches. She wakes with a sore throat in the morning.      Movement:  Patient gets pain, discomfort, with an urge to move: No Denies restless legs.   It happens when she is resting:     It happens more at night:     Patient has been told she kicks her legs at night: No      Behaviors in Sleep:  Patricia eLvi has experienced the following behaviors while sleeping: She can have occasional hypnic jerks.   She has experienced sudden muscle weakness during the day: No  Pt denies bruxism, sleep talking, sleep walking, and dream enactment behavior. Pt denies sleep paralysis, hypnagogue and cataplexy.    Is there anything else  you would like your sleep provider to know: Everyone complains about my snoring and how loud it is. Because if that I think about it and have a hard time sleeping anticipating that I will snore. I usually wake up with a sore throat. I once woke up gasping for air.    CAFFEINE AND OTHER SUBSTANCES:    Patient consumes caffeinated beverages per day: One  Last caffeine use is usually: 9 am  List of any prescribed or over the counter stimulants that patient takes: Only sudafed when having allergy symptoms.  List of any prescribed or over the counter sleep medication patient takes: None  List of previous sleep medications that patient has tried: None  Patient drinks alcohol to help them sleep: No  Patient drinks alcohol near bedtime: No    Family History:  Patient has a family member been diagnosed with a sleep disorder: No      Social History: She lives at home with her  and children. She works for [employer].     SCALES:    EPWORTH SLEEPINESS SCALE         9/4/2024    11:29 PM    [place] Sleepiness Scale ( ANTONIO Hendrickson  [id number]<br>ESS - [place]/English - Final version - 21 Nov 07 - [facility] Research [place].)   Sitting and reading Slight chance of dozing   Watching TV Slight chance of dozing   Sitting, inactive in a public place (e.g. a theatre or a meeting) Would never doze   As a passenger in a car for an hour without a break Would never doze   Lying down to rest in the afternoon when circumstances permit Moderate chance of dozing   Sitting and talking to someone Would never doze   Sitting quietly after a lunch without alcohol Would never doze   In a car, while stopped for a few minutes in traffic Would never doze   [place] Score (MC) 4   [place] Score (Sleep) 4         INSOMNIA SEVERITY INDEX (GRAY)          9/4/2024    11:11 PM   Insomnia Severity Index (GRAY)   Difficulty falling asleep 0   Difficulty staying asleep 1   Problems waking up too early 1   How SATISFIED/DISSATISFIED are you with your CURRENT sleep  "pattern? 1   How NOTICEABLE to others do you think your sleep problem is in terms of impairing the quality of your life? 1   How WORRIED/DISTRESSED are you about your current sleep problem? 3   To what extent do you consider your sleep problem to INTERFERE with your daily functioning (e.g. daytime fatigue, mood, ability to function at work/daily chores, concentration, memory, mood, etc.) CURRENTLY? 1   GRAY Total Score 8       Guidelines for Scoring/Interpretation:  Total score categories:  0-7 = No clinically significant insomnia   8-14 = Subthreshold insomnia   15-21 = Clinical insomnia (moderate severity)  22-28 = Clinical insomnia (severe)  Used via courtesy of www.[employer].va.gov with permission from Kaden Bernal PhD., [facility]      STOP BANG         9/5/2024    10:45 AM   STOP BANG Questionnaire (  2008, the American Society of Anesthesiologists, Inc. [employer]   BMI Clinic: 30.9         GAD7        9/28/2018     4:30 PM   OLIVIA-7    1. Feeling nervous, anxious, or on edge 0   2. Not being able to stop or control worrying 0   3. Worrying too much about different things 0   4. Trouble relaxing 0   5. Being so restless that it is hard to sit still 0   6. Becoming easily annoyed or irritable 0   7. Feeling afraid, as if something awful might happen 0   OLIVIA-7 Total Score 0         CAGE-AID         No data to display                CAGE-AID reprinted with permission from the [place] Medical Journal, ANTHONY Moody. and AMY Gabriel, \"Conjoint screening questionnaires for alcohol and drug abuse\" [place] Medical Journal 94: 135-140, 1995.      PATIENT HEALTH QUESTIONNAIRE-9 (PHQ - 9)        9/28/2018     4:30 PM   PHQ-9 ([employer])   1.  Little interest or pleasure in doing things 0   2.  Feeling down, depressed, or hopeless 0   3.  Trouble falling or staying asleep, or sleeping too much 0   4.  Feeling tired or having little energy 0   5.  Poor appetite or overeating 0   6.  Feeling " bad about yourself - or that you are a failure or have let yourself or your family down 0   7.  Trouble concentrating on things, such as reading the newspaper or watching television 0   8.  Moving or speaking so slowly that other people could have noticed. Or the opposite - being so fidgety or restless that you have been moving around a lot more than usual 0   9.  Thoughts that you would be better off dead, or of hurting yourself in some way 0   PHQ-9 Total Score 0   If you checked off any problems, how difficult have these problems made it for you to do your work, take care of things at home, or get along with other people? Not difficult at all   6.  Feeling bad about yourself 0   7.  Trouble concentrating 0   8.  Moving slowly or restless 0   9.  Suicidal or self-harm thoughts 0   Difficulty at work, home, or with people Not difficult at all       Developed by Park Becker, Mirian Dalton, Louis Love and colleagues, with an educational roya from [employer] No permission required to reproduce, translate, display or distribute.        Allergies:    Allergies   Allergen Reactions    Pcn [Penicillins] Rash     Noted when child       Medications:    Current Outpatient Medications   Medication Sig Dispense Refill    Aspirin-Acetaminophen-Caffeine (EXCEDRIN PO) Take 1 tablet by mouth daily Or will take ibuprofen.      buPROPion (WELLBUTRIN SR) 100 MG 12 hr tablet Take 1 tablet daily for 7 days, then increase to 1 tablet twice daily for 7 days, then to 2 tablet in the morning + 1 tablet every evening for 7 days, and then 2 tabs twice daily thereafter. 162 tablet 0    cetirizine (ZYRTEC) 10 MG tablet Take 10 mg by mouth 2 times daily      fluticasone (FLONASE) 50 MCG/ACT nasal spray Spray 1 spray into both nostrils daily      naltrexone (DEPADE/REVIA) 50 MG tablet Take 1/4 tablet daily for 7 days, then 1/4 tablet twice daily for 7 days, then 1/2 tablet in the morning + 1/4 tablet in the evening for 7  days, and then 1/2 tablet twice daily thereafter. 41 tablet 0    olopatadine (PATANOL) 0.1 % ophthalmic solution Place 1 drop into both eyes 2 times daily      tretinoin (RETIN-A) 0.05 % external cream Apply topically at bedtime As needed         Problem List:  Patient Active Problem List    Diagnosis Date Noted    IUD (intrauterine device) in place- Mirena 2019 10/13/2020     Priority: Medium     Remove within 7 years, by 2026          Past Medical/Surgical History:  Past Medical History:   Diagnosis Date    Adjustment disorder     situational during sons illness leukemia     Asthma     Migraine headache     Seasonal allergies      Past Surgical History:   Procedure Laterality Date    DENTAL SURGERY      [place] teeth        Social History:  Social History     Socioeconomic History    Marital status:      Spouse name: Contreras    Number of children: 2    Years of education: Not on file    Highest education level: Not on file   Occupational History    Occupation: pharmacy tech     Employer: [employer]     Comment: [employer]   Tobacco Use    Smoking status: Former     Current packs/day: 0.00     Types: Cigarettes     Start date: 2001     Quit date: 2006     Years since quittin.6     Passive exposure: Past    Smokeless tobacco: Never   Vaping Use    Vaping status: Never Used   Substance and Sexual Activity    Alcohol use: Yes     Alcohol/week: 3.0 standard drinks of alcohol     Types: 3 Standard drinks or equivalent per week     Comment: 1-3 per week    Drug use: No    Sexual activity: Yes     Partners: Male     Birth control/protection: None   Other Topics Concern    Not on file   Social History Narrative    Caffeine intake/servings daily - 1    Calcium intake/servings daily - 3    Exercise 2 times weekly - describe weights    Sunscreen used - Yes    Seatbelts used - Yes    Guns stored in the home - No    Self Breast Exam - Yes    Pap test up to  date -  Yes - follows at an [facility]     Eye exam up to date -  Yes    Dental exam up to date -  Yes    DEXA scan up to date -  Not Applicable    Flex Sig/Colonoscopy up to date -  Not Applicable    Mammography up to date -  Not Applicable    Immunizations reviewed and up to date - Yes    Abuse: Current or Past (Physical, Sexual or Emotional) - No    Do you feel safe in your environment - Yes    Do you cope well with stress - Yes    Do you suffer from insomnia - No        1/17/2018     Social Determinants of Health     Financial Resource Strain: Low Risk  (12/18/2023)    Financial Resource Strain     Within the past 12 months, have you or your family members you live with been unable to get utilities (heat, electricity) when it was really needed?: No   Food Insecurity: Low Risk  (12/18/2023)    Food Insecurity     Within the past 12 months, did you worry that your food would run out before you got money to buy more?: No     Within the past 12 months, did the food you bought just not last and you didn t have money to get more?: No   Transportation Needs: Low Risk  (12/18/2023)    Transportation Needs     Within the past 12 months, has lack of transportation kept you from medical appointments, getting your medicines, non-medical meetings or appointments, work, or from getting things that you need?: No   Physical Activity: Not on file   Stress: Not on file   Social Connections: Not on file   Interpersonal Safety: Low Risk  (12/18/2023)    Interpersonal Safety     Do you feel physically and emotionally safe where you currently live?: Yes     Within the past 12 months, have you been hit, slapped, kicked or otherwise physically hurt by someone?: No     Within the past 12 months, have you been humiliated or emotionally abused in other ways by your partner or ex-partner?: No   Housing Stability: Low Risk  (12/18/2023)    Housing Stability     Do you have housing? : Yes     Are you worried about losing your housing?: No  "      Family History:  Family History   Problem Relation Age of Onset    Heart Disease Maternal Grandfather         MI    Alcohol/Drug Paternal Grandfather         alcoholism    Hypertension Mother     Depression Sister     Dementia Paternal Grandmother     Anxiety Disorder Sister     Substance Abuse Sister     Leukemia Son         acute lymphoblastic leukemia     Glaucoma No family hx of     Macular Degeneration No family hx of        Review of Systems:  A complete review of systems reviewed by me is negative with the exeption of what has been mentioned in the history of present illness.  In the last TWO WEEKS have you experienced any of the following symptoms?  Fevers: No  Night Sweats: No  Weight Gain: No  Pain at Night: No  Double Vision: No  Changes in Vision: No  Difficulty Breathing through Nose: No  Sore Throat in Morning: Yes  Dry Mouth in the Morning: Yes  Shortness of Breath Lying Flat: No  Shortness of Breath With Activity: No  Awakening with Shortness of Breath: No  Increased Cough: No  Heart Racing at Night: No  Swelling in Feet or Legs: No  Diarrhea at Night: No  Heartburn at Night: No  Urinating More than Once at Night: No  Losing Control of Urine at Night: No  Joint Pains at Night: No  Headaches in Morning: No  Weakness in Arms or Legs: No  Depressed Mood: No  Anxiety: No     Physical Examination:  Vitals: Ht 1.626 m (5' 4\")   Wt 81.6 kg (180 lb)   BMI 30.90 kg/m    BMI= Body mass index is 30.9 kg/m .    GENERAL APPEARANCE: healthy, alert, no distress, and cooperative  EYES: Eyes grossly normal to inspection  NECK: no asymmetry, masses, or scars  RESP: no increased work of breathing noted, no audible cough or wheeze   NEURO: mentation intact and speech normal  PSYCH: mentation appears normal and affect normal/bright  Mallampati Class: Not visualized over video.  Tonsillar Stage: Not visualized over video.         Data: All pertinent previous laboratory data reviewed     Recent Labs   Lab Test " "06/03/24  1434 03/24/17  1032    139   POTASSIUM 4.7 3.8   CHLORIDE 99 102   CO2 26 29   ANIONGAP 11 8   GLC 95 90   BUN 14.4 8   CR 0.70 0.64   SIDNEY 9.1 8.6       Recent Labs   Lab Test 06/03/24  1434   WBC 9.0   RBC 4.82   HGB 14.9   HCT 41.9   MCV 87   MCH 30.9   MCHC 35.6   RDW 11.7          Recent Labs   Lab Test 06/03/24  1434   PROTTOTAL 7.5   ALBUMIN 4.5   BILITOTAL 0.3   ALKPHOS 67   AST 48*   ALT 33       TSH   Date Value   06/03/2024 1.25 uIU/mL   03/24/2017 0.74 mU/L   07/31/2013 1.55 mU/L       No results found for: \"UAMP\", \"UBARB\", \"BENZODIAZEUR\", \"UCANN\", \"UCOC\", \"OPIT\", \"UPCP\"    No results found for: \"IRONSAT\", \"JY05350\", \"DANIELLA\"    No results found for: \"PH\", \"PHARTERIAL\", \"PO2\", \"FM2MLMDZCKI\", \"SAT\", \"PCO2\", \"HCO3\", \"BASEEXCESS\", \"DONOVAN\", \"BEB\"    @LABRCNTIPR(phv:4,pco2v:4,po2v:4,hco3v:4,padmaja:4,o2per:4)@    Echocardiology: No results found for this or any previous visit (from the past 4320 hour(s)).    Chest x-ray:   No results found for this or any previous visit from the past 365 days.      Chest CT:   No results found for this or any previous visit from the past 365 days.      PFT: Most Recent [facility] Pulmonary Function Testing    Jason Stover, APRN CNP 9/5/2024   "

## 2024-09-05 NOTE — PATIENT INSTRUCTIONS
"Central [phone]          MY TREATMENT INFORMATION FOR SLEEP APNEA-  Patricia Levi    DOCTOR : ESTEE Morales CNP    Am I having a sleep study at a sleep center?  --->Due to normal delays, you will be contacted within 2-4 weeks to schedule    Am I having a home sleep study?  --->Watch the video for the device you are using:    -/drop off device- https://www.Togethera.com/watch?v=yGGFBdELGhk    Frequently asked questions:  1. What is Obstructive Sleep Apnea (VONNIE)? VONNIE is the most common type of sleep apnea. Apnea means, \"without breath.\"  Apnea is most often caused by narrowing or collapse of the upper airway as muscles relax during sleep.   Almost everyone has occasional apneas. Most people with sleep apnea have had brief interruptions at night frequently for many years.  The severity of sleep apnea is related to how frequent and severe the events are.   2. What are the consequences of VONNIE? Symptoms include: feeling sleepy during the day, snoring loudly, gasping or stopping of breathing, trouble sleeping, and occasionally morning headaches or heartburn at night.  Sleepiness can be serious and even increase the risk of falling asleep while driving. Other health consequences may include development of high blood pressure and other cardiovascular disease in persons who are susceptible. Untreated VONNIE can contribute to heart disease, stroke and diabetes.   3. What are the treatment options? In most situations, sleep apnea is a lifelong disease that must be managed with daily therapy. Medications are not effective for sleep apnea and surgery is generally not considered until other therapies have been tried. Your treatment is your choice . Continuous Positive Airway (CPAP) works right away and is the therapy that is effective in nearly everyone. An oral device to hold your jaw forward is usually the next most reliable option. Other options include postioning devices (to keep you off your back), " weight loss, and surgery including a tongue pacing device. There is more detail about some of these options below.  4. Are my sleep studies covered by insurance? Although we will request verification of coverage, we advise you also check in advance of the study to ensure there is coverage.    Important tips for those choosing CPAP and similar devices  REMEMBER-IF YOU RECEIVE A CALL FROM  [phone]-->IT IS TO SETUP A DEVICE  For new devices, sign up for device RODRIGUEZ to monitor your device for your followup visits  We encourage you to utilize the [employer] rodriguez or website (https://[employer]/) to monitor your therapy progress and share the data with your healthcare team when you discuss your sleep apnea.                                                    Know your equipment:  CPAP is continuous positive airway pressure that prevents obstructive sleep apnea by keeping the throat from collapsing while you are sleeping. In most cases, the device is  smart  and can slowly self-adjusts if your throat collapses and keeps a record every day of how well you are treated-this information is available to you and your care team.  BPAP is bilevel positive airway pressure that keeps your throat open and also assists each breath with a pressure boost to maintain adequate breathing.  Special kinds of BPAP are used in patients who have inadequate breathing from lung or heart disease. In most cases, the device is  smart  and can slowly self-adjusts to assist breathing. Like CPAP, the device keeps a record of how well you are treated.  Your mask is your connection to the device. You get to choose what feels most comfortable and the staff will help to make sure if fits. Here: are some examples of the different masks that are available: Magnetic mask aids may assist with use but there are safety issues that should be addressed when considering with magnets* (see end of discussion).       Key points to remember on your journey  with sleep apnea:  Sleep study.  PAP devices often need to be adjusted during a sleep study to show that they are effective and adjusted right.  Good tips to remember: Try wearing just the mask during a quiet time during the day so your body adapts to wearing it. A humidifier is recommended for comfort in most cases to prevent drying of your nose and throat. Allergy medication from your provider may help you if you are having nasal congestion.  Getting settled-in. It takes more than one night for most of us to get used to wearing a mask. Try wearing just the mask during a quiet time during the day so your body adapts to wearing it. A humidifier is recommended for comfort in most cases. Our team will work with you carefully on the first day and will be in contact within 4 days and again at 2 and 4 weeks for advice and remote device adjustments. Your therapy is evaluated by the device each day.   Use it every night. The more you are able to sleep naturally for 7-8 hours, the more likely you will have good sleep and to prevent health risks or symptoms from sleep apnea. Even if you use it 4 hours it helps. Occasionally all of us are unable to use a medical therapy, in sleep apnea, it is not dangerous to miss one night.   Communicate. Call our skilled team on the number provided on the first day if your visit for problems that make it difficult to wear the device. Over 2 out of 3 patients can learn to wear the device long-term with help from our team. Remember to call our team or your sleep providers if you are unable to wear the device as we may have other solutions for those who cannot adapt to mask CPAP therapy. It is recommended that you sleep your sleep provider within the first 3 months and yearly after that if you are not having problems.   Use it for your health. We encourage use of CPAP masks during daytime quiet periods to allow your face and brain to adapt to the sensation of CPAP so that it will be a more  natural sensation to awaken to at night or during naps. This can be very useful during the first few weeks or months of adapting to CPAP though it does not help medically to wear CPAP during wakefulness and  should not be used as a strategy just to meet guidelines.  Take care of your equipment. Make sure you clean your mask and tubing using directions every day and that your filter and mask are replaced as recommended or if they are not working.     *Masks with magnets:  Updated Contraindications  Masks with magnetic components are contraindicated for use by patients where they, or anyone in close physical contact while using the mask, have the following:   Active medical implants that interact with magnets (i.e., pacemakers, implantable cardioverter defibrillators (ICD), neurostimulators, cerebrospinal fluid (CSF) shunts, insulin/infusion pumps)   Metallic implants/objects containing ferromagnetic material (i.e., aneurysm clips/flow disruption devices, embolic coils, stents, valves, electrodes, implants to restore hearing or balance with implanted magnets, ocular implants, metallic splinters in the eye)  Updated Warning  Keep the mask magnets at a safe distance of at least 6 inches (150 mm) away from implants or medical devices that may be adversely affected by magnetic interference. This warning applies to you or anyone in close physical contact with your mask. The magnets are in the frame and lower headgear clips, with a magnetic field strength of up to 400mT. When worn, they connect to secure the mask but may inadvertently detach while asleep.  Implants/medical devices, including those listed within contraindications, may be adversely affected if they change function under external magnetic fields or contain ferromagnetic materials that attract/repel to magnetic fields (some metallic implants, e.g., contact lenses with metal, dental implants, metallic cranial plates, screws, ollie hole covers, and bone substitute  devices). Consult your physician and  of your implant / other medical device for information on the potential adverse effects of magnetic fields.    BESIDES CPAP, WHAT OTHER THERAPIES ARE THERE?    Positioning Device  Positioning devices are generally used when sleep apnea is mild and only occurs on your back.This example shows a pillow that straps around the waist. It may be appropriate for those whose sleep study shows milder sleep apnea that occurs primarily when lying flat on one's back. Preliminary studies have shown benefit but effectiveness at home may need to be verified by a home sleep test. These devices are generally not covered by medical insurance.  Examples of devices that maintain sleeping on the back to prevent snoring and mild sleep apnea.    Belt type body positioner  http://[employer]/    Electronic reminder  http://nightshifttherapy.com/            Oral Appliance  What is oral appliance therapy?  An oral appliance device fits on your teeth at night like a retainer used after having braces. The device is made by a specialized dentist and requires several visits over 1-2 months before a manufactured device is made to fit your teeth and is adjusted to prevent your sleep apnea. Once an oral device is working properly, snoring should be improved. A home sleep test may be recommended at that time if to determine whether the sleep apnea is adequately treated.       Some things to remember:  -Oral devices are often, but not always, covered by your medical insurance. Be sure to check with your insurance provider.   -If you are referred for oral therapy, you will be given a list of specialized dentists to consider or you may choose to visit the Web site of the American Academy of Dental Sleep Medicine  -Oral devices are less likely to work if you have severe sleep apnea or are extremely overweight.     More detailed information  An oral appliance is a small acrylic device that fits over the  upper and lower teeth  (similar to a retainer or a mouth guard). This device slightly moves jaw forward, which moves the base of the tongue forward, opens the airway, improves breathing for effective treat snoring and obstructive sleep apnea in perhaps 7 out of 10 people .  The best working devices are custom-made by a dental device  after a mold is made of the teeth 1, 2, 3.  When is an oral appliance indicated?  Oral appliance therapy is recommended as a first-line treatment for patients with primary snoring, mild sleep apnea, and for patients with moderate sleep apnea who prefer appliance therapy to use of CPAP4, 5. Severity of sleep apnea is determined by sleep testing and is based on the number of respiratory events per hour of sleep.   How successful is oral appliance therapy?  The success rate of oral appliance therapy in patients with mild sleep apnea is 75-80% while in patients with moderate sleep apnea it is 50-70%. The chance of success in patients with severe sleep apnea is 40-50%. The research also shows that oral appliances have a beneficial effect on the cardiovascular health of VONNIE patients at the same magnitude as CPAP therapy7.  Oral appliances should be a second-line treatment in cases of severe sleep apnea, but if not completely successful then a combination therapy utilizing CPAP plus oral appliance therapy may be effective. Oral appliances tend to be effective in a broad range of patients although studies show that the patients who have the highest success are females, younger patients, those with milder disease, and less severe obesity. 3, 6.   Finding a dentist that practices dental sleep medicine  Specific training is available through the American Academy of Dental Sleep Medicine for dentists interested in working in the field of sleep. To find a dentist who is educated in the field of sleep and the use of oral appliances, near you, visit the Web site of the American Academy of  Dental Sleep Medicine.    References  1. Radha et al. Objectively measured vs self-reported compliance during oral appliance therapy for sleep-disordered breathing. Chest 2013; 144(5): 0008-4046.  2. Millie et al. Objective measurement of compliance during oral appliance therapy for sleep-disordered breathing. Thorax 2013; 68(1): 91-96.  3. Raven, et al. Mandibular advancement devices in 620 men and women with VONNIE and snoring: tolerability and predictors of treatment success. Chest 2004; 125: 5384-3121.  4. Kaya et al. Oral appliances for snoring and VONNIE: a review. Sleep 2006; 29: 244-262.  5. Ganesh et al. Oral appliance treatment for VONNIE: an update. J Clin Sleep Med 2014; 10(2): 215-227.  6. Talha et al. Predictors of OSAH treatment outcome. J Dent Res 2007; 86: 3979-2872.      Weight Loss: Your Body mass index is 30.9 kg/m .    Being overweight does not necessarily mean you will have health consequences.  Those who have BMI over 35 or over 27 with existing medical conditions carries greater risk.   Weight loss decreases severity of sleep apnea in most people with obesity. For those with mild obesity who have developed snoring with weight gain, even 15-30 pound weight loss can improve and occasionally milder eliminate sleep apnea.  Structured and life-long dietary and health habits are necessary to lose weight and keep healthier weight levels.     The Comprehensive Weight loss program offers all aspects of weight loss strategies including two Non-Surgical Weight Loss Programs: Medical Weight Management and our 24 Week Healthy Lifestyle Program:    Medical Weight Management: You will meet with a Medical Weight Management Provider, as well as a Registered Dietician. The program may include medication therapy, dietary education, recommended exercise and physical therapy programs, monthly support group meetings, and possible psychological counseling. Follow up visits with the provider or  dietician are scheduled based on your progress and needs.    24 Week Healthy Lifestyle Program: This unique program is designed to give you the support of weekly appointments and activities thru a 24-week period. It may include all of the components of the basic program (above), with the addition of 11 individual Health  Visits, 24-week access to the [employer] website for over 700 online classes, and monthly support group meetings. This program has an out-of-pocket expense of $499 to cover the items that can not be billed to insurance (health coaches and [employer] access), and is non-refundable/non-transferable (you may be able to use a Health Savings Account; ask your HSA provider). There may be an optional meal replacement plan prescribed as well.   Surgical management achieves meaningful long-term weight loss and improvement in health risks in most patients with more severe obesity.      Sleep Apnea Surgery:    Surgery for obstructive sleep apnea is considered generally only when other therapies fail to work. Surgery may be discussed with you if you are having a difficult time tolerating CPAP and or when there is an abnormal structure that requires surgical correction.  Nose and throat surgeries often enlarge the airway to prevent collapse.  Most of these surgeries create pain for 1-2 weeks and up to half of the most common surgeries are not effective throughout life.  You should carefully discuss the benefits and drawbacks to surgery with your sleep provider and surgeon to determine if it is the best solution for you.   More information  Surgery for VONNIE is directed at areas that are responsible for narrowing or complete obstruction of the airway during sleep.  There are a wide range of procedures available to enlarge and/or stabilize the airway to prevent blockage of breathing in the three major areas where it can occur: the palate, tongue, and nasal regions.  Successful surgical treatment depends on the  accurate identification of the factors responsible for obstructive sleep apnea in each person.  A personalized approach is required because there is no single treatment that works well for everyone.  Because of anatomic variation, consultation with an examination by a sleep surgeon is a critical first step in determining what surgical options are best for each patient.  In some cases, examination during sedation may be recommended in order to guide the selection of procedures.  Patients will be counseled about risks and benefits as well as the typical recovery course after surgery. Surgery is typically not a cure for a person s VONNIE.  However, surgery will often significantly improve one s VONNIE severity (termed  success rate ).  Even in the absence of a cure, surgery will decrease the cardiovascular risk associated with OSA7; improve overall quality of life8 (sleepiness, functionality, sleep quality, etc).    Palate Procedures:  Patients with VONNIE often have narrowing of their airway in the region of their tonsils and uvula.  The goals of palate procedures are to widen the airway in this region as well as to help the tissues resist collapse.  Modern palate procedure techniques focus on tissue conservation and soft tissue rearrangement, rather than tissue removal.  Often the uvula is preserved in this procedure. Residual sleep apnea is common in patient after pharyngoplasty with an average reduction in sleep apnea events of 33%2.      Tongue Procedures:  While patients are awake, the muscles that surround the throat are active and keep this region open for breathing. These muscles relax during sleep, allowing the tongue and other structures to collapse and block breathing.  There are several different tongue procedures available.  Selection of a tongue base procedure depends on characteristics seen on physical exam.  Generally, procedures are aimed at removing bulky tissues in this area or preventing the back of the  tongue from falling back during sleep.  Success rates for tongue surgery range from 50-62%3.    Hypoglossal Nerve Stimulation:  Hypoglossal nerve stimulation has recently received approval from the [place] Food and Drug Administration for the treatment of obstructive sleep apnea.  This is based on research showing that the system was safe and effective in treating sleep apnea6.  Results showed that the median AHI score decreased 68%, from 29.3 to 9.0. This therapy uses an implant system that senses breathing patterns and delivers mild stimulation to airway muscles, which keeps the airway open during sleep.  The system consists of three fully implanted components: a small generator (similar in size to a pacemaker), a breathing sensor, and a stimulation lead.  Using a small handheld remote, a patient turns the therapy on before bed and off upon awakening.    Candidates for this device must be greater than 18 years of age, have moderate to severe obstructive sleep apnea with less than 25% central events  (AHI between 15-65), BMI less than 35, have tried CPAP/oral appliance for at least 8 weeks without success, and have appropriate upper airway anatomy (determined by a sleep endoscopy performed by Dr. Ridge Maradiaga or Dr. Elier Palacios).    Nasal Procedures:  Nasal obstruction can interfere with nasal breathing during the day and night.  Studies have shown that relief of nasal obstruction can improve the ability of some patients to tolerate positive airway pressure therapy for obstructive sleep apnea1.  Treatment options include medications such as nasal saline, topical corticosteroid and antihistamine sprays, and oral medications such as antihistamines or decongestants. Non-surgical treatments can include external nasal dilators for selected patients. If these are not successful by themselves, surgery can improve the nasal airway either alone or in combination with these other options.        Combination  Procedures:  Combination of surgical procedures and other treatments may be recommended, particularly if patients have more than one area of narrowing or persistent positional disease.  The success rate of combination surgery ranges from 66-80%2,3.    References  Paresh CAMARA. The Role of the Nose in Snoring and Obstructive Sleep Apnoea: An Update.  Eur Arch Otorhinolaryngol. 2011; 268: 1365-73.   Anna Marie SM; Andree JA; Henny JR; Pallanch JF; Luis Enrique MB; Yolanda SG; Maikel GARCIA. Surgical modifications of the upper airway for obstructive sleep apnea in adults: a systematic review and meta-analysis. SLEEP 2010;33(10):7955-8438. Hal DEL ROSARIO. Hypopharyngeal surgery in obstructive sleep apnea: an evidence-based medicine review.  Arch Otolaryngol Head Neck Surg. 2006 Feb;132(2):206-13.  Woody YH1, Perfecto Y, Bang JOVITA. The efficacy of anatomically based multilevel surgery for obstructive sleep apnea. Otolaryngol Head Neck Surg. 2003 Oct;129(4):327-35.  Kezirian E, Goldberg A. Hypopharyngeal Surgery in Obstructive Sleep Apnea: An Evidence-Based Medicine Review. Arch Otolaryngol Head Neck Surg. 2006 Feb;132(2):206-13.  Rachana PJ et al. Upper-Airway Stimulation for Obstructive Sleep Apnea.  N Engl J Med. 2014 Jan 9;370(2):139-49.  Vivian Y et al. Increased Incidence of Cardiovascular Disease in Middle-aged Men with Obstructive Sleep Apnea. Am J Respir Crit Care Med; 2002 166: 159-165  Laguna EM et al. Studying Life Effects and Effectiveness of Palatopharyngoplasty (SLEEP) study: Subjective Outcomes of Isolated Uvulopalatopharyngoplasty. Otolaryngol Head Neck Surg. 2011; 144: 623-631.  WHAT IF I ONLY HAVE SNORING?  Mandibular advancement devices, lateral sleep positioning, long-term weight loss and treatment of nasal allergies have been shown to improve snoring.  Exercising tongue muscles with a game (https://apps.[employer]/us/rodriguez/soundly-reduce-snoring/wp198295) or stimulating the tongue during the day with a device  (https://doi.org/10.3390/yfd31098253) have improved snoring in some individuals.  https://www.[employer].com/  https://www.sleepfoundation.org/best-anti-snoring-mouthpieces-and-mouthguards    Remember to Drive Safe... Drive Alive     Sleep health profoundly affects your health, mood, and your safety.  Thirty three percent of the population (one in three of us) is not getting enough sleep and many have a sleep disorder. Not getting enough sleep or having an untreated / undertreated sleep condition may make us sleepy without even knowing it. In fact, our driving could be dramatically impaired due to our sleep health. As your provider, here are some things I would like you to know about driving:     Here are some warning signs for impairment and dangerous drowsy driving:              -Having been awake more than 16 hours               -Looking tired               -Eyelid drooping              -Head nodding (it could be too late at this point)              -Driving for more than 30 minutes     Some things you could do to make the driving safer if you are experiencing some drowsiness:              -Stop driving and rest              -Call for transportation              -Make sure your sleep disorder is adequately treated     Some things that have been shown NOT to work when experiencing drowsiness while driving:              -Turning on the radio              -Opening windows              -Eating any  distracting  /  entertaining  foods (e.g., sunflower seeds, candy, or any other)              -Talking on the phone      Your decision may not only impact your life, but also the life of others. Please, remember to drive safe for yourself and all of us.

## 2024-09-05 NOTE — NURSING NOTE
Current patient location:  [address] [place] [address]     Is the patient currently in the state of [place]? YES    Visit mode:VIDEO    If the visit is dropped, the patient can be reconnected by: VIDEO VISIT: Send to e-mail at: [email or web address]    Will anyone else be joining the visit? NO  (If patient encounters technical issues they should call [phone]    How would you like to obtain your AVS? MyChart    Are changes needed to the allergy or medication list? No    Are refills needed on medications prescribed by this physician? NO    Rooming Documentation:  Questionnaire(s) completed      Reason for visit: Consult    Denisa MCCLURE

## 2024-10-13 ENCOUNTER — HEALTH MAINTENANCE LETTER (OUTPATIENT)
Age: 44
End: 2024-10-13

## 2024-10-22 ENCOUNTER — VIRTUAL VISIT (OUTPATIENT)
Dept: PHARMACY | Facility: CLINIC | Age: 44
End: 2024-10-22
Payer: COMMERCIAL

## 2024-10-22 PROCEDURE — 99606 MTMS BY PHARM EST 15 MIN: CPT | Mod: 93 | Performed by: PHARMACIST

## 2024-10-22 NOTE — Clinical Note
IVY CORNELL note, thanks!  Natividad Solis, PharmD Medication Therapy Management Pharmacist [phone]

## 2024-10-22 NOTE — PATIENT INSTRUCTIONS
"Recommendations from today's MTM visit:                                                         We talked through weight loss options of phentermine, phentermine, phentermine/topiramate, and compounded semaglutide (through [facility], let me know if/when you are ready to get started on something.    Follow-up: Return On month after start weight loss medication, for Medication Therapy Management.    It was great speaking with you today.  I value your experience and would be very thankful for your time in providing feedback in our clinic survey. In the next few days, you may receive an email or text message from [employer] with a link to a survey related to your  clinical pharmacist.\"     To schedule another MTM appointment, please call the clinic directly or you may call the MTM scheduling line at [phone] or toll-free at [phone].     My Clinical Pharmacist's contact information:                                                      Please feel free to contact me with any questions or concerns you have.      Natividad Solis, PharmD  Medication Therapy Management Pharmacist   "

## 2024-10-22 NOTE — PROGRESS NOTES
Medication Therapy Management (MTM) Encounter    ASSESSMENT:                            Medication Adherence/Access: No issues identified.    Weight Management:   Patient with BMI over 30 and has been unable to lose weight despite lifestyle modification. Patient would be a candidate for pharmacotherapy. Discussed options of phentermine, topiramate, naltrexone +/- bupropion, and compounded semaglutide (not covered as [employer] prescription, [facility] employee plan).     Bupropion has improved mood, and would be appropriate to use for this, however since she doesn't have a diagnosis for this I recommended she be seen by provider to start this.    She plans to think about options discussed to day and send me a K[employer]ur message when she's ready to start something.    Will keep in mind that phentermine can increase HR and BP, history of increased nervousness/heart racing with over-the-counter Pseudofed products (stimulant concern).    PLAN:                            We talked through weight loss options of phentermine, phentermine, phentermine/topiramate, and compounded semaglutide (through [facility], let me know if/when you are ready to get started on something.    Follow-up: Return On month after start weight loss medication, for Medication Therapy Management.    SUBJECTIVE/OBJECTIVE:                          Patricia Levi is a 44 year old female seen for a follow-up visit.       Reason for visit: med review.    Allergies/ADRs: Reviewed in chart  Past Medical History: Reviewed in chart  Tobacco: She reports that she quit smoking about 18 years ago. Her smoking use included cigarettes. She started smoking about 23 years ago. She has been exposed to tobacco smoke. She has never used smokeless tobacco.  Alcohol: 4-5 drinks on the weekends  Other Substance Use: none    Medication Adherence/Access: no issues reported    Weight Management   She tried bupropion  mg daily and naltrexone 12.5 mg (1/4 tablet of a  "50 mg tablet) - after 4-5 days she got really dizzy, she felt much better after stopping these. She retrialed things at bupropion  mg daily for a week, that went well, and then she added in 6.25 mg (1/8 of a 50 mg tablet) and she had terrible dizziness, bloating.     She isn't on either bupropion or naltrexone right now. She did like the bupropion, it have her more energy and she didn't feel as worn out and stressed. Reports history of situational anxiety with her son's cancer treatment 10 years ago, but never diagnosed with mental health condition.   Interested in exploring other options for weight loss, and possibly going back on buspirone for mental health benefits.    She has tried various diet programs and exercise.     Nutrition/Eating Habits: as per previous visit  Exercise/Activity: as per previous visit    Medication History: None.   Starting weight: 188 lb       Wt Readings from Last 4 Encounters:   09/05/24 180 lb (81.6 kg)   05/16/24 188 lb 11.2 oz (85.6 kg)   12/18/23 186 lb 6.4 oz (84.6 kg)   07/12/22 175 lb 1.6 oz (79.4 kg)     Estimated body mass index is 30.9 kg/m  as calculated from the following:    Height as of 9/5/24: 5' 4\" (1.626 m).    Weight as of 9/5/24: 180 lb (81.6 kg).      Today's Vitals: There were no vitals taken for this visit.  ----------------      I spent 22 minutes with this patient today. All changes were made via collaborative practice agreement with Olya Yu NP. A copy of the visit note was provided to the patient's provider(s).    A summary of these recommendations was sent via [employer].    Natividad Solis, Edgar  Medication Therapy Management Pharmacist      Telemedicine Visit Details  The patient's medications can be safely assessed via a telemedicine encounter.  Type of service:  Telephone visit  Originating Location (pt. Location): Home    Distant Location (provider location):  On-site  Start Time:  1:07 PM  End Time: 1:29 PM     Medication Therapy " Recommendations  No medication therapy recommendations to display

## 2024-11-20 ASSESSMENT — SLEEP AND FATIGUE QUESTIONNAIRES
HOW LIKELY ARE YOU TO NOD OFF OR FALL ASLEEP WHILE WATCHING TV: SLIGHT CHANCE OF DOZING
HOW LIKELY ARE YOU TO NOD OFF OR FALL ASLEEP WHILE LYING DOWN TO REST IN THE AFTERNOON WHEN CIRCUMSTANCES PERMIT: MODERATE CHANCE OF DOZING
HOW LIKELY ARE YOU TO NOD OFF OR FALL ASLEEP WHILE SITTING AND TALKING TO SOMEONE: WOULD NEVER DOZE
HOW LIKELY ARE YOU TO NOD OFF OR FALL ASLEEP WHEN YOU ARE A PASSENGER IN A CAR FOR AN HOUR WITHOUT A BREAK: SLIGHT CHANCE OF DOZING
HOW LIKELY ARE YOU TO NOD OFF OR FALL ASLEEP WHILE SITTING INACTIVE IN A PUBLIC PLACE: WOULD NEVER DOZE
HOW LIKELY ARE YOU TO NOD OFF OR FALL ASLEEP WHILE SITTING QUIETLY AFTER LUNCH WITHOUT ALCOHOL: WOULD NEVER DOZE
HOW LIKELY ARE YOU TO NOD OFF OR FALL ASLEEP IN A CAR, WHILE STOPPED FOR A FEW MINUTES IN TRAFFIC: WOULD NEVER DOZE
HOW LIKELY ARE YOU TO NOD OFF OR FALL ASLEEP WHILE SITTING AND READING: SLIGHT CHANCE OF DOZING

## 2024-11-25 ENCOUNTER — OFFICE VISIT (OUTPATIENT)
Dept: SLEEP MEDICINE | Facility: CLINIC | Age: 44
End: 2024-11-25
Payer: COMMERCIAL

## 2024-11-25 DIAGNOSIS — R06.83 SNORING: ICD-10-CM

## 2024-11-25 DIAGNOSIS — R41.89 BRAIN FOG: ICD-10-CM

## 2024-11-25 DIAGNOSIS — G47.00 FREQUENT NOCTURNAL AWAKENING: ICD-10-CM

## 2024-11-25 DIAGNOSIS — E66.811 OBESITY (BMI 30.0-34.9): ICD-10-CM

## 2024-11-25 NOTE — PROGRESS NOTES
Pt is completing a home sleep test. Pt was instructed on how to put on the Noxturnal T3 device and associated equipment before going to bed and given the opportunity to practice putting it on before leaving the sleep center. Pt was reminded to bring the home sleep test kit back to the center tomorrow, at the scheduled time for download and reporting. Patient was instructed to complete study using the following treatment?  none  Neck circumference: 38 CM / 15 inches.  Device number: 13

## 2024-12-18 ASSESSMENT — SLEEP AND FATIGUE QUESTIONNAIRES
HOW LIKELY ARE YOU TO NOD OFF OR FALL ASLEEP IN A CAR, WHILE STOPPED FOR A FEW MINUTES IN TRAFFIC: WOULD NEVER DOZE
HOW LIKELY ARE YOU TO NOD OFF OR FALL ASLEEP WHILE SITTING QUIETLY AFTER LUNCH WITHOUT ALCOHOL: WOULD NEVER DOZE
HOW LIKELY ARE YOU TO NOD OFF OR FALL ASLEEP WHILE SITTING AND READING: SLIGHT CHANCE OF DOZING
HOW LIKELY ARE YOU TO NOD OFF OR FALL ASLEEP WHILE LYING DOWN TO REST IN THE AFTERNOON WHEN CIRCUMSTANCES PERMIT: SLIGHT CHANCE OF DOZING
HOW LIKELY ARE YOU TO NOD OFF OR FALL ASLEEP WHILE SITTING AND TALKING TO SOMEONE: WOULD NEVER DOZE
HOW LIKELY ARE YOU TO NOD OFF OR FALL ASLEEP WHILE SITTING INACTIVE IN A PUBLIC PLACE: WOULD NEVER DOZE
HOW LIKELY ARE YOU TO NOD OFF OR FALL ASLEEP WHEN YOU ARE A PASSENGER IN A CAR FOR AN HOUR WITHOUT A BREAK: WOULD NEVER DOZE
HOW LIKELY ARE YOU TO NOD OFF OR FALL ASLEEP WHILE WATCHING TV: SLIGHT CHANCE OF DOZING

## 2024-12-19 ENCOUNTER — VIRTUAL VISIT (OUTPATIENT)
Dept: SLEEP MEDICINE | Facility: CLINIC | Age: 44
End: 2024-12-19
Payer: COMMERCIAL

## 2024-12-19 VITALS — HEIGHT: 64 IN | WEIGHT: 185 LBS | BODY MASS INDEX: 31.58 KG/M2

## 2024-12-19 DIAGNOSIS — R06.83 SNORING: Primary | ICD-10-CM

## 2024-12-19 DIAGNOSIS — R09.81 NASAL CONGESTION: ICD-10-CM

## 2024-12-19 DIAGNOSIS — J34.89 NASAL OBSTRUCTION: ICD-10-CM

## 2024-12-19 ASSESSMENT — PAIN SCALES - GENERAL: PAINLEVEL_OUTOF10: NO PAIN (0)

## 2024-12-19 NOTE — NURSING NOTE
Current patient location:  [address] [place] [address]     Is the patient currently in the state of [place]? YES    Visit mode:VIDEO    If the visit is dropped, the patient can be reconnected by:VIDEO VISIT: Text to cell phone:   Telephone Information:   Mobile [phone]       Will anyone else be joining the visit? NO  (If patient encounters technical issues they should call [phone]    Are changes needed to the allergy or medication list? No    Are refills needed on medications prescribed by this physician? NO    Rooming Documentation:  Questionnaire(s) completed    Reason for visit: RECHECK    Denisa RODRIGUEZF

## 2024-12-19 NOTE — PATIENT INSTRUCTIONS
WHAT IF I ONLY HAVE SNORING?  Mandibular advancement devices, lateral sleep positioning devices (SlumberBUMP or Sleep Noodle), long-term weight loss and treatment of nasal allergies have been shown to improve snoring.  Exercising tongue muscles with a game (https://Care2Manage.[employer]/Sunnovations/rodriguez/Biomedix vascular solution-reduce-snoring/rx1614577942) or stimulating the tongue during the day with a device (https://doi.org/10.3390/hnl93798197) have improved snoring in some individuals.  https://www.[employer].Travel Appeal/  https://www.sleepfoundation.org/best-anti-snoring-mouthpieces-and-mouthguards

## 2024-12-19 NOTE — PROGRESS NOTES
Virtual Visit Details  Type of service: Video Visit   Start time: 11:51 AM  End time: 11:58 AM  Originating Location (pt. Location): Other at work  Distant Location (provider location): Off-site  Platform used for Video Visit: Credii    Sleep Study Follow-Up Visit:    Date on this visit: 2024    Patricia Levi comes in today for follow-up of her home sleep study done on 2024 for symptoms suggestive of obstructive sleep apnea.     Home Sleep Study Interpretation     Patient: Patricia Levi  MRN: [id number]  YOB: 1980  Study Date: 2024  PCP/Referring Provider: Olya Yu;  Ordering Provider: DOMO Solorzano     Indications for Home Study: Patricia is a 44 Female who presents with symptoms suggestive of obstructive sleep apnea        Weight: 180.0 lbs  BMI: 30.9   [place]:   STOP BAN/8        Data: A full night home sleep study was performed recording the standard physiologic parameters including body position, movement, sound, nasal pressure, thermal oral airflow, chest and abdominal movements with respiratory inductance plethysmography, and oxygen saturation by pulse oximetry. Pulse rate was estimated by oximetry recording. This study was considered adequate based on > 4 hours of quality oximetry and respiratory recording. As specified by the AASM Manual for the Scoring of Sleep and Associated events, version 2.3, Rule VIII.D 1B, 4% oxygen desaturation scoring for hypopneas is used as a standard of care on all home sleep apnea testing.     Analysis Time: 452.7 minutes     Respiration:  Sleep Associated Hypoxemia: Sustained hypoxemia was not present. Baseline oxygen saturation was 97. Time with saturation less than 89% was 0 minutes. Time with saturation less than 90% was 0 minutes. The lowest oxygen saturation was 89.0%.  Snoring: Snoring was present 33% of the time with an average level of 70 dB. Duration of time snoring above 70 dB was 113.1 minutes.     Respiratory  events: The home study revealed a presence of 1 obstructive apneas and 0 mixed and central apneas. There were 12 hypopneas resulting in a combined apnea/hypopnea index [AHI] of 2 events per hour with  3 per hour supine, 0  per hour prone, 0 per hour upright, 0  per hour left side, and 1 per hour right side.     Position: Percent of time spent: Supine 42%, prone 0%, upright 0%, on left 7%, on right 51%.     Heart Rate: By Pulse Oximetry normal rate was noted.     Assessment:  No evidence of obstructive sleep apnea.  Sleep associated hypoxemia was not present.     Recommendations:  Consider positional therapy  Suggest optimizing sleep hygiene and avoiding sleep deprivation  Weight management     Diagnosis Code(s): Snoring R06.83     Electronically signed by: Junaid Aguirre DO December 4, 2024  Diplomate, American Board of Internal Medicine, Sleep Medicine    These findings were reviewed with patient.     Past medical/surgical history, family history, social history, medications and allergies were reviewed.      Problem List:  Patient Active Problem List    Diagnosis Date Noted    IUD (intrauterine device) in place- Mirena 4/2019 10/13/2020     Priority: Medium     Remove within 7 years, by 4/2026        Impression/Plan:  (R06.83) Snoring (primary encounter diagnosis)  Comment: Patricia presents to the sleep clinic to review the results of her home sleep study done on 11/25/2024. The results of her sleep study were reviewed in depth. Informed Patricia her study was negative for significant apnea and sleep associated hypoxemia. Snoring was present.   Plan: Provided Patricia a list of suggestions for snoring including OTC mandibular advancement devices, lateral sleep positioning, weight management, and management of nasal allergies.     (R09.81) Nasal congestion (J34.89) Nasal obstruction  Comment: Patricia does feel like she deals with congestion and her right nasal passage feels like it is occluded or difficult to breathe through. I  will refer her to ENT for further exploration and management.   Plan: Adult ENT  Referral    She will follow up with me in the future if symptoms worsen or fail to improve.    Total time spent reviewing medical records, history and physical examination, review of previous testing and interpretation as well as documentation on this date: 16 minutes     ESTEE Morales CNP    CC: Olya Yu

## 2025-02-17 ENCOUNTER — MYC MEDICAL ADVICE (OUTPATIENT)
Dept: INTERNAL MEDICINE | Facility: CLINIC | Age: 45
End: 2025-02-17
Payer: COMMERCIAL

## 2025-02-17 NOTE — TELEPHONE ENCOUNTER
Patient confirmed scheduled appointment:  Date: 2/19/2025  Time: 930am  Visit type: Video Visit   Provider: Olya Yu NP  Location: Virtual Visit   Testing/imaging: -  Additional notes: Patient agreed to scheduled appointment

## 2025-02-19 ENCOUNTER — MYC MEDICAL ADVICE (OUTPATIENT)
Dept: INTERNAL MEDICINE | Facility: CLINIC | Age: 45
End: 2025-02-19

## 2025-02-19 ENCOUNTER — VIRTUAL VISIT (OUTPATIENT)
Dept: INTERNAL MEDICINE | Facility: CLINIC | Age: 45
End: 2025-02-19
Payer: COMMERCIAL

## 2025-02-19 DIAGNOSIS — Z12.31 ENCOUNTER FOR SCREENING MAMMOGRAM FOR BREAST CANCER: ICD-10-CM

## 2025-02-19 DIAGNOSIS — F39 MOOD DISORDER: Primary | ICD-10-CM

## 2025-02-19 RX ORDER — BUPROPION HYDROCHLORIDE 100 MG/1
TABLET, EXTENDED RELEASE ORAL
Qty: 162 TABLET | Refills: 2 | Status: SHIPPED | OUTPATIENT
Start: 2025-02-19

## 2025-02-19 ASSESSMENT — ANXIETY QUESTIONNAIRES
6. BECOMING EASILY ANNOYED OR IRRITABLE: SEVERAL DAYS
GAD7 TOTAL SCORE: 3
1. FEELING NERVOUS, ANXIOUS, OR ON EDGE: SEVERAL DAYS
3. WORRYING TOO MUCH ABOUT DIFFERENT THINGS: SEVERAL DAYS
5. BEING SO RESTLESS THAT IT IS HARD TO SIT STILL: NOT AT ALL
7. FEELING AFRAID AS IF SOMETHING AWFUL MIGHT HAPPEN: NOT AT ALL
4. TROUBLE RELAXING: NOT AT ALL
GAD7 TOTAL SCORE: 3
IF YOU CHECKED OFF ANY PROBLEMS ON THIS QUESTIONNAIRE, HOW DIFFICULT HAVE THESE PROBLEMS MADE IT FOR YOU TO DO YOUR WORK, TAKE CARE OF THINGS AT HOME, OR GET ALONG WITH OTHER PEOPLE: NOT DIFFICULT AT ALL
GAD7 TOTAL SCORE: 3
8. IF YOU CHECKED OFF ANY PROBLEMS, HOW DIFFICULT HAVE THESE MADE IT FOR YOU TO DO YOUR WORK, TAKE CARE OF THINGS AT HOME, OR GET ALONG WITH OTHER PEOPLE?: NOT DIFFICULT AT ALL
7. FEELING AFRAID AS IF SOMETHING AWFUL MIGHT HAPPEN: NOT AT ALL
2. NOT BEING ABLE TO STOP OR CONTROL WORRYING: NOT AT ALL

## 2025-02-19 NOTE — TELEPHONE ENCOUNTER
Left Voicemail (1st Attempt) and Sent Mychart (1st Attempt) for the patient to call back and schedule the following:    Appointment type: Physical   Provider: PCP  Return date: May   Specialty phone number: [phone]  Additonal Notes: per check out - Return in about 3 months (around 5/19/2025) for Routine preventive

## 2025-02-19 NOTE — PROGRESS NOTES
Patricia is a 44 year old who is being evaluated via a billable video visit.    How would you like to obtain your AVS? MyChart  If the video visit is dropped, the invitation should be resent by: Send to e-mail at: [email or web address]  Will anyone else be joining your video visit? No      Are refills needed on medications prescribed by this physician? YES    Ching Munroe VVF    Assessment & Plan       Mood disorder  BMI 30.0-30.9,adult  She notices benefit with bupropion for mood and cravings management. Will plan to continue at this time. Can increase dose up to 200 mg twice daily if needed.   - buPROPion (WELLBUTRIN SR) 100 MG 12 hr tablet  Dispense: 162 tablet; Refill: 2    Encounter for screening mammogram for breast cancer  Ordered.   - MA Screen Bilateral w/Conrad        Return in about 3 months (around 5/19/2025) for Routine preventive.    Subjective   Patricia is a 44 year old, presenting for the following health issues:  RECHECK and Medication questions    History of Present Illness       Mental Health Follow-up:  Patient presents to follow-up on Anxiety.    Patient's anxiety since last visit has been:  Good  The patient is not having other symptoms associated with anxiety.  Any significant life events: No  Patient is not feeling anxious or having panic attacks.  Patient has no concerns about alcohol or drug use.    Reason for visit:  Medication questions    She eats 2-3 servings of fruits and vegetables daily.She consumes 1 sweetened beverage(s) daily.She exercises with enough effort to increase her heart rate 10 to 19 minutes per day.  She exercises with enough effort to increase her heart rate 3 or less days per week.   She is taking medications regularly.     Patricia Levi presents to the clinic virtually today to discuss mood.     Weight loss - weight has been increasing. Eating less and eating better, but note weights keeps increasing. Considering starting a different weight loss medication but hesitant due to  "potential side effects. She did not tolerate naltrexone for weight loss. Made her dizzy. She did notice benefit with bupropion, helped with cravings, felt happier on this.     Mood - she is interested in staying on bupropion. Has been using 100 mg once daily so far with benefit. Feels less irritable.     IUD - spotting off/on. Has had since 2019. Discussed she can consider having this replaced sooner if spotting persists, otherwise can stay in place for full 7 years. She will consider scheduling with gynecology.           Objective    Vitals - Patient Reported  Weight (Patient Reported): 86.2 kg (190 lb)  Height (Patient Reported): 162.6 cm (5' 4\")  BMI (Based on Pt Reported Ht/Wt): 32.61  Pain Score: No Pain (0)      Vitals:  No vitals were obtained today due to virtual visit.    Physical Exam   GENERAL: alert and no distress  EYES: Eyes grossly normal to inspection.  No discharge or erythema, or obvious scleral/conjunctival abnormalities.  RESP: No audible wheeze, cough, or visible cyanosis.    SKIN: Visible skin clear. No significant rash, abnormal pigmentation or lesions.  NEURO: Cranial nerves grossly intact.  Mentation and speech appropriate for age.  PSYCH: Appropriate affect, tone, and pace of words          Video-Visit Details    Type of service:  Video Visit   Originating Location (pt. Location): Home    Distant Location (provider location):  On-site  Platform used for Video Visit: Colten  Signed Electronically by: Olya Yu NP    "

## 2025-02-19 NOTE — PATIENT INSTRUCTIONS
Thank you for visiting the Primary Care Center today at the [facility]! The following is some information about our clinic:     Primary Care Center Frequently-Asked Questions    (1) How do I schedule appointments at the [facility]?     Primary Care--to schedule or make changes to an existing appointment, please call our primary care line at [phone].    Labs--to schedule a lab appointment at the [facility] you can use Gipis or call [phone]. If you have a [place] location that is closer to home, you can reach out to that location for scheduling options.     Imaging--if you need to schedule a CT, X-ray, MRI, ultrasound, or other imaging study you can call [phone] to schedule at the [facility] or any other [facility] imaging location.     Referrals--if a referral to another specialty was ordered you can expect a phone call from their scheduling team. If you have not heard from them in a week, please call us or send us a Gipis message to check the status or get a scheduling number. Please note that this only applies to internal [facility] referrals. If the referral is external you would need to contact their office for scheduling.     (2) I have a question about my visit, who do I contact?     You can call us at the primary care line at [phone] to ask questions about your visit. You can also send a secure message through Gipis, which is reviewed by clinic staff. Please note that Gipis messages have a twenty-four to forty-eight business hour turnaround time and should not be used for urgent concerns.    (3) How will I get the results of my tests?    If you are signed up for [employer] all tests will be released to you within twenty-four hours of resulting. Please allow three to five days for your doctor to review your results and place a note interpreting the results. If you do not have [employer] you will receive your  results through mail seven to ten business days following the return of the tests. Please note that if there should be any urgent or concerning results that your doctor or their registered nurse will reach out to you the same day as the tests come back. If you have follow up questions about your results or would like to discuss the results in detail please schedule a follow up with your provider either in person or virtually.     (4) How do I get refills of my prescriptions?     You should always first contact your pharmacy for refills of your medications. If submitting a refill request on [employer], please be sure to submit the request only once--repeat requests can cause delays in refill. If you are requesting a NEW medication or a medication related to new symptoms you will need to schedule an appointment with a provider prior to approval. Please note: Routine medication refills have up to one to three business day turnaround whereas controlled substances refills have up to five to seven business day turnaround.    (5) I have new symptoms, what do I do?     If you are having an immediate medical emergency, you should dial 911 for assistance.   For anything urgent that needs to be seen within a few hours to one day you should visit a local urgent care for assistance.  For non-urgent symptoms that need to be seen within a few days to a week you can schedule with an available provider in primary care by going to "[employer] or calling [phone].   If you are not sure how serious your symptoms are or you would like to receive medical advice you can always call [phone] to speak with a triage nurse.

## 2025-03-10 ENCOUNTER — OFFICE VISIT (OUTPATIENT)
Dept: OTOLARYNGOLOGY | Facility: CLINIC | Age: 45
End: 2025-03-10
Attending: OTOLARYNGOLOGY
Payer: COMMERCIAL

## 2025-03-10 VITALS
HEART RATE: 77 BPM | DIASTOLIC BLOOD PRESSURE: 84 MMHG | SYSTOLIC BLOOD PRESSURE: 137 MMHG | HEIGHT: 64 IN | WEIGHT: 193.5 LBS | OXYGEN SATURATION: 98 % | BODY MASS INDEX: 33.03 KG/M2

## 2025-03-10 DIAGNOSIS — J34.89 NASAL OBSTRUCTION: ICD-10-CM

## 2025-03-10 DIAGNOSIS — R09.81 NASAL CONGESTION: ICD-10-CM

## 2025-03-10 ASSESSMENT — PAIN SCALES - GENERAL: PAINLEVEL_OUTOF10: NO PAIN (0)

## 2025-03-10 NOTE — PROGRESS NOTES
[facility] [address] [address]  Phone: [phone]  Fax: [phone]    Patient:  Patricia Levi, Date of birth 1980  Date of Visit:  03/10/2025  Referring Provider Jason Stover      Assessment & Plan      Nasal congestion/obstruction:  - Nasal congestion possibly related to nasal dryness and structural issues.  - Try saline solution, Afrin nose spray, and Breathe Right strips independently to determine which provides the most relief. Contact the clinic for further evaluation if needed.  - Risks and side effects: Afrin is not a long-term solution.    - Nasal obstruction due to a bowed septum and poorly supported left upper lateral nasal cartilage.  - Consider nasal valve reconstruction and septoplasty if conservative measures do not provide sufficient relief. Follow up with facial plastic surgeons if necessary.    20 minutes spent by me on the date of the encounter doing chart review, history and exam, documentation and further activities per the note       History of Present Illness     Pertinent history obtain from: chart review and patient    Patricia Levi, a 44-year-old female, reports concerns about potential sleep apnea, experiencing episodes of waking up gasping for air approximately twice a year. She has a history of worsening snoring, particularly noted after the birth of her youngest child. She has long-standing sinus issues, with sinus pressure and infections occurring primarily when she has a cold, now requiring antibiotics about once a year. Her headaches are associated with sinus problems. She experiences seasonal allergies in both spring and fall, characterized by sneezing, itching, and watery eyes, for which she uses Flonase and Zyrtec. She denies any history of asthma, ear, or throat problems. She has not had any nasal surgeries or broken her nose.    Past Medical History:   Diagnosis Date    Adjustment  "disorder     situational during sons illness leukemia     Asthma     Migraine headache     Seasonal allergies      Current Outpatient Medications   Medication Instructions    Aspirin-Acetaminophen-Caffeine (EXCEDRIN PO) 1 tablet, Oral, DAILY, Or will take ibuprofen.     buPROPion (WELLBUTRIN SR) 100 MG 12 hr tablet Take 1 tablet daily for 7 days, then increase to 1 tablet twice daily for 7 days, then to 2 tablet in the morning + 1 tablet every evening for 7 days, and then 2 tabs twice daily thereafter.    cetirizine (ZYRTEC) 10 mg, Oral, 2 TIMES DAILY    fluticasone (FLONASE) 50 MCG/ACT nasal spray 1 spray, Both Nostrils, DAILY    olopatadine (PATANOL) 0.1 % ophthalmic solution 1 drop, Both Eyes, 2 TIMES DAILY    tretinoin (RETIN-A) 0.05 % external cream Topical, AT BEDTIME, As needed        Physical Exam     Vital signs:  /84 (BP Location: Right arm, Patient Position: Sitting, Cuff Size: Adult Large)   Pulse 77   Ht 1.626 m (5' 4\")   Wt 87.8 kg (193 lb 8 oz)   SpO2 98%   BMI 33.21 kg/m      - HEENT: Nasal base asymmetry observed. Leftward septal deviation with a posterior septal spur, creating a reverse C-shaped septal deformity. Mid nasal dorsum widening noted. Insufficient support of nasal cartilage, particularly the depressed upper lateral cartilage. On left side.    No pertinent imaging           Consent was obtained from the patient to use an AI documentation tool in the creation of  this note  "

## 2025-03-10 NOTE — LETTER
3/10/2025       RE: Patricia Levi  [address] [place] [address]     Dear Colleague,    Thank you for referring your patient, Patricia Levi, to the [facility] [place] at . Please see a copy of my visit note below.      [facility] [address]  4TH FLOOR  [facility] [address]  Phone: [phone]  Fax: [phone]    Patient:  Patricia Levi, Date of birth 1980  Date of Visit:  03/10/2025  Referring Provider Jason Stover      Assessment & Plan     Nasal congestion/obstruction:  - Nasal congestion possibly related to nasal dryness and structural issues.  - Try saline solution, Afrin nose spray, and Breathe Right strips independently to determine which provides the most relief. Contact the clinic for further evaluation if needed.  - Risks and side effects: Afrin is not a long-term solution.    - Nasal obstruction due to a bowed septum and poorly supported left upper lateral nasal cartilage.  - Consider nasal valve reconstruction and septoplasty if conservative measures do not provide sufficient relief. Follow up with facial plastic surgeons if necessary.    20 minutes spent by me on the date of the encounter doing chart review, history and exam, documentation and further activities per the note       History of Present Illness    Pertinent history obtain from: chart review and patient    Patricia Levi, a 44-year-old female, reports concerns about potential sleep apnea, experiencing episodes of waking up gasping for air approximately twice a year. She has a history of worsening snoring, particularly noted after the birth of her youngest child. She has long-standing sinus issues, with sinus pressure and infections occurring primarily when she has a cold, now requiring antibiotics about once a year. Her headaches are associated with sinus problems. She  "experiences seasonal allergies in both spring and fall, characterized by sneezing, itching, and watery eyes, for which she uses Flonase and Zyrtec. She denies any history of asthma, ear, or throat problems. She has not had any nasal surgeries or broken her nose.    Past Medical History:   Diagnosis Date     Adjustment disorder     situational during sons illness leukemia      Asthma      Migraine headache      Seasonal allergies      Current Outpatient Medications   Medication Instructions     Aspirin-Acetaminophen-Caffeine (EXCEDRIN PO) 1 tablet, Oral, DAILY, Or will take ibuprofen.      buPROPion (WELLBUTRIN SR) 100 MG 12 hr tablet Take 1 tablet daily for 7 days, then increase to 1 tablet twice daily for 7 days, then to 2 tablet in the morning + 1 tablet every evening for 7 days, and then 2 tabs twice daily thereafter.     cetirizine (ZYRTEC) 10 mg, Oral, 2 TIMES DAILY     fluticasone (FLONASE) 50 MCG/ACT nasal spray 1 spray, Both Nostrils, DAILY     olopatadine (PATANOL) 0.1 % ophthalmic solution 1 drop, Both Eyes, 2 TIMES DAILY     tretinoin (RETIN-A) 0.05 % external cream Topical, AT BEDTIME, As needed        Physical Exam    Vital signs:  /84 (BP Location: Right arm, Patient Position: Sitting, Cuff Size: Adult Large)   Pulse 77   Ht 1.626 m (5' 4\")   Wt 87.8 kg (193 lb 8 oz)   SpO2 98%   BMI 33.21 kg/m      - HEENT: Nasal base asymmetry observed. Leftward septal deviation with a posterior septal spur, creating a reverse C-shaped septal deformity. Mid nasal dorsum widening noted. Insufficient support of nasal cartilage, particularly the depressed upper lateral cartilage. On left side.    No pertinent imaging           Consent was obtained from the patient to use an AI documentation tool in the creation of  this note      Again, thank you for allowing me to participate in the care of your patient.      Sincerely,    Radha Deluca MD    "

## 2025-03-10 NOTE — NURSING NOTE
"Chief Complaint   Patient presents with    Consult   Blood pressure 137/84, pulse 77, height 1.626 m (5' 4\"), weight 87.8 kg (193 lb 8 oz), SpO2 98%, not currently breastfeeding. Hugo Espinoza, EMT    "

## 2025-03-10 NOTE — PATIENT INSTRUCTIONS
You were seen in the ENT Clinic today by Dr. Deluca. If you have any questions or concerns after your appointment, please contact us (see below)       2.   The following recommendations have been made based upon your appointment today:   -Saline spray.   -Breathe right strips   -Afrin    3.   Please reach out should you decide you would like to follow up with our facial plastics team Dr. Alexy Forrest or Dr. Mejia.           How to Contact Us:  Send a [employer] message to your provider. Our team will respond to you via [employer]. Occasionally, we will need to call you to get further information.  For urgent matters (Monday-Friday), call the ENT Clinic: [phone] and speak with a call center team member - they will route your call appropriately.   If you'd like to speak directly with a nurse, please find our contact information below. We do our best to check voicemail frequently throughout the day, and will work to call you back within 1-2 days. For urgent matters, please use the general clinic phone numbers listed above.     Carmenza JAMISON RN  Direct: [phone]  Yelena RAYO LPN  Direct: [phone]         [facility]  Department of Otolaryngology

## 2025-04-02 NOTE — PROGRESS NOTES
Medication Therapy Management (MTM) Encounter    ASSESSMENT:                            Medication Adherence/Access: See below for considerations.    Weight Management and mood  Patient would benefit from changing bupropion SR to XL for convenience in dosing frequency .   She would benefit from additional pharmacotherapy as she has not been able to lose weight on bupropion only so far. Patient is concerned about side effects from stimulant due to history of poor tolerability to pseudoephedrine. Therefore discussed option of Qysmia vs GLP/GIP. She would prefer to trial GLP/GIP agent but cost may be a barrier. She would be interested in compounded product depending on cost.     PLAN:                            Change bupropion SR 100mg twice daily to  mg daily.     No changes made yet, patient to consider the following option and request she let me know which she prefers:  Compounded oral semaglutide --> sent patient mychart as I had to obtain more info from [facility], semaglutide sublingual solution estimated cost is ~$200/month  OR, Qsymia but would need PA for coverage    Follow-up: Return in about 1 month (around 5/3/2025) for Medication Therapy Management Visit after starting medication.    SUBJECTIVE/OBJECTIVE:                          Patricia Levi is a 44 year old female seen for a follow-up visit.       Reason for visit: weight management.    Allergies/ADRs: Reviewed in chart  Past Medical History: Reviewed in chart  Tobacco: She reports that she quit smoking about 19 years ago. Her smoking use included cigarettes. She started smoking about 24 years ago. She has been exposed to tobacco smoke. She has never used smokeless tobacco.  Alcohol: 4-6 beverages / week    Medication Adherence/Access: no issues reported.    Weight Management and mood  Bupropion  mg twice daily - 9AM and 9P    She finds bupropion to improve her energy and mood.   Side effects: Past few night waking up more  "frequently.   She has tried various diet programs and exercise. With keto diet and Metafast meal replacement in the past to be the most effective.   Nutrition/Eating Habits: as per previous visit meals inconsistent due to work schedule.   Exercise/Activity: Exercise 3 times a week, walking and jogging. She would like to work on more strength training.     Previously tried medications: combination of separate Rx of bupropion and naltrexone. Tolerated bupropion on its onw but had side effects of dizziness and bloating with naltrexone.    Initial weight at consult: 188 lbs.     Cumulative Weight Gain: -5-6 lb  Wt Readings from Last 4 Encounters:   03/10/25 193 lb 8 oz (87.8 kg)   12/19/24 185 lb (83.9 kg)   09/05/24 180 lb (81.6 kg)   05/16/24 188 lb 11.2 oz (85.6 kg)     Estimated body mass index is 33.21 kg/m  as calculated from the following:    Height as of 3/10/25: 5' 4\" (1.626 m).    Weight as of 3/10/25: 193 lb 8 oz (87.8 kg).    Today's Vitals: There were no vitals taken for this visit.  ----------------    I spent 24 minutes with this patient today. All changes were made via collaborative practice agreement with Olya Yu NP.     A summary of these recommendations was sent via [employer].    Hermelinda Mchugh, PharmD  Medication Therapy Management Pharmacist    Telemedicine Visit Details  The patient's medications can be safely assessed via a telemedicine encounter.  Type of service:  Video Conference via [employer]  Originating Location (pt. Location): Home    Distant Location (provider location):  Off-site  Start Time:  12:41P  End Time:  1:05P     Medication Therapy Recommendations  Mood disorder   1 Current Medication: buPROPion (WELLBUTRIN SR) 100 MG 12 hr tablet (Discontinued)   Current Medication Sig: Take 1 tablet daily for 7 days, then increase to 1 tablet twice daily for 7 days, then to 2 tablet in the morning + 1 tablet every evening for 7 days, and then 2 tabs twice daily thereafter.   Rationale: " Undesirable effect - Adverse medication event - Safety   Recommendation: Change Medication Formulation    Status: Accepted per CPA   Identified Date: 4/3/2025 Completed Date: 4/3/2025

## 2025-04-03 ENCOUNTER — VIRTUAL VISIT (OUTPATIENT)
Dept: PHARMACY | Facility: CLINIC | Age: 45
End: 2025-04-03
Payer: COMMERCIAL

## 2025-04-03 DIAGNOSIS — F39 MOOD DISORDER: ICD-10-CM

## 2025-04-03 RX ORDER — BUPROPION HYDROCHLORIDE 150 MG/1
150 TABLET ORAL EVERY MORNING
Qty: 90 TABLET | Refills: 0 | Status: SHIPPED | OUTPATIENT
Start: 2025-04-03

## 2025-04-04 NOTE — PATIENT INSTRUCTIONS
"Recommendation(s) from today's visit:                                                      Change bupropion SR 100mg twice daily to  mg daily.  You can use up the SR tablets, take the 2nd tablet earlier in the day ~dinner time.     No changes made yet, let me know what you prefer:  Compounded oral semaglutide from [facility], semaglutide sublingual solution estimated cost is ~$200/month  - This will be slightly different than the oral tablet that is commercially available, but similar in terms of what we discussed with timing of how to take it. I will give you more thorough education if you do decide this route.  OR, Qsymia (phentermine and topiramate combo tablet) but would need PA for coverage  - There are a few different doses of the medication. Doses come in 3.75/23 mg, 7.5/46 mg, 11.25/69 mg, and 15/92 mg. We would start with the lowest dose.  - Topiramate side effects to monitor for:  Tingling in hands,feet, or face   Mental confusion/brain fog   Feeling sleepy   - Phentermine side effects to monitor for    Feelings of racing pulse or rapid heart beat  Increased anxiety/jitteriness  Insomnia  Dry mouth  constipation   - Some people can get an elevated blood pressure. Please have your blood pressure rechecked 1-2 weeks after starting Qsymia and report results back to the clinic via [employer].      Follow-up: Return in about 1 month (around 5/3/2025) for Medication Therapy Management Visit after starting medication.    My Clinical Pharmacist's contact information:                                                      Hermelinda Mchugh, PharmD  Medication therapy management clinical pharmacist    It was great speaking with you today.  I value your experience and would be very thankful for your time in providing feedback in our clinic survey. In the next few days, you may receive an email or text message from [employer] with a link to a survey related to your  clinical pharmacist.\"     To schedule " another MTM appointment, please call the clinic directly [phone] or you may call the MTM scheduling line at [phone].

## 2025-08-08 ENCOUNTER — ANCILLARY PROCEDURE (OUTPATIENT)
Dept: MAMMOGRAPHY | Facility: CLINIC | Age: 45
End: 2025-08-08
Payer: COMMERCIAL

## 2025-08-08 DIAGNOSIS — Z12.31 ENCOUNTER FOR SCREENING MAMMOGRAM FOR BREAST CANCER: ICD-10-CM

## 2025-08-08 PROCEDURE — 77067 SCR MAMMO BI INCL CAD: CPT | Mod: TC | Performed by: RADIOLOGY

## 2025-08-08 PROCEDURE — 77063 BREAST TOMOSYNTHESIS BI: CPT | Mod: TC | Performed by: RADIOLOGY
